# Patient Record
Sex: FEMALE | Race: WHITE | Employment: FULL TIME | URBAN - METROPOLITAN AREA
[De-identification: names, ages, dates, MRNs, and addresses within clinical notes are randomized per-mention and may not be internally consistent; named-entity substitution may affect disease eponyms.]

---

## 2017-02-08 PROBLEM — D21.6 BENIGN NEOPLASM OF SOFT TISSUE OF BACK: Status: ACTIVE | Noted: 2017-02-08

## 2017-03-10 PROBLEM — E78.5 HYPERLIPEMIA: Status: ACTIVE | Noted: 2017-03-10

## 2017-03-10 PROBLEM — I10 HTN (HYPERTENSION), BENIGN: Status: ACTIVE | Noted: 2017-03-10

## 2017-03-10 PROBLEM — E87.6 HYPOKALEMIA: Status: ACTIVE | Noted: 2017-03-10

## 2017-03-10 PROBLEM — R07.9 CHEST PAIN: Status: ACTIVE | Noted: 2017-03-10

## 2018-03-05 PROBLEM — M54.9 BACK PAIN: Status: ACTIVE | Noted: 2018-03-05

## 2018-03-05 PROBLEM — R07.9 CHEST PAIN: Status: RESOLVED | Noted: 2017-03-10 | Resolved: 2018-03-05

## 2018-03-05 PROBLEM — D21.6 BENIGN NEOPLASM OF SOFT TISSUE OF BACK: Status: RESOLVED | Noted: 2017-02-08 | Resolved: 2018-03-05

## 2018-05-28 ENCOUNTER — HOSPITAL ENCOUNTER (OUTPATIENT)
Age: 58
Discharge: HOME | End: 2018-05-28
Payer: COMMERCIAL

## 2018-05-28 DIAGNOSIS — E78.2 MIXED HYPERLIPIDEMIA: ICD-10-CM

## 2018-05-28 DIAGNOSIS — I10 ESSENTIAL HYPERTENSION: ICD-10-CM

## 2018-05-28 DIAGNOSIS — E87.6 HYPOKALEMIA: ICD-10-CM

## 2018-05-28 DIAGNOSIS — M54.42 LOW BACK PAIN WITH BILATERAL SCIATICA, UNSPECIFIED BACK PAIN LATERALITY, UNSPECIFIED CHRONICITY: ICD-10-CM

## 2018-05-28 DIAGNOSIS — M54.41 LOW BACK PAIN WITH BILATERAL SCIATICA, UNSPECIFIED BACK PAIN LATERALITY, UNSPECIFIED CHRONICITY: ICD-10-CM

## 2018-05-28 LAB
ALBUMIN SERPL-MCNC: 4.1 G/DL (ref 3.5–5.2)
ALP BLD-CCNC: 64 U/L (ref 35–104)
ALT SERPL-CCNC: 22 U/L (ref 0–32)
ANION GAP SERPL CALCULATED.3IONS-SCNC: 12 MMOL/L (ref 7–16)
AST SERPL-CCNC: 29 U/L (ref 0–31)
BILIRUB SERPL-MCNC: 1.3 MG/DL (ref 0–1.2)
BUN BLDV-MCNC: 15 MG/DL (ref 6–20)
CALCIUM SERPL-MCNC: 9.5 MG/DL (ref 8.6–10.2)
CHLORIDE BLD-SCNC: 101 MMOL/L (ref 98–107)
CHOLESTEROL, TOTAL: 159 MG/DL (ref 0–199)
CO2: 28 MMOL/L (ref 22–29)
CREAT SERPL-MCNC: 0.7 MG/DL (ref 0.5–1)
GFR AFRICAN AMERICAN: >60
GFR NON-AFRICAN AMERICAN: >60 ML/MIN/1.73
GLUCOSE BLD-MCNC: 102 MG/DL (ref 74–109)
HDLC SERPL-MCNC: 66 MG/DL
LDL CHOLESTEROL CALCULATED: 78 MG/DL (ref 0–99)
POTASSIUM SERPL-SCNC: 3.8 MMOL/L (ref 3.5–5)
SODIUM BLD-SCNC: 141 MMOL/L (ref 132–146)
TOTAL PROTEIN: 7.2 G/DL (ref 6.4–8.3)
TRIGL SERPL-MCNC: 73 MG/DL (ref 0–149)
VLDLC SERPL CALC-MCNC: 15 MG/DL

## 2018-05-28 PROCEDURE — 80061 LIPID PANEL: CPT

## 2018-05-28 PROCEDURE — 36415 COLL VENOUS BLD VENIPUNCTURE: CPT

## 2018-05-28 PROCEDURE — 80053 COMPREHEN METABOLIC PANEL: CPT

## 2018-06-01 RX ORDER — TRIAMTERENE AND HYDROCHLOROTHIAZIDE 37.5; 25 MG/1; MG/1
1 TABLET ORAL DAILY
Qty: 90 TABLET | Refills: 0 | Status: SHIPPED | OUTPATIENT
Start: 2018-06-01 | End: 2018-08-13 | Stop reason: SDUPTHER

## 2018-06-05 ENCOUNTER — OFFICE VISIT (OUTPATIENT)
Dept: PRIMARY CARE CLINIC | Age: 58
End: 2018-06-05
Payer: COMMERCIAL

## 2018-06-05 VITALS
SYSTOLIC BLOOD PRESSURE: 130 MMHG | WEIGHT: 219 LBS | TEMPERATURE: 97 F | HEART RATE: 76 BPM | BODY MASS INDEX: 33.79 KG/M2 | DIASTOLIC BLOOD PRESSURE: 80 MMHG

## 2018-06-05 DIAGNOSIS — E78.2 MIXED HYPERLIPIDEMIA: ICD-10-CM

## 2018-06-05 DIAGNOSIS — I10 HTN (HYPERTENSION), BENIGN: Primary | ICD-10-CM

## 2018-06-05 DIAGNOSIS — E87.6 HYPOKALEMIA: ICD-10-CM

## 2018-06-05 DIAGNOSIS — M54.41 LOW BACK PAIN WITH BILATERAL SCIATICA, UNSPECIFIED BACK PAIN LATERALITY, UNSPECIFIED CHRONICITY: ICD-10-CM

## 2018-06-05 DIAGNOSIS — G47.00 INSOMNIA, UNSPECIFIED TYPE: ICD-10-CM

## 2018-06-05 DIAGNOSIS — M54.42 LOW BACK PAIN WITH BILATERAL SCIATICA, UNSPECIFIED BACK PAIN LATERALITY, UNSPECIFIED CHRONICITY: ICD-10-CM

## 2018-06-05 PROCEDURE — 99214 OFFICE O/P EST MOD 30 MIN: CPT | Performed by: FAMILY MEDICINE

## 2018-06-05 RX ORDER — METHYLPREDNISOLONE 4 MG/1
TABLET ORAL
Qty: 1 KIT | Refills: 0 | Status: SHIPPED | OUTPATIENT
Start: 2018-06-05 | End: 2018-06-11

## 2018-06-05 ASSESSMENT — ENCOUNTER SYMPTOMS
BACK PAIN: 1
NAUSEA: 0
HEMOPTYSIS: 0
BLURRED VISION: 0
SHORTNESS OF BREATH: 0
BLOOD IN STOOL: 0
ORTHOPNEA: 0
EYE DISCHARGE: 0
ABDOMINAL PAIN: 0

## 2018-06-12 ENCOUNTER — OFFICE VISIT (OUTPATIENT)
Dept: PRIMARY CARE CLINIC | Age: 58
End: 2018-06-12
Payer: COMMERCIAL

## 2018-06-12 VITALS
BODY MASS INDEX: 33.49 KG/M2 | DIASTOLIC BLOOD PRESSURE: 70 MMHG | HEART RATE: 76 BPM | TEMPERATURE: 96.6 F | SYSTOLIC BLOOD PRESSURE: 110 MMHG | WEIGHT: 217 LBS

## 2018-06-12 DIAGNOSIS — E78.2 MIXED HYPERLIPIDEMIA: ICD-10-CM

## 2018-06-12 DIAGNOSIS — I10 HTN (HYPERTENSION), BENIGN: ICD-10-CM

## 2018-06-12 DIAGNOSIS — M54.41 LOW BACK PAIN WITH BILATERAL SCIATICA, UNSPECIFIED BACK PAIN LATERALITY, UNSPECIFIED CHRONICITY: Primary | ICD-10-CM

## 2018-06-12 DIAGNOSIS — M54.42 LOW BACK PAIN WITH BILATERAL SCIATICA, UNSPECIFIED BACK PAIN LATERALITY, UNSPECIFIED CHRONICITY: Primary | ICD-10-CM

## 2018-06-12 DIAGNOSIS — E87.6 HYPOKALEMIA: ICD-10-CM

## 2018-06-12 PROCEDURE — 99213 OFFICE O/P EST LOW 20 MIN: CPT | Performed by: FAMILY MEDICINE

## 2018-06-12 ASSESSMENT — ENCOUNTER SYMPTOMS: BACK PAIN: 1

## 2018-08-04 ENCOUNTER — HOSPITAL ENCOUNTER (OUTPATIENT)
Age: 58
Discharge: HOME | End: 2018-08-06
Payer: COMMERCIAL

## 2018-08-04 DIAGNOSIS — E87.6 HYPOKALEMIA: ICD-10-CM

## 2018-08-04 DIAGNOSIS — E78.2 MIXED HYPERLIPIDEMIA: ICD-10-CM

## 2018-08-04 DIAGNOSIS — I10 HTN (HYPERTENSION), BENIGN: ICD-10-CM

## 2018-08-04 LAB
ALBUMIN SERPL-MCNC: 4.1 G/DL (ref 3.5–5.2)
ALP BLD-CCNC: 53 U/L (ref 35–104)
ALT SERPL-CCNC: 19 U/L (ref 0–32)
ANION GAP SERPL CALCULATED.3IONS-SCNC: 9 MMOL/L (ref 7–16)
AST SERPL-CCNC: 24 U/L (ref 0–31)
BILIRUB SERPL-MCNC: 1.4 MG/DL (ref 0–1.2)
BUN BLDV-MCNC: 17 MG/DL (ref 6–20)
CALCIUM SERPL-MCNC: 9.2 MG/DL (ref 8.6–10.2)
CHLORIDE BLD-SCNC: 105 MMOL/L (ref 98–107)
CHOLESTEROL, TOTAL: 148 MG/DL (ref 0–199)
CO2: 26 MMOL/L (ref 22–29)
CREAT SERPL-MCNC: 0.7 MG/DL (ref 0.5–1)
GFR AFRICAN AMERICAN: >60
GFR NON-AFRICAN AMERICAN: >60 ML/MIN/1.73
GLUCOSE BLD-MCNC: 85 MG/DL (ref 74–109)
HDLC SERPL-MCNC: 50 MG/DL
LDL CHOLESTEROL CALCULATED: 81 MG/DL (ref 0–99)
POTASSIUM SERPL-SCNC: 3.6 MMOL/L (ref 3.5–5)
SODIUM BLD-SCNC: 140 MMOL/L (ref 132–146)
TOTAL PROTEIN: 6.7 G/DL (ref 6.4–8.3)
TRIGL SERPL-MCNC: 83 MG/DL (ref 0–149)
VLDLC SERPL CALC-MCNC: 17 MG/DL

## 2018-08-04 PROCEDURE — 85027 COMPLETE CBC AUTOMATED: CPT

## 2018-08-04 PROCEDURE — 36415 COLL VENOUS BLD VENIPUNCTURE: CPT

## 2018-08-04 PROCEDURE — 80053 COMPREHEN METABOLIC PANEL: CPT

## 2018-08-04 PROCEDURE — 80061 LIPID PANEL: CPT

## 2018-08-13 ENCOUNTER — OFFICE VISIT (OUTPATIENT)
Dept: PRIMARY CARE CLINIC | Age: 58
End: 2018-08-13
Payer: COMMERCIAL

## 2018-08-13 DIAGNOSIS — E87.6 HYPOKALEMIA: ICD-10-CM

## 2018-08-13 DIAGNOSIS — I10 HTN (HYPERTENSION), BENIGN: ICD-10-CM

## 2018-08-13 DIAGNOSIS — E78.2 MIXED HYPERLIPIDEMIA: Primary | ICD-10-CM

## 2018-08-13 DIAGNOSIS — M54.42 LOW BACK PAIN WITH BILATERAL SCIATICA, UNSPECIFIED BACK PAIN LATERALITY, UNSPECIFIED CHRONICITY: ICD-10-CM

## 2018-08-13 DIAGNOSIS — M54.41 LOW BACK PAIN WITH BILATERAL SCIATICA, UNSPECIFIED BACK PAIN LATERALITY, UNSPECIFIED CHRONICITY: ICD-10-CM

## 2018-08-13 PROCEDURE — 99213 OFFICE O/P EST LOW 20 MIN: CPT | Performed by: FAMILY MEDICINE

## 2018-08-13 RX ORDER — TRIAMTERENE AND HYDROCHLOROTHIAZIDE 37.5; 25 MG/1; MG/1
1 TABLET ORAL DAILY
Qty: 90 TABLET | Refills: 1 | Status: SHIPPED | OUTPATIENT
Start: 2018-08-13 | End: 2018-11-15 | Stop reason: SDUPTHER

## 2018-08-13 RX ORDER — SIMVASTATIN 10 MG
10 TABLET ORAL DAILY
Qty: 90 TABLET | Refills: 1 | Status: SHIPPED | OUTPATIENT
Start: 2018-08-13 | End: 2018-11-15 | Stop reason: SDUPTHER

## 2018-08-13 ASSESSMENT — ENCOUNTER SYMPTOMS
BLOOD IN STOOL: 0
ABDOMINAL PAIN: 0
BACK PAIN: 1
HEMOPTYSIS: 0
ORTHOPNEA: 0
NAUSEA: 0
EYE DISCHARGE: 0
SHORTNESS OF BREATH: 0
BLURRED VISION: 0

## 2018-08-13 NOTE — PATIENT INSTRUCTIONS
Continue present medication  Follow up with the back surgeon  Low-sodium low-fat diet  Regular exercises  Return to clinic earlier if any problems

## 2018-08-13 NOTE — PROGRESS NOTES
OFFICE PROGRESS NOTE      SUBJECTIVE:        Patient ID:   Marc Junior is a 62 y.o. female who presents for   Chief Complaint   Patient presents with    Hypertension     Herer for recheck on Hypertension and Hyperlipidemia. Patient report feeling well without any complaints. Lab work done,here for review. HPI:   Patient feeling better  Lab work is within normal limits  Complaining of left hip pain in the back pain  Has been going to chiropractor  Has been followed up by the back surgeon        Prior to Admission medications    Medication Sig Start Date End Date Taking?  Authorizing Provider   triamterene-hydrochlorothiazide (MAXZIDE-25) 37.5-25 MG per tablet Take 1 tablet by mouth daily 8/13/18  Yes Madonna Cervantes MD   simvastatin (ZOCOR) 10 MG tablet Take 1 tablet by mouth daily 8/13/18  Yes Madonna Cervantes MD   potassium chloride (KLOR-CON) 10 MEQ extended release tablet Take 1 tablet by mouth 2 times daily 3/5/18  Yes Madonna Cervantes MD   Coenzyme Q10 (COQ10) 200 MG CAPS Take by mouth daily   Yes Historical Provider, MD   Pyridoxine HCl (VITAMIN B-6 PO) Take by mouth daily   Yes Historical Provider, MD     Social History     Social History    Marital status:      Spouse name: N/A    Number of children: N/A    Years of education: N/A     Social History Main Topics    Smoking status: Former Smoker     Packs/day: 0.30     Years: 15.00     Types: Cigarettes     Quit date: 2/7/1989    Smokeless tobacco: Never Used    Alcohol use Yes      Comment: occasional beer or wine    Drug use: No    Sexual activity: Yes     Partners: Male     Other Topics Concern    Not on file     Social History Narrative    No narrative on file       I have reviewed Dayanna's allergies, medications, problem list, medical, social and family history and have updated as needed in the electronic medical record  Review Of Systems:    Review of Systems   Constitutional: Negative 03/03/2018    HGB 13.9 03/03/2018    HCT 38.9 03/03/2018     03/03/2018    CHOL 148 08/04/2018    TRIG 83 08/04/2018    HDL 50 08/04/2018    ALT 19 08/04/2018    AST 24 08/04/2018     08/04/2018    K 3.6 08/04/2018     08/04/2018    CREATININE 0.7 08/04/2018    BUN 17 08/04/2018    CO2 26 08/04/2018    TSH 1.010 03/11/2017    INR 0.9 03/10/2017    GLUF 96 03/03/2018     No results found for: VOL, APPEARANCE, COLORU, LABSPEC, LABPH, LEUKBLD, NITRU, GLUCOSEU, KETUA, UROBILINOGEN, KETUA, UROBILINOGEN, BILIRUBINUR, OCBU  No results found for: PSA, CEA, , UN6768,       Controlled Substances Monitoring:                                    ASSESSMENT     Patient Active Problem List    Diagnosis Date Noted    Back pain 03/05/2018    HTN (hypertension), benign 03/10/2017    Hyperlipemia 03/10/2017    Hypokalemia 03/10/2017        Diagnosis:     ICD-10-CM ICD-9-CM    1. Mixed hyperlipidemia E78.2 272.2 CBC      Comprehensive Metabolic Panel      Lipid Panel   2. HTN (hypertension), benign I10 401.1 CBC      Comprehensive Metabolic Panel   3. Hypokalemia E87.6 276.8 Comprehensive Metabolic Panel   4. Low back pain with bilateral sciatica, unspecified back pain laterality, unspecified chronicity M54.42 724.3     M54.41         PLAN:      continue present treatment  Follow-up the consultant  Return to clinic earlier if any problems  Follow low-sodium low-fat diet  Regular exercises      Patient Instructions   Continue present medication  Follow up with the back surgeon  Low-sodium low-fat diet  Regular exercises  Return to clinic earlier if any problems      Return in about 3 months (around 11/13/2018). I have reviewed my findings and recommendations with Sheron Johnson.     Electronically signed by Omelia Riedel, MD on 8/13/18 at 4:52 PM

## 2018-10-22 ENCOUNTER — TELEPHONE (OUTPATIENT)
Dept: PRIMARY CARE CLINIC | Age: 58
End: 2018-10-22

## 2018-10-23 RX ORDER — POTASSIUM CHLORIDE 750 MG/1
10 TABLET, FILM COATED, EXTENDED RELEASE ORAL 2 TIMES DAILY
Qty: 60 TABLET | Refills: 0 | Status: SHIPPED | OUTPATIENT
Start: 2018-10-23 | End: 2018-10-23

## 2018-10-23 RX ORDER — POTASSIUM CHLORIDE 750 MG/1
10 TABLET, FILM COATED, EXTENDED RELEASE ORAL 2 TIMES DAILY
Qty: 180 TABLET | Refills: 1 | Status: SHIPPED | OUTPATIENT
Start: 2018-10-23 | End: 2018-11-15 | Stop reason: SDUPTHER

## 2018-11-10 ENCOUNTER — HOSPITAL ENCOUNTER (OUTPATIENT)
Age: 58
Discharge: HOME | End: 2018-11-10
Payer: COMMERCIAL

## 2018-11-10 DIAGNOSIS — E78.2 MIXED HYPERLIPIDEMIA: ICD-10-CM

## 2018-11-10 DIAGNOSIS — I10 HTN (HYPERTENSION), BENIGN: ICD-10-CM

## 2018-11-10 DIAGNOSIS — E87.6 HYPOKALEMIA: ICD-10-CM

## 2018-11-10 LAB
ALBUMIN SERPL-MCNC: 3.9 G/DL (ref 3.5–5.2)
ALP BLD-CCNC: 59 U/L (ref 35–104)
ALT SERPL-CCNC: 18 U/L (ref 0–32)
ANION GAP SERPL CALCULATED.3IONS-SCNC: 11 MMOL/L (ref 7–16)
AST SERPL-CCNC: 23 U/L (ref 0–31)
BILIRUB SERPL-MCNC: 1.4 MG/DL (ref 0–1.2)
BUN BLDV-MCNC: 17 MG/DL (ref 6–20)
CALCIUM SERPL-MCNC: 9.2 MG/DL (ref 8.6–10.2)
CHLORIDE BLD-SCNC: 102 MMOL/L (ref 98–107)
CHOLESTEROL, TOTAL: 169 MG/DL (ref 0–199)
CO2: 25 MMOL/L (ref 22–29)
CREAT SERPL-MCNC: 0.7 MG/DL (ref 0.5–1)
GFR AFRICAN AMERICAN: >60
GFR NON-AFRICAN AMERICAN: >60 ML/MIN/1.73
GLUCOSE FASTING: 90 MG/DL (ref 74–99)
HCT VFR BLD CALC: 40.5 % (ref 34–48)
HDLC SERPL-MCNC: 57 MG/DL
HEMOGLOBIN: 14.3 G/DL (ref 11.5–15.5)
LDL CHOLESTEROL CALCULATED: 97 MG/DL (ref 0–99)
MCH RBC QN AUTO: 29.9 PG (ref 26–35)
MCHC RBC AUTO-ENTMCNC: 35.3 % (ref 32–34.5)
MCV RBC AUTO: 84.6 FL (ref 80–99.9)
PDW BLD-RTO: 12.2 FL (ref 11.5–15)
PLATELET # BLD: 155 E9/L (ref 130–450)
PMV BLD AUTO: 12 FL (ref 7–12)
POTASSIUM SERPL-SCNC: 4 MMOL/L (ref 3.5–5)
RBC # BLD: 4.79 E12/L (ref 3.5–5.5)
SODIUM BLD-SCNC: 138 MMOL/L (ref 132–146)
TOTAL PROTEIN: 6.5 G/DL (ref 6.4–8.3)
TRIGL SERPL-MCNC: 75 MG/DL (ref 0–149)
VLDLC SERPL CALC-MCNC: 15 MG/DL
WBC # BLD: 5.2 E9/L (ref 4.5–11.5)

## 2018-11-10 PROCEDURE — 80053 COMPREHEN METABOLIC PANEL: CPT

## 2018-11-10 PROCEDURE — 36415 COLL VENOUS BLD VENIPUNCTURE: CPT

## 2018-11-10 PROCEDURE — 85027 COMPLETE CBC AUTOMATED: CPT

## 2018-11-10 PROCEDURE — 80061 LIPID PANEL: CPT

## 2018-11-15 ENCOUNTER — OFFICE VISIT (OUTPATIENT)
Dept: PRIMARY CARE CLINIC | Age: 58
End: 2018-11-15
Payer: COMMERCIAL

## 2018-11-15 VITALS
SYSTOLIC BLOOD PRESSURE: 110 MMHG | DIASTOLIC BLOOD PRESSURE: 80 MMHG | TEMPERATURE: 96.8 F | WEIGHT: 225 LBS | BODY MASS INDEX: 34.72 KG/M2 | HEART RATE: 80 BPM

## 2018-11-15 DIAGNOSIS — G47.00 INSOMNIA, UNSPECIFIED TYPE: ICD-10-CM

## 2018-11-15 DIAGNOSIS — E87.6 HYPOKALEMIA: ICD-10-CM

## 2018-11-15 DIAGNOSIS — I10 HTN (HYPERTENSION), BENIGN: Primary | ICD-10-CM

## 2018-11-15 DIAGNOSIS — Z12.31 ENCOUNTER FOR SCREENING MAMMOGRAM FOR BREAST CANCER: ICD-10-CM

## 2018-11-15 DIAGNOSIS — E78.2 MIXED HYPERLIPIDEMIA: ICD-10-CM

## 2018-11-15 DIAGNOSIS — Z23 NEED FOR PROPHYLACTIC VACCINATION AGAINST DIPHTHERIA-TETANUS-PERTUSSIS (DTP): ICD-10-CM

## 2018-11-15 DIAGNOSIS — M54.41 LOW BACK PAIN WITH BILATERAL SCIATICA, UNSPECIFIED BACK PAIN LATERALITY, UNSPECIFIED CHRONICITY: ICD-10-CM

## 2018-11-15 DIAGNOSIS — M54.42 LOW BACK PAIN WITH BILATERAL SCIATICA, UNSPECIFIED BACK PAIN LATERALITY, UNSPECIFIED CHRONICITY: ICD-10-CM

## 2018-11-15 DIAGNOSIS — M19.90 ARTHRITIS: ICD-10-CM

## 2018-11-15 PROCEDURE — 99214 OFFICE O/P EST MOD 30 MIN: CPT | Performed by: FAMILY MEDICINE

## 2018-11-15 RX ORDER — TRIAMTERENE AND HYDROCHLOROTHIAZIDE 37.5; 25 MG/1; MG/1
1 TABLET ORAL DAILY
Qty: 90 TABLET | Refills: 1 | Status: SHIPPED | OUTPATIENT
Start: 2018-11-15 | End: 2019-04-29 | Stop reason: SDUPTHER

## 2018-11-15 RX ORDER — SIMVASTATIN 10 MG
10 TABLET ORAL DAILY
Qty: 90 TABLET | Refills: 1 | Status: SHIPPED | OUTPATIENT
Start: 2018-11-15 | End: 2019-04-29 | Stop reason: SDUPTHER

## 2018-11-15 RX ORDER — POTASSIUM CHLORIDE 750 MG/1
10 TABLET, FILM COATED, EXTENDED RELEASE ORAL 2 TIMES DAILY
Qty: 180 TABLET | Refills: 1 | Status: SHIPPED | OUTPATIENT
Start: 2018-11-15 | End: 2019-04-29 | Stop reason: SDUPTHER

## 2018-11-15 ASSESSMENT — ENCOUNTER SYMPTOMS
EYES NEGATIVE: 1
BACK PAIN: 1
ALLERGIC/IMMUNOLOGIC NEGATIVE: 1
GASTROINTESTINAL NEGATIVE: 1
RESPIRATORY NEGATIVE: 1

## 2018-11-19 DIAGNOSIS — Z12.39 SCREENING FOR BREAST CANCER: Primary | ICD-10-CM

## 2018-11-26 ENCOUNTER — HOSPITAL ENCOUNTER (EMERGENCY)
Age: 58
Discharge: HOME | End: 2018-11-26
Payer: COMMERCIAL

## 2018-11-26 VITALS
RESPIRATION RATE: 16 BRPM | SYSTOLIC BLOOD PRESSURE: 133 MMHG | HEART RATE: 67 BPM | OXYGEN SATURATION: 99 % | TEMPERATURE: 98.7 F | WEIGHT: 225 LBS | BODY MASS INDEX: 34.72 KG/M2 | DIASTOLIC BLOOD PRESSURE: 81 MMHG

## 2018-11-26 DIAGNOSIS — L02.91 ABSCESS: Primary | ICD-10-CM

## 2018-11-26 PROCEDURE — 96372 THER/PROPH/DIAG INJ SC/IM: CPT

## 2018-11-26 PROCEDURE — 2500000003 HC RX 250 WO HCPCS: Performed by: PHYSICIAN ASSISTANT

## 2018-11-26 PROCEDURE — 10060 I&D ABSCESS SIMPLE/SINGLE: CPT

## 2018-11-26 PROCEDURE — 99213 OFFICE O/P EST LOW 20 MIN: CPT

## 2018-11-26 RX ORDER — DOXYCYCLINE HYCLATE 100 MG
100 TABLET ORAL 2 TIMES DAILY
Qty: 20 TABLET | Refills: 0 | Status: SHIPPED | OUTPATIENT
Start: 2018-11-26 | End: 2018-12-06

## 2018-11-26 RX ORDER — LIDOCAINE HYDROCHLORIDE 10 MG/ML
20 INJECTION, SOLUTION INFILTRATION; PERINEURAL ONCE
Status: COMPLETED | OUTPATIENT
Start: 2018-11-26 | End: 2018-11-26

## 2018-11-26 RX ADMIN — LIDOCAINE HYDROCHLORIDE 20 ML: 10 INJECTION, SOLUTION INFILTRATION; PERINEURAL at 16:45

## 2018-11-26 ASSESSMENT — PAIN DESCRIPTION - LOCATION: LOCATION: BACK

## 2018-11-26 ASSESSMENT — PAIN DESCRIPTION - DESCRIPTORS: DESCRIPTORS: SORE

## 2018-11-26 ASSESSMENT — PAIN SCALES - GENERAL: PAINLEVEL_OUTOF10: 7

## 2018-11-26 ASSESSMENT — PAIN DESCRIPTION - ORIENTATION: ORIENTATION: RIGHT;MID

## 2018-11-26 NOTE — ED PROVIDER NOTES
Pulmonary: Lungs clear bilaterally with good aeration. No adventitious breath sounds. Abdomen: Soft, nonsurgical. Nontender. No peritoneal signs. Normoactive bowel sounds. Extremities: No peripheral edema. Negative Homans bilaterally, no cords. Neurovascularly intact throughout. Skin: Exam of the skin shows an approximate 2 cm² fluctuant mass on the right posterior thorax. There is mild overlying erythema. Neuro: No gross neurologic deficits. Lab / Imaging Results   (All laboratory and radiology results have been personally reviewed by myself)  Labs:  No results found for this visit on 11/26/18. Imaging: All Radiology results interpreted by Radiologist unless otherwise noted. No orders to display       ED Course / Medical Decision Making     Medications   lidocaine 1 % injection 20 mL (20 mLs Intradermal Given 11/26/18 1645)          Consult(s):   None    Procedure(s):     PROCEDURE  11/26/18       Time: 1700    INCISION AND DRAINAGE  Risks, benefits and alternatives (for applicable procedures below) described. Performed By: SALVADOR Hwang. Indication: Abscess  Informed consent obtained: The patient was counseled regarding the procedure, it's indications, risks, potential complications and alternatives and any questions were answered. Consent was obtained. .  Prep: The skin was cleansed with povidone iodine and draped in a sterile fashion. Anesthetic: The wound area was anesthetized with Lidocaine 1% without epinephrine. Incision: Soft tissue abscess of the posterior thorax was Incised by scalpal and moderate fluid was drained. Appeared to be casseous fluid indicative of a sebaceous cyst. A wound culture was not obtained. The wound  was not irrigated and was not packed with iodoform gauze. The wound was then covered with a sterile dressing. Patient tolerated the procedure well.              Differential Diagnosis: Is extensive but includes sebaceous cyst, cellulitis, lymphangitis,

## 2018-11-29 ENCOUNTER — OFFICE VISIT (OUTPATIENT)
Dept: PRIMARY CARE CLINIC | Age: 58
End: 2018-11-29
Payer: COMMERCIAL

## 2018-11-29 ENCOUNTER — HOSPITAL ENCOUNTER (OUTPATIENT)
Age: 58
Discharge: HOME | End: 2018-12-01
Payer: COMMERCIAL

## 2018-11-29 VITALS
OXYGEN SATURATION: 98 % | HEART RATE: 79 BPM | BODY MASS INDEX: 34.57 KG/M2 | WEIGHT: 224 LBS | DIASTOLIC BLOOD PRESSURE: 74 MMHG | SYSTOLIC BLOOD PRESSURE: 126 MMHG | TEMPERATURE: 98.3 F

## 2018-11-29 DIAGNOSIS — L02.91 ABSCESS: Primary | ICD-10-CM

## 2018-11-29 PROCEDURE — 87205 SMEAR GRAM STAIN: CPT

## 2018-11-29 PROCEDURE — 99213 OFFICE O/P EST LOW 20 MIN: CPT | Performed by: FAMILY MEDICINE

## 2018-11-29 PROCEDURE — 87070 CULTURE OTHR SPECIMN AEROBIC: CPT

## 2018-11-29 ASSESSMENT — ENCOUNTER SYMPTOMS
GASTROINTESTINAL NEGATIVE: 1
ALLERGIC/IMMUNOLOGIC NEGATIVE: 1
EYES NEGATIVE: 1
RESPIRATORY NEGATIVE: 1

## 2018-12-02 LAB
GRAM STAIN RESULT: NORMAL
WOUND/ABSCESS: NORMAL

## 2018-12-03 ENCOUNTER — HOSPITAL ENCOUNTER (OUTPATIENT)
Age: 58
Discharge: HOME | End: 2018-12-03
Payer: COMMERCIAL

## 2018-12-03 DIAGNOSIS — L02.91 ABSCESS: ICD-10-CM

## 2018-12-03 LAB
BASOPHILS ABSOLUTE: 0.06 E9/L (ref 0–0.2)
BASOPHILS RELATIVE PERCENT: 1 % (ref 0–2)
EOSINOPHILS ABSOLUTE: 0.21 E9/L (ref 0.05–0.5)
EOSINOPHILS RELATIVE PERCENT: 3.4 % (ref 0–6)
HCT VFR BLD CALC: 40.4 % (ref 34–48)
HEMOGLOBIN: 14.3 G/DL (ref 11.5–15.5)
IMMATURE GRANULOCYTES #: 0.02 E9/L
IMMATURE GRANULOCYTES %: 0.3 % (ref 0–5)
LYMPHOCYTES ABSOLUTE: 2.22 E9/L (ref 1.5–4)
LYMPHOCYTES RELATIVE PERCENT: 35.5 % (ref 20–42)
MCH RBC QN AUTO: 30 PG (ref 26–35)
MCHC RBC AUTO-ENTMCNC: 35.4 % (ref 32–34.5)
MCV RBC AUTO: 84.9 FL (ref 80–99.9)
MONOCYTES ABSOLUTE: 0.45 E9/L (ref 0.1–0.95)
MONOCYTES RELATIVE PERCENT: 7.2 % (ref 2–12)
NEUTROPHILS ABSOLUTE: 3.29 E9/L (ref 1.8–7.3)
NEUTROPHILS RELATIVE PERCENT: 52.6 % (ref 43–80)
PDW BLD-RTO: 11.9 FL (ref 11.5–15)
PLATELET # BLD: 159 E9/L (ref 130–450)
PMV BLD AUTO: 11.9 FL (ref 7–12)
RBC # BLD: 4.76 E12/L (ref 3.5–5.5)
WBC # BLD: 6.3 E9/L (ref 4.5–11.5)

## 2018-12-03 PROCEDURE — 36415 COLL VENOUS BLD VENIPUNCTURE: CPT

## 2018-12-03 PROCEDURE — 85025 COMPLETE CBC W/AUTO DIFF WBC: CPT

## 2018-12-07 ENCOUNTER — OFFICE VISIT (OUTPATIENT)
Dept: PRIMARY CARE CLINIC | Age: 58
End: 2018-12-07
Payer: COMMERCIAL

## 2018-12-07 VITALS
HEART RATE: 72 BPM | OXYGEN SATURATION: 98 % | SYSTOLIC BLOOD PRESSURE: 134 MMHG | WEIGHT: 221 LBS | TEMPERATURE: 98.1 F | BODY MASS INDEX: 33.49 KG/M2 | HEIGHT: 68 IN | DIASTOLIC BLOOD PRESSURE: 76 MMHG

## 2018-12-07 DIAGNOSIS — L02.91 ABSCESS: ICD-10-CM

## 2018-12-07 DIAGNOSIS — I10 HTN (HYPERTENSION), BENIGN: Primary | ICD-10-CM

## 2018-12-07 DIAGNOSIS — E78.2 MIXED HYPERLIPIDEMIA: ICD-10-CM

## 2018-12-07 PROCEDURE — 99213 OFFICE O/P EST LOW 20 MIN: CPT | Performed by: FAMILY MEDICINE

## 2018-12-07 ASSESSMENT — ENCOUNTER SYMPTOMS
ALLERGIC/IMMUNOLOGIC NEGATIVE: 1
GASTROINTESTINAL NEGATIVE: 1
EYES NEGATIVE: 1
RESPIRATORY NEGATIVE: 1

## 2018-12-07 ASSESSMENT — PATIENT HEALTH QUESTIONNAIRE - PHQ9
1. LITTLE INTEREST OR PLEASURE IN DOING THINGS: 0
SUM OF ALL RESPONSES TO PHQ QUESTIONS 1-9: 0
SUM OF ALL RESPONSES TO PHQ9 QUESTIONS 1 & 2: 0
SUM OF ALL RESPONSES TO PHQ QUESTIONS 1-9: 0
2. FEELING DOWN, DEPRESSED OR HOPELESS: 0

## 2018-12-07 NOTE — PATIENT INSTRUCTIONS
Continue present treatment  Keep the wound dry and clean  Return to clinic earlier if any problems  Continue low-sodium low-fat diet  Repeat the lab work  Return to clinic at his regular appointment

## 2018-12-07 NOTE — PROGRESS NOTES
OFFICE PROGRESS NOTE      SUBJECTIVE:        Patient ID:   Chetan Alicea is a 62 y.o. female who presents for   Chief Complaint   Patient presents with    Other     cyst on back/ improved    Hypertension    Hyperlipidemia           HPI:     Patient is feeling better  The cyst has been healing up  There is no one discharge  The culture was negative      Prior to Admission medications    Medication Sig Start Date End Date Taking? Authorizing Provider   potassium chloride (KLOR-CON) 10 MEQ extended release tablet Take 1 tablet by mouth 2 times daily 11/15/18  Yes Shazia Del Real MD   simvastatin (ZOCOR) 10 MG tablet Take 1 tablet by mouth daily 11/15/18  Yes Shazia Del Real MD   triamterene-hydrochlorothiazide [facility]) 37.5-25 MG per tablet Take 1 tablet by mouth daily 11/15/18  Yes Shazia Del Real MD   Coenzyme Q10 (COQ10) 200 MG CAPS Take by mouth daily   Yes Historical Provider, MD   Pyridoxine HCl (VITAMIN B-6 PO) Take by mouth daily   Yes Historical Provider, MD     Social History     Social History    Marital status:      Spouse name: N/A    Number of children: N/A    Years of education: N/A     Social History Main Topics    Smoking status: Former Smoker     Packs/day: 0.30     Years: 15.00     Types: Cigarettes     Quit date: 2/7/1989    Smokeless tobacco: Never Used    Alcohol use Yes      Comment: occasional beer or wine    Drug use: No    Sexual activity: Yes     Partners: Male     Other Topics Concern    None     Social History Narrative    None       I have reviewed Dayanna's allergies, medications, problem list, medical, social and family history and have updated as needed in the electronic medical record  Review Of Systems:    Review of Systems   Constitutional: Negative. HENT: Negative. Eyes: Negative. Respiratory: Negative. Cardiovascular: Negative. Gastrointestinal: Negative. Endocrine: Negative.     Musculoskeletal:

## 2018-12-11 ENCOUNTER — HOSPITAL ENCOUNTER (OUTPATIENT)
Dept: MAMMOGRAPHY | Age: 58
Discharge: HOME | End: 2018-12-13
Payer: COMMERCIAL

## 2018-12-11 DIAGNOSIS — Z12.39 SCREENING FOR BREAST CANCER: ICD-10-CM

## 2018-12-11 PROCEDURE — 77067 SCR MAMMO BI INCL CAD: CPT

## 2018-12-20 ENCOUNTER — OFFICE VISIT (OUTPATIENT)
Dept: PRIMARY CARE CLINIC | Age: 58
End: 2018-12-20
Payer: COMMERCIAL

## 2018-12-20 VITALS
BODY MASS INDEX: 34.36 KG/M2 | DIASTOLIC BLOOD PRESSURE: 80 MMHG | SYSTOLIC BLOOD PRESSURE: 112 MMHG | WEIGHT: 226 LBS | HEART RATE: 76 BPM | TEMPERATURE: 96 F

## 2018-12-20 DIAGNOSIS — E78.2 MIXED HYPERLIPIDEMIA: Primary | ICD-10-CM

## 2018-12-20 DIAGNOSIS — E87.6 HYPOKALEMIA: ICD-10-CM

## 2018-12-20 DIAGNOSIS — I10 HTN (HYPERTENSION), BENIGN: ICD-10-CM

## 2018-12-20 DIAGNOSIS — L02.91 ABSCESS: ICD-10-CM

## 2018-12-20 PROCEDURE — 99213 OFFICE O/P EST LOW 20 MIN: CPT | Performed by: FAMILY MEDICINE

## 2018-12-20 ASSESSMENT — ENCOUNTER SYMPTOMS
ALLERGIC/IMMUNOLOGIC NEGATIVE: 1
RESPIRATORY NEGATIVE: 1
GASTROINTESTINAL NEGATIVE: 1
EYES NEGATIVE: 1

## 2018-12-20 NOTE — PATIENT INSTRUCTIONS
Continue present treatment  Return to clinic at his regular appointment  Continue low-sodium low-fat diet  Low with a gynecologist  It in to clinic earlier if any problems

## 2018-12-20 NOTE — PROGRESS NOTES
Negative. Musculoskeletal: Negative. Skin: Negative. Allergic/Immunologic: Negative. Neurological: Negative. Hematological: Negative. Psychiatric/Behavioral: Negative. OBJECTIVE:     VS:  Wt Readings from Last 3 Encounters:   12/20/18 226 lb (102.5 kg)   12/07/18 221 lb (100.2 kg)   11/29/18 224 lb (101.6 kg)     Temp Readings from Last 3 Encounters:   12/20/18 96 °F (35.6 °C) (Temporal)   12/07/18 98.1 °F (36.7 °C) (Oral)   11/29/18 98.3 °F (36.8 °C)     BP Readings from Last 3 Encounters:   12/20/18 112/80   12/07/18 134/76   11/29/18 126/74        Physical Exam   Constitutional: She is oriented to person, place, and time. She appears well-developed and well-nourished. HENT:   Head: Normocephalic and atraumatic. Mouth/Throat: Oropharynx is clear and moist.   Eyes: Pupils are equal, round, and reactive to light. Conjunctivae are normal.   Neck: Normal range of motion. Neck supple. Cardiovascular: Normal rate, regular rhythm, normal heart sounds and intact distal pulses. Pulmonary/Chest: Effort normal and breath sounds normal.   Abdominal: Soft. Bowel sounds are normal.   Musculoskeletal: Normal range of motion. Neurological: She is alert and oriented to person, place, and time. Skin: Skin is warm and dry.    Psychiatric: Thought content normal.          Labs :    Lab Results   Component Value Date    WBC 6.3 12/03/2018    HGB 14.3 12/03/2018    HCT 40.4 12/03/2018     12/03/2018    CHOL 169 11/10/2018    TRIG 75 11/10/2018    HDL 57 11/10/2018    ALT 18 11/10/2018    AST 23 11/10/2018     11/10/2018    K 4.0 11/10/2018     11/10/2018    CREATININE 0.7 11/10/2018    BUN 17 11/10/2018    CO2 25 11/10/2018    TSH 1.010 03/11/2017    INR 0.9 03/10/2017    GLUF 90 11/10/2018     No results found for: VOL, APPEARANCE, COLORU, LABSPEC, LABPH, LEUKBLD, NITRU, GLUCOSEU, KETUA, UROBILINOGEN, KETUA, UROBILINOGEN, BILIRUBINUR, OCBU  No results found for: PSA, CEA,

## 2019-03-17 ENCOUNTER — HOSPITAL ENCOUNTER (OUTPATIENT)
Age: 59
Discharge: HOME | End: 2019-03-17
Payer: COMMERCIAL

## 2019-03-17 DIAGNOSIS — E78.2 MIXED HYPERLIPIDEMIA: ICD-10-CM

## 2019-03-17 DIAGNOSIS — L02.91 ABSCESS: ICD-10-CM

## 2019-03-17 DIAGNOSIS — I10 HTN (HYPERTENSION), BENIGN: ICD-10-CM

## 2019-03-17 LAB
ALBUMIN SERPL-MCNC: 3.9 G/DL (ref 3.5–5.2)
ALP BLD-CCNC: 65 U/L (ref 35–104)
ALT SERPL-CCNC: 21 U/L (ref 0–32)
ANION GAP SERPL CALCULATED.3IONS-SCNC: 11 MMOL/L (ref 7–16)
AST SERPL-CCNC: 26 U/L (ref 0–31)
BILIRUB SERPL-MCNC: 1 MG/DL (ref 0–1.2)
BUN BLDV-MCNC: 13 MG/DL (ref 6–20)
CALCIUM SERPL-MCNC: 9.5 MG/DL (ref 8.6–10.2)
CHLORIDE BLD-SCNC: 105 MMOL/L (ref 98–107)
CHOLESTEROL, FASTING: 142 MG/DL (ref 0–199)
CO2: 26 MMOL/L (ref 22–29)
CREAT SERPL-MCNC: 0.7 MG/DL (ref 0.5–1)
GFR AFRICAN AMERICAN: >60
GFR NON-AFRICAN AMERICAN: >60 ML/MIN/1.73
GLUCOSE FASTING: 100 MG/DL (ref 74–99)
HCT VFR BLD CALC: 42.1 % (ref 34–48)
HDLC SERPL-MCNC: 58 MG/DL
HEMOGLOBIN: 14.6 G/DL (ref 11.5–15.5)
LDL CHOLESTEROL CALCULATED: 72 MG/DL (ref 0–99)
MCH RBC QN AUTO: 29.5 PG (ref 26–35)
MCHC RBC AUTO-ENTMCNC: 34.7 % (ref 32–34.5)
MCV RBC AUTO: 85.1 FL (ref 80–99.9)
PDW BLD-RTO: 12.3 FL (ref 11.5–15)
PLATELET # BLD: 160 E9/L (ref 130–450)
PMV BLD AUTO: 12.1 FL (ref 7–12)
POTASSIUM SERPL-SCNC: 4.1 MMOL/L (ref 3.5–5)
RBC # BLD: 4.95 E12/L (ref 3.5–5.5)
SODIUM BLD-SCNC: 142 MMOL/L (ref 132–146)
TOTAL PROTEIN: 6.9 G/DL (ref 6.4–8.3)
TRIGLYCERIDE, FASTING: 58 MG/DL (ref 0–149)
VLDLC SERPL CALC-MCNC: 12 MG/DL
WBC # BLD: 4.7 E9/L (ref 4.5–11.5)

## 2019-03-17 PROCEDURE — 80053 COMPREHEN METABOLIC PANEL: CPT

## 2019-03-17 PROCEDURE — 80061 LIPID PANEL: CPT

## 2019-03-17 PROCEDURE — 85027 COMPLETE CBC AUTOMATED: CPT

## 2019-03-17 PROCEDURE — 36415 COLL VENOUS BLD VENIPUNCTURE: CPT

## 2019-03-25 ENCOUNTER — OFFICE VISIT (OUTPATIENT)
Dept: PRIMARY CARE CLINIC | Age: 59
End: 2019-03-25
Payer: COMMERCIAL

## 2019-03-25 VITALS
TEMPERATURE: 97.4 F | HEART RATE: 80 BPM | DIASTOLIC BLOOD PRESSURE: 70 MMHG | SYSTOLIC BLOOD PRESSURE: 110 MMHG | BODY MASS INDEX: 34.67 KG/M2 | WEIGHT: 228 LBS

## 2019-03-25 DIAGNOSIS — G89.29 CHRONIC MIDLINE LOW BACK PAIN WITHOUT SCIATICA: ICD-10-CM

## 2019-03-25 DIAGNOSIS — M77.8 TENDINITIS OF RIGHT ELBOW: ICD-10-CM

## 2019-03-25 DIAGNOSIS — R05.3 CHRONIC COUGH: ICD-10-CM

## 2019-03-25 DIAGNOSIS — I10 HTN (HYPERTENSION), BENIGN: ICD-10-CM

## 2019-03-25 DIAGNOSIS — M19.90 ARTHRITIS: ICD-10-CM

## 2019-03-25 DIAGNOSIS — M54.50 CHRONIC MIDLINE LOW BACK PAIN WITHOUT SCIATICA: ICD-10-CM

## 2019-03-25 DIAGNOSIS — E78.2 MIXED HYPERLIPIDEMIA: Primary | ICD-10-CM

## 2019-03-25 PROCEDURE — 99213 OFFICE O/P EST LOW 20 MIN: CPT | Performed by: FAMILY MEDICINE

## 2019-03-25 ASSESSMENT — PATIENT HEALTH QUESTIONNAIRE - PHQ9
2. FEELING DOWN, DEPRESSED OR HOPELESS: 0
1. LITTLE INTEREST OR PLEASURE IN DOING THINGS: 0
SUM OF ALL RESPONSES TO PHQ9 QUESTIONS 1 & 2: 0
SUM OF ALL RESPONSES TO PHQ QUESTIONS 1-9: 0
SUM OF ALL RESPONSES TO PHQ QUESTIONS 1-9: 0

## 2019-03-25 ASSESSMENT — ENCOUNTER SYMPTOMS
GASTROINTESTINAL NEGATIVE: 1
EYES NEGATIVE: 1
ALLERGIC/IMMUNOLOGIC NEGATIVE: 1
COUGH: 1

## 2019-04-08 ENCOUNTER — HOSPITAL ENCOUNTER (EMERGENCY)
Age: 59
Discharge: HOME | End: 2019-04-08
Payer: COMMERCIAL

## 2019-04-08 ENCOUNTER — APPOINTMENT (OUTPATIENT)
Dept: GENERAL RADIOLOGY | Age: 59
End: 2019-04-08
Payer: COMMERCIAL

## 2019-04-08 VITALS
BODY MASS INDEX: 34.97 KG/M2 | SYSTOLIC BLOOD PRESSURE: 151 MMHG | OXYGEN SATURATION: 97 % | TEMPERATURE: 98.1 F | DIASTOLIC BLOOD PRESSURE: 92 MMHG | RESPIRATION RATE: 20 BRPM | WEIGHT: 230 LBS | HEART RATE: 66 BPM

## 2019-04-08 DIAGNOSIS — S13.9XXA NECK SPRAIN, INITIAL ENCOUNTER: ICD-10-CM

## 2019-04-08 DIAGNOSIS — S30.0XXA CONTUSION OF LOWER BACK, INITIAL ENCOUNTER: Primary | ICD-10-CM

## 2019-04-08 PROCEDURE — 72050 X-RAY EXAM NECK SPINE 4/5VWS: CPT

## 2019-04-08 PROCEDURE — 72110 X-RAY EXAM L-2 SPINE 4/>VWS: CPT

## 2019-04-08 PROCEDURE — 99212 OFFICE O/P EST SF 10 MIN: CPT

## 2019-04-08 PROCEDURE — 72072 X-RAY EXAM THORAC SPINE 3VWS: CPT

## 2019-04-08 RX ORDER — OXYCODONE HYDROCHLORIDE AND ACETAMINOPHEN 5; 325 MG/1; MG/1
1 TABLET ORAL EVERY 8 HOURS PRN
COMMUNITY
End: 2022-01-23 | Stop reason: ALTCHOICE

## 2019-04-08 RX ORDER — NAPROXEN 500 MG/1
500 TABLET ORAL 2 TIMES DAILY
Qty: 14 TABLET | Refills: 0 | Status: SHIPPED | OUTPATIENT
Start: 2019-04-08 | End: 2019-06-10

## 2019-04-08 RX ORDER — METHOCARBAMOL 750 MG/1
750 TABLET, FILM COATED ORAL 2 TIMES DAILY PRN
COMMUNITY
End: 2019-05-28 | Stop reason: SDUPTHER

## 2019-04-08 ASSESSMENT — PAIN DESCRIPTION - FREQUENCY: FREQUENCY: CONTINUOUS

## 2019-04-08 ASSESSMENT — PAIN DESCRIPTION - LOCATION: LOCATION: BACK;NECK

## 2019-04-08 ASSESSMENT — PAIN DESCRIPTION - ORIENTATION: ORIENTATION: MID;LOWER

## 2019-04-08 ASSESSMENT — PAIN DESCRIPTION - PROGRESSION: CLINICAL_PROGRESSION: GRADUALLY WORSENING

## 2019-04-08 ASSESSMENT — PAIN SCALES - GENERAL: PAINLEVEL_OUTOF10: 6

## 2019-04-08 ASSESSMENT — PAIN DESCRIPTION - DESCRIPTORS: DESCRIPTORS: ACHING;DISCOMFORT;TIGHTNESS

## 2019-04-08 ASSESSMENT — PAIN DESCRIPTION - PAIN TYPE: TYPE: ACUTE PAIN

## 2019-04-08 NOTE — LETTER
[facility]  The [address] [place] [address]  Phone: [phone]               April 8, 2019    Patient: Bibiana Taylor   YOB: 1960   Date of Visit: 4/8/2019       To Whom It May Concern:    Bibiana Taylor was seen and treated in our emergency department on 4/8/2019. Please excuse her from the remaining part of the work day today. She may return to work on 04/09/2019.       Sincerely,       Shalini Lew LPN         Signature:__________________________________

## 2019-04-08 NOTE — ED PROVIDER NOTES
history. She has never used smokeless tobacco. She reports that she drinks alcohol. She reports that she does not use drugs. Family History: family history includes Cancer in her mother; Heart Failure in her father; Hypertension in her mother; Thyroid Disease in her mother. Allergies: Penicillins    Physical Exam   Oxygen Saturation Interpretation: Normal.   ED Triage Vitals [04/08/19 1026]   BP Temp Temp Source Pulse Resp SpO2 Height Weight   (!) 151/92 98.1 °F (36.7 °C) Oral 66 20 97 % -- 230 lb (104.3 kg)       Physical Exam  · Constitutional/General: Alert and oriented x3, well appearing, non toxic in NAD  · HEENT:  NC/NT. PERRLA,  Airway patent. · Neck: , non tender to palpation in the midline, no stridor, no crepitus, no meningeal signs  · Respiratory: Lungs clear to auscultation bilaterally, no wheezes, rales, or rhonchi. Not in respiratory distress  · CV:  Regular rate. Regular rhythm. No murmurs, gallops, or rubs. 2+ distal pulses  · Chest: No chest wall tenderness  · GI:  Abdomen Soft, Non tender, Non distended. +BS. No rebound, guarding, or rigidity. No pulsatile masses. · Musculoskeletal: Moves all extremities x 4. Warm and well perfused, no clubbing, cyanosis, or edema. Capillary refill <3 seconds tenderness over the cervical thoracic or lumbar spine to palpation. No ecchymosis or abrasions noted. She has full range of motion she has full strength in her upper and lower extremities. · Integument: skin warm and dry. No rashes. · Lymphatic: no lymphadenopathy noted  · Neurologic: GCS 15, no focal deficits, symmetric strength 5/5 in the upper and lower extremities bilaterally  · Psychiatric: Normal Affect    Lab / Imaging Results   (All laboratory and radiology results have been personally reviewed by myself)  Labs:  No results found for this visit on 04/08/19. Imaging: All Radiology results interpreted by Radiologist unless otherwise noted.   XR CERVICAL SPINE (4-5 VIEWS)   Final Result   No acute radiographic findings. XR THORACIC SPINE (3 VIEWS)   Final Result   No acute radiographic findings. XR LUMBAR SPINE (MIN 4 VIEWS)   Final Result   No acute radiographic findings. ED Course / Medical Decision Making   Medications - No data to display       Consult(s):   None    Procedure(s):   none    MDM:   Patient tripped while carrying an object backwards at work today and fell landing directly on her back. She's having some mid and low back pain and also some discomfort and stiffness in her neck, she  did not hit her head does not really have neck pain has full strength in her upper or lower extremities she ambulates without difficulty has no pain with position changes. She is nontender to palpation I did obtain x-rays of the cervical thoracic and lumbar spine and they were all negative. I advised her to follow-up with corporate care if she has any problems and I order her Naprosyn to take as needed for pain    Counseling:    I have  spoken with the patient and discussed todays results, in addition to providing specific details for the plan of care and counseling regarding the diagnosis and prognosis. Questions are answered at this time and they are agreeable with the plan. Assessment      1. Contusion of lower back, initial encounter    2. Neck sprain, initial encounter      Plan   Discharge to home and advised to contact Hay Thayer MD  [address] [phone]      As needed    [address]   [phone]      As needed   Patient condition is good    New Medications     New Prescriptions    NAPROXEN (NAPROSYN) 500 MG TABLET    Take 1 tablet by mouth 2 times daily for 7 days PRN/pain     Electronically signed by NATHALY Abrams CNP   DD: 4/8/19  **This report was transcribed using voice recognition software.  Every effort was made to ensure accuracy; however, inadvertent computerized transcription errors may be present.   END OF ED PROVIDER NOTE     Amy Cortez, APRN - CNP  04/08/19 1123

## 2019-04-13 ENCOUNTER — HOSPITAL ENCOUNTER (OUTPATIENT)
Age: 59
Discharge: HOME | End: 2019-04-15
Payer: COMMERCIAL

## 2019-04-13 ENCOUNTER — HOSPITAL ENCOUNTER (OUTPATIENT)
Dept: GENERAL RADIOLOGY | Age: 59
Discharge: HOME | End: 2019-04-15
Payer: COMMERCIAL

## 2019-04-13 DIAGNOSIS — R05.3 CHRONIC COUGH: ICD-10-CM

## 2019-04-13 PROCEDURE — 71046 X-RAY EXAM CHEST 2 VIEWS: CPT

## 2019-04-29 ENCOUNTER — OFFICE VISIT (OUTPATIENT)
Dept: PRIMARY CARE CLINIC | Age: 59
End: 2019-04-29
Payer: COMMERCIAL

## 2019-04-29 VITALS
BODY MASS INDEX: 34.82 KG/M2 | TEMPERATURE: 98.2 F | WEIGHT: 229 LBS | HEART RATE: 88 BPM | DIASTOLIC BLOOD PRESSURE: 80 MMHG | SYSTOLIC BLOOD PRESSURE: 118 MMHG

## 2019-04-29 DIAGNOSIS — M54.50 CHRONIC MIDLINE LOW BACK PAIN WITHOUT SCIATICA: ICD-10-CM

## 2019-04-29 DIAGNOSIS — E78.2 MIXED HYPERLIPIDEMIA: Primary | ICD-10-CM

## 2019-04-29 DIAGNOSIS — I10 HTN (HYPERTENSION), BENIGN: ICD-10-CM

## 2019-04-29 DIAGNOSIS — R05.3 CHRONIC COUGH: ICD-10-CM

## 2019-04-29 DIAGNOSIS — E87.6 HYPOKALEMIA: ICD-10-CM

## 2019-04-29 DIAGNOSIS — G89.29 CHRONIC MIDLINE LOW BACK PAIN WITHOUT SCIATICA: ICD-10-CM

## 2019-04-29 PROCEDURE — 99213 OFFICE O/P EST LOW 20 MIN: CPT | Performed by: FAMILY MEDICINE

## 2019-04-29 RX ORDER — TRIAMTERENE AND HYDROCHLOROTHIAZIDE 37.5; 25 MG/1; MG/1
1 TABLET ORAL DAILY
Qty: 90 TABLET | Refills: 1 | Status: SHIPPED | OUTPATIENT
Start: 2019-04-29 | End: 2019-11-04 | Stop reason: SDUPTHER

## 2019-04-29 RX ORDER — SIMVASTATIN 10 MG
10 TABLET ORAL DAILY
Qty: 90 TABLET | Refills: 1 | Status: SHIPPED | OUTPATIENT
Start: 2019-04-29 | End: 2019-11-04 | Stop reason: SDUPTHER

## 2019-04-29 RX ORDER — POTASSIUM CHLORIDE 750 MG/1
10 TABLET, FILM COATED, EXTENDED RELEASE ORAL 2 TIMES DAILY
Qty: 180 TABLET | Refills: 1 | Status: SHIPPED | OUTPATIENT
Start: 2019-04-29 | End: 2019-11-04 | Stop reason: SDUPTHER

## 2019-04-29 ASSESSMENT — ENCOUNTER SYMPTOMS
COUGH: 1
ALLERGIC/IMMUNOLOGIC NEGATIVE: 1
EYES NEGATIVE: 1
GASTROINTESTINAL NEGATIVE: 1

## 2019-04-29 NOTE — PROGRESS NOTES
OFFICE PROGRESS NOTE      SUBJECTIVE:        Patient ID:   Jimi Tian is a 62 y.o. female who presents for   Chief Complaint   Patient presents with    Hypertension     Here for recheck on Hypertension, Hyperlipidemia and cough. Continues to experience a dry cough and sneezing. Xray done,here for review. HPI:   Patient states his sister continues to cough  X-ray chest is better and normal        Prior to Admission medications    Medication Sig Start Date End Date Taking? Authorizing Provider   triamterene-hydrochlorothiazide (MAXZIDE-25) 37.5-25 MG per tablet Take 1 tablet by mouth daily 4/29/19  Yes Oleg Gallegos MD   simvastatin (ZOCOR) 10 MG tablet Take 1 tablet by mouth daily 4/29/19  Yes Oleg Gallegos MD   potassium chloride (KLOR-CON) 10 MEQ extended release tablet Take 1 tablet by mouth 2 times daily 4/29/19  Yes Oleg Gallegos MD   methocarbamol (ROBAXIN) 750 MG tablet Take 750 mg by mouth 2 times daily as needed   Yes Historical Provider, MD   oxyCODONE-acetaminophen (PERCOCET) 5-325 MG per tablet Take 1 tablet by mouth every 8 hours as needed for Pain.    Yes Historical Provider, MD   Coenzyme Q10 (COQ10) 200 MG CAPS Take by mouth daily   Yes Historical Provider, MD   Pyridoxine HCl (VITAMIN B-6 PO) Take by mouth daily   Yes Historical Provider, MD   naproxen (NAPROSYN) 500 MG tablet Take 1 tablet by mouth 2 times daily for 7 days PRN/pain 4/8/19 4/15/19  NATHALY Miranda - CNP        Social History     Socioeconomic History    Marital status:      Spouse name: None    Number of children: None    Years of education: None    Highest education level: None   Occupational History    None   Social Needs    Financial resource strain: None    Food insecurity:     Worry: None     Inability: None    Transportation needs:     Medical: None     Non-medical: None   Tobacco Use    Smoking status: Former Smoker     Packs/day: 0.30 Years: 15.00     Pack years: 4.50     Types: Cigarettes     Last attempt to quit: 1989     Years since quittin.2    Smokeless tobacco: Never Used   Substance and Sexual Activity    Alcohol use: Yes     Comment: occasional beer or wine    Drug use: No    Sexual activity: Yes     Partners: Male   Lifestyle    Physical activity:     Days per week: None     Minutes per session: None    Stress: None   Relationships    Social connections:     Talks on phone: None     Gets together: None     Attends Hindu service: None     Active member of club or organization: None     Attends meetings of clubs or organizations: None     Relationship status: None    Intimate partner violence:     Fear of current or ex partner: None     Emotionally abused: None     Physically abused: None     Forced sexual activity: None   Other Topics Concern    None   Social History Narrative    None       I have reviewed Dayanna's allergies, medications, problem list, medical, social and family history and have updated as needed in the electronic medical record  Review Of Systems:    Review of Systems   Constitutional: Negative. HENT: Negative. Eyes: Negative. Respiratory: Positive for cough. Cardiovascular: Negative. Gastrointestinal: Negative. Endocrine: Negative. Musculoskeletal: Negative. Skin: Negative. Allergic/Immunologic: Negative. Neurological: Negative. Hematological: Negative. Psychiatric/Behavioral: Negative.                OBJECTIVE:     VS:  Wt Readings from Last 3 Encounters:   19 229 lb (103.9 kg)   19 230 lb (104.3 kg)   19 228 lb (103.4 kg)     Temp Readings from Last 3 Encounters:   19 98.2 °F (36.8 °C) (Oral)   19 98.1 °F (36.7 °C) (Oral)   19 97.4 °F (36.3 °C) (Oral)     BP Readings from Last 3 Encounters:   19 118/80   19 (!) 151/92   19 110/70        Physical Exam   Constitutional: She is oriented to person, place, and time. She appears well-developed and well-nourished. HENT:   Head: Normocephalic and atraumatic. Mouth/Throat: Oropharynx is clear and moist.   Eyes: Pupils are equal, round, and reactive to light. Conjunctivae are normal.   Neck: Normal range of motion. Neck supple. Cardiovascular: Normal rate, regular rhythm, normal heart sounds and intact distal pulses. Pulmonary/Chest: Effort normal and breath sounds normal.   Abdominal: Soft. Bowel sounds are normal.   Musculoskeletal: Normal range of motion. Neurological: She is alert and oriented to person, place, and time. Skin: Skin is warm and dry. Psychiatric: Thought content normal.          Labs :    Lab Results   Component Value Date    WBC 4.7 03/17/2019    HGB 14.6 03/17/2019    HCT 42.1 03/17/2019     03/17/2019    CHOL 169 11/10/2018    TRIG 75 11/10/2018    HDL 58 03/17/2019    ALT 21 03/17/2019    AST 26 03/17/2019     03/17/2019    K 4.1 03/17/2019     03/17/2019    CREATININE 0.7 03/17/2019    BUN 13 03/17/2019    CO2 26 03/17/2019    TSH 1.010 03/11/2017    INR 0.9 03/10/2017    GLUF 100 (H) 03/17/2019     No results found for: VOL, APPEARANCE, COLORU, LABSPEC, LABPH, LEUKBLD, NITRU, GLUCOSEU, KETUA, UROBILINOGEN, KETUA, UROBILINOGEN, BILIRUBINUR, OCBU  No results found for: PSA, CEA, , [id number],       Controlled Substances Monitoring:                                    ASSESSMENT     Patient Active Problem List    Diagnosis Date Noted    Back pain 03/05/2018    HTN (hypertension), benign 03/10/2017    Hyperlipemia 03/10/2017    Hypokalemia 03/10/2017        Diagnosis:     ICD-10-CM    1. Mixed hyperlipidemia E78.2    2. HTN (hypertension), benign I10    3. Hypokalemia E87.6    4. Chronic midline low back pain without sciatica M54.5     G89.29    5.  Chronic cough R05        PLAN:   Claritin 10 mg daily  Continue the medication  Return to clinic earlier if any problems  Force fluids  Regular exercises        Patient Instructions   Take Claritin 10 mg daily  Return to clinic earlier if any problems      Return in about 6 weeks (around 6/10/2019). Rosemarie Masterson reviewed my findings and recommendations with Cassandra Ansari.     Electronicallysigned by Dianna Stevenson MD on 4/29/19 at 4:47 PM

## 2019-05-28 ENCOUNTER — HOSPITAL ENCOUNTER (EMERGENCY)
Age: 59
Discharge: HOME | End: 2019-05-28
Payer: COMMERCIAL

## 2019-05-28 VITALS
DIASTOLIC BLOOD PRESSURE: 78 MMHG | HEART RATE: 71 BPM | RESPIRATION RATE: 14 BRPM | SYSTOLIC BLOOD PRESSURE: 132 MMHG | TEMPERATURE: 98.3 F | BODY MASS INDEX: 34.67 KG/M2 | OXYGEN SATURATION: 98 % | WEIGHT: 228 LBS

## 2019-05-28 DIAGNOSIS — R30.0 DYSURIA: Primary | ICD-10-CM

## 2019-05-28 DIAGNOSIS — M54.50 ACUTE RIGHT-SIDED LOW BACK PAIN WITHOUT SCIATICA: ICD-10-CM

## 2019-05-28 LAB
BACTERIA: ABNORMAL /HPF
BILIRUBIN URINE: NEGATIVE
BLOOD, URINE: ABNORMAL
CLARITY: ABNORMAL
COLOR: YELLOW
EPITHELIAL CELLS, UA: ABNORMAL /HPF
GLUCOSE URINE: NEGATIVE MG/DL
KETONES, URINE: NEGATIVE MG/DL
LEUKOCYTE ESTERASE, URINE: NEGATIVE
NITRITE, URINE: NEGATIVE
PH UA: 6.5 (ref 5–9)
PROTEIN UA: NEGATIVE MG/DL
RBC UA: ABNORMAL /HPF (ref 0–2)
SPECIFIC GRAVITY UA: 1.02 (ref 1–1.03)
UROBILINOGEN, URINE: 0.2 E.U./DL
WBC UA: ABNORMAL /HPF (ref 0–5)

## 2019-05-28 PROCEDURE — 99212 OFFICE O/P EST SF 10 MIN: CPT

## 2019-05-28 PROCEDURE — 87088 URINE BACTERIA CULTURE: CPT

## 2019-05-28 PROCEDURE — 81001 URINALYSIS AUTO W/SCOPE: CPT

## 2019-05-28 RX ORDER — METHOCARBAMOL 750 MG/1
750 TABLET, FILM COATED ORAL 2 TIMES DAILY PRN
Qty: 14 TABLET | Refills: 0 | Status: SHIPPED | OUTPATIENT
Start: 2019-05-28

## 2019-05-28 RX ORDER — NITROFURANTOIN 25; 75 MG/1; MG/1
100 CAPSULE ORAL 2 TIMES DAILY
Qty: 10 CAPSULE | Refills: 0 | Status: SHIPPED | OUTPATIENT
Start: 2019-05-28 | End: 2019-06-02

## 2019-05-28 ASSESSMENT — PAIN DESCRIPTION - LOCATION: LOCATION: BACK

## 2019-05-28 ASSESSMENT — PAIN DESCRIPTION - DESCRIPTORS: DESCRIPTORS: BURNING;ACHING

## 2019-05-28 ASSESSMENT — PAIN DESCRIPTION - ORIENTATION: ORIENTATION: RIGHT;MID

## 2019-05-28 ASSESSMENT — PAIN SCALES - GENERAL: PAINLEVEL_OUTOF10: 8

## 2019-05-28 NOTE — ED PROVIDER NOTES
This is a 62year old female that presents to urgent care with a complaint of right sided lower back pain for months. Says pain is worse with movement. Has been having some dysuria. Denies flank pain. No chest pain or shortness of breath. Review of Systems   Constitutional:        Pertinent positives and negatives are stated within HPI, all other systems reviewed and are negative. Physical Exam   Constitutional: She is oriented to person, place, and time. She appears well-developed and well-nourished. HENT:   Head: Normocephalic and atraumatic. Right Ear: Hearing and external ear normal.   Left Ear: Hearing and external ear normal.   Nose: Nose normal.   Mouth/Throat: Uvula is midline, oropharynx is clear and moist and mucous membranes are normal.   Eyes: Pupils are equal, round, and reactive to light. Conjunctivae, EOM and lids are normal.   Neck: Normal range of motion. Neck supple. Cardiovascular: Normal rate, regular rhythm and normal heart sounds. No murmur heard. Pulmonary/Chest: Effort normal and breath sounds normal.   Abdominal: Soft. Bowel sounds are normal. There is no tenderness. There is no rigidity, no rebound, no guarding and no CVA tenderness. Musculoskeletal: She exhibits no edema. R sided lumbar back tenderness. Pain is worse with movement and palpation. Muscle strength bilaterally 5/5. Reflexes plus 3. Neurological: She is alert and oriented to person, place, and time. She has normal strength. No cranial nerve deficit or sensory deficit. Coordination and gait normal. GCS eye subscore is 4. GCS verbal subscore is 5. GCS motor subscore is 6. Skin: Skin is warm and dry. No abrasion and no rash noted. Nursing note and vitals reviewed. Procedures    MDM  Number of Diagnoses or Management Options  Acute right-sided low back pain without sciatica:   Dysuria:   Diagnosis management comments: Pain is prob m/s in origin but has some dysuria.  Will place on antibiotic and have her take antiinflammatory and muscle relaxer. Moon Rico --------------------------------------------- PAST HISTORY ---------------------------------------------  Past Medical History:  has a past medical history of Back pain, H/O cardiovascular stress test, Hyperlipidemia, Hypertension, and PONV (postoperative nausea and vomiting). Past Surgical History:  has a past surgical history that includes back surgery (2009); Cholecystectomy (1998); partial hysterectomy (cervix not removed) (2003); Foot surgery (Right); Foot surgery (Left); Colonoscopy; Hysterectomy; and other surgical history (02/24/2017). Social History:  reports that she quit smoking about 30 years ago. Her smoking use included cigarettes. She has a 4.50 pack-year smoking history. She has never used smokeless tobacco. She reports that she drinks alcohol. She reports that she does not use drugs. Family History: family history includes Cancer in her mother; Heart Failure in her father; Hypertension in her mother; Thyroid Disease in her mother. The patients home medications have been reviewed.     Allergies: Penicillins    -------------------------------------------------- RESULTS -------------------------------------------------  Results for orders placed or performed during the hospital encounter of 05/28/19   Urinalysis   Result Value Ref Range    Color, UA Yellow Straw/Yellow    Clarity, UA SL CLOUDY Clear    Glucose, Ur Negative Negative mg/dL    Bilirubin Urine Negative Negative    Ketones, Urine Negative Negative mg/dL    Specific Gravity, UA 1.020 1.005 - 1.030    Blood, Urine TRACE (A) Negative    pH, UA 6.5 5.0 - 9.0    Protein, UA Negative Negative mg/dL    Urobilinogen, Urine 0.2 <2.0 E.U./dL    Nitrite, Urine Negative Negative    Leukocyte Esterase, Urine Negative Negative   Microscopic Urinalysis   Result Value Ref Range    WBC, UA NONE 0 - 5 /HPF    RBC, UA 0-1 0 - 2 /HPF    Epi Cells RARE /HPF    Bacteria, UA MODERATE (A) /HPF     No orders to display       ------------------------- NURSING NOTES AND VITALS REVIEWED ---------------------------   The nursing notes within the ED encounter and vital signs as below have been reviewed. /78   Pulse 71   Temp 98.3 °F (36.8 °C) (Oral)   Resp 14   Wt 228 lb (103.4 kg)   SpO2 98%   BMI 34.67 kg/m²   Oxygen Saturation Interpretation: Normal      ------------------------------------------ PROGRESS NOTES ------------------------------------------   I have spoken with the patient and discussed todays results, in addition to providing specific details for the plan of care and counseling regarding the diagnosis and prognosis. Their questions are answered at this time and they are agreeable with the plan.      --------------------------------- ADDITIONAL PROVIDER NOTES ---------------------------------     This patient is stable for discharge. I have shared the specific conditions for return, as well as the importance of follow-up. * NOTE: This report was transcribed using voice recognition software. Every effort was made to ensure accuracy; however, inadvertent computerized transcription errors may be present.    --------------------------------- IMPRESSION AND DISPOSITION ---------------------------------    IMPRESSION  1. Dysuria    2.  Acute right-sided low back pain without sciatica        DISPOSITION  Disposition: Discharge to home  Patient condition is good         Nataliya Bridges PA-C  05/28/19 [id number]

## 2019-05-30 LAB — URINE CULTURE, ROUTINE: NORMAL

## 2019-06-10 ENCOUNTER — OFFICE VISIT (OUTPATIENT)
Dept: PRIMARY CARE CLINIC | Age: 59
End: 2019-06-10
Payer: COMMERCIAL

## 2019-06-10 VITALS
TEMPERATURE: 98.5 F | BODY MASS INDEX: 34.97 KG/M2 | HEART RATE: 76 BPM | SYSTOLIC BLOOD PRESSURE: 120 MMHG | DIASTOLIC BLOOD PRESSURE: 66 MMHG | WEIGHT: 230 LBS

## 2019-06-10 DIAGNOSIS — R05.3 CHRONIC COUGH: Primary | ICD-10-CM

## 2019-06-10 DIAGNOSIS — G89.29 CHRONIC MIDLINE LOW BACK PAIN WITHOUT SCIATICA: ICD-10-CM

## 2019-06-10 DIAGNOSIS — E78.2 MIXED HYPERLIPIDEMIA: ICD-10-CM

## 2019-06-10 DIAGNOSIS — M54.50 CHRONIC MIDLINE LOW BACK PAIN WITHOUT SCIATICA: ICD-10-CM

## 2019-06-10 DIAGNOSIS — I10 HTN (HYPERTENSION), BENIGN: ICD-10-CM

## 2019-06-10 DIAGNOSIS — M19.90 ARTHRITIS: ICD-10-CM

## 2019-06-10 DIAGNOSIS — N39.0 URINARY TRACT INFECTION WITHOUT HEMATURIA, SITE UNSPECIFIED: ICD-10-CM

## 2019-06-10 PROCEDURE — 99213 OFFICE O/P EST LOW 20 MIN: CPT | Performed by: FAMILY MEDICINE

## 2019-06-10 PROCEDURE — 81003 URINALYSIS AUTO W/O SCOPE: CPT | Performed by: FAMILY MEDICINE

## 2019-06-10 RX ORDER — NAPROXEN SODIUM 220 MG
220 TABLET ORAL 2 TIMES DAILY WITH MEALS
COMMUNITY
End: 2022-10-03

## 2019-06-10 ASSESSMENT — ENCOUNTER SYMPTOMS
GASTROINTESTINAL NEGATIVE: 1
EYES NEGATIVE: 1
COUGH: 1
ALLERGIC/IMMUNOLOGIC NEGATIVE: 1

## 2019-06-10 NOTE — PATIENT INSTRUCTIONS
Take the medication as prescribed  Compresses to the  Back  Lab work and x-rays done  To clinic earlier if any problems

## 2019-06-10 NOTE — PROGRESS NOTES
oriented to person, place, and time. She appears well-developed and well-nourished. HENT:   Head: Normocephalic and atraumatic. Mouth/Throat: Oropharynx is clear and moist.   Eyes: Pupils are equal, round, and reactive to light. Conjunctivae are normal.   Neck: Normal range of motion. Neck supple. Cardiovascular: Normal rate, regular rhythm, normal heart sounds and intact distal pulses. Pulmonary/Chest: Effort normal and breath sounds normal.   Abdominal: Soft. Bowel sounds are normal.   Musculoskeletal: Normal range of motion. Tenderness of the left rib cage   Neurological: She is alert and oriented to person, place, and time. Skin: Skin is warm and dry. Psychiatric: Thought content normal.          Labs :    Lab Results   Component Value Date    WBC 4.7 03/17/2019    HGB 14.6 03/17/2019    HCT 42.1 03/17/2019     03/17/2019    CHOL 169 11/10/2018    TRIG 75 11/10/2018    HDL 58 03/17/2019    ALT 21 03/17/2019    AST 26 03/17/2019     03/17/2019    K 4.1 03/17/2019     03/17/2019    CREATININE 0.7 03/17/2019    BUN 13 03/17/2019    CO2 26 03/17/2019    TSH 1.010 03/11/2017    INR 0.9 03/10/2017    GLUF 100 (H) 03/17/2019     Lab Results   Component Value Date    COLORU Yellow 05/28/2019    NITRU Negative 05/28/2019    GLUCOSEU Negative 05/28/2019    KETUA Negative 05/28/2019    UROBILINOGEN 0.2 05/28/2019    BILIRUBINUR Negative 05/28/2019     No results found for: PSA, CEA, , [id number],       Controlled Substances Monitoring:                                    ASSESSMENT     Patient Active Problem List    Diagnosis Date Noted    Back pain 03/05/2018    HTN (hypertension), benign 03/10/2017    Hyperlipemia 03/10/2017    Hypokalemia 03/10/2017        Diagnosis:     ICD-10-CM    1. Chronic cough R05 XR CHEST (SINGLE VIEW FRONTAL)     CBC WITH AUTO DIFFERENTIAL   2. HTN (hypertension), benign I10    3. Mixed hyperlipidemia E78.2    4.  Chronic midline low back pain without sciatica M54.5 XR CHEST (SINGLE VIEW FRONTAL)    G89.29 XR THORACIC SPINE (2 VIEWS)   5. Arthritis M19.90    6. Urinary tract infection without hematuria, site unspecified N39.0 URINALYSIS       PLAN:     Force fluids  Low-sodium low-fat diet  Warm compresses to the back  Return to clinic earlier if any problems  Continue same medication    Patient Instructions   Take the medication as prescribed  Compresses to the  Back  Lab work and x-rays done  To clinic earlier if any problems      Return in about 1 week (around 6/17/2019). Karla Mcgrath reviewed my findings and recommendations with Naomy Alexis     Electronicallysigned by Azul White MD on 6/10/19 at 4:22 PM

## 2019-06-12 ENCOUNTER — HOSPITAL ENCOUNTER (OUTPATIENT)
Age: 59
Discharge: HOME | End: 2019-06-14
Payer: COMMERCIAL

## 2019-06-12 ENCOUNTER — HOSPITAL ENCOUNTER (OUTPATIENT)
Dept: GENERAL RADIOLOGY | Age: 59
Discharge: HOME | End: 2019-06-14
Payer: COMMERCIAL

## 2019-06-12 DIAGNOSIS — R05.3 CHRONIC COUGH: ICD-10-CM

## 2019-06-12 DIAGNOSIS — M54.50 CHRONIC MIDLINE LOW BACK PAIN WITHOUT SCIATICA: ICD-10-CM

## 2019-06-12 DIAGNOSIS — G89.29 CHRONIC MIDLINE LOW BACK PAIN WITHOUT SCIATICA: ICD-10-CM

## 2019-06-12 LAB
BACTERIA: NORMAL /HPF
BASOPHILS ABSOLUTE: 0.04 E9/L (ref 0–0.2)
BASOPHILS RELATIVE PERCENT: 0.5 % (ref 0–2)
BILIRUBIN URINE: NEGATIVE
BLOOD, URINE: ABNORMAL
CLARITY: CLEAR
COLOR: YELLOW
EOSINOPHILS ABSOLUTE: 0.16 E9/L (ref 0.05–0.5)
EOSINOPHILS RELATIVE PERCENT: 2.2 % (ref 0–6)
GLUCOSE URINE: NEGATIVE MG/DL
HCT VFR BLD CALC: 38.7 % (ref 34–48)
HEMOGLOBIN: 13.3 G/DL (ref 11.5–15.5)
IMMATURE GRANULOCYTES #: 0.02 E9/L
IMMATURE GRANULOCYTES %: 0.3 % (ref 0–5)
KETONES, URINE: NEGATIVE MG/DL
LEUKOCYTE ESTERASE, URINE: NEGATIVE
LYMPHOCYTES ABSOLUTE: 2 E9/L (ref 1.5–4)
LYMPHOCYTES RELATIVE PERCENT: 27.2 % (ref 20–42)
MCH RBC QN AUTO: 29.8 PG (ref 26–35)
MCHC RBC AUTO-ENTMCNC: 34.4 % (ref 32–34.5)
MCV RBC AUTO: 86.6 FL (ref 80–99.9)
MONOCYTES ABSOLUTE: 0.6 E9/L (ref 0.1–0.95)
MONOCYTES RELATIVE PERCENT: 8.2 % (ref 2–12)
NEUTROPHILS ABSOLUTE: 4.53 E9/L (ref 1.8–7.3)
NEUTROPHILS RELATIVE PERCENT: 61.6 % (ref 43–80)
NITRITE, URINE: NEGATIVE
PDW BLD-RTO: 12 FL (ref 11.5–15)
PH UA: 5.5 (ref 5–9)
PLATELET # BLD: 153 E9/L (ref 130–450)
PMV BLD AUTO: 11.8 FL (ref 7–12)
PROTEIN UA: NEGATIVE MG/DL
RBC # BLD: 4.47 E12/L (ref 3.5–5.5)
RBC UA: NORMAL /HPF (ref 0–2)
SPECIFIC GRAVITY UA: 1.01 (ref 1–1.03)
UROBILINOGEN, URINE: 0.2 E.U./DL
WBC # BLD: 7.4 E9/L (ref 4.5–11.5)
WBC UA: NORMAL /HPF (ref 0–5)

## 2019-06-12 PROCEDURE — 71046 X-RAY EXAM CHEST 2 VIEWS: CPT

## 2019-06-12 PROCEDURE — 72072 X-RAY EXAM THORAC SPINE 3VWS: CPT

## 2019-06-12 PROCEDURE — 36415 COLL VENOUS BLD VENIPUNCTURE: CPT

## 2019-06-12 PROCEDURE — 85025 COMPLETE CBC W/AUTO DIFF WBC: CPT

## 2019-06-12 PROCEDURE — 81001 URINALYSIS AUTO W/SCOPE: CPT

## 2019-06-20 ENCOUNTER — OFFICE VISIT (OUTPATIENT)
Dept: PRIMARY CARE CLINIC | Age: 59
End: 2019-06-20
Payer: COMMERCIAL

## 2019-06-20 VITALS
HEART RATE: 76 BPM | SYSTOLIC BLOOD PRESSURE: 128 MMHG | TEMPERATURE: 98.4 F | BODY MASS INDEX: 34.21 KG/M2 | DIASTOLIC BLOOD PRESSURE: 70 MMHG | WEIGHT: 225 LBS

## 2019-06-20 DIAGNOSIS — R05.3 CHRONIC COUGH: Primary | ICD-10-CM

## 2019-06-20 DIAGNOSIS — E78.2 MIXED HYPERLIPIDEMIA: ICD-10-CM

## 2019-06-20 DIAGNOSIS — G89.29 CHRONIC MIDLINE LOW BACK PAIN WITHOUT SCIATICA: ICD-10-CM

## 2019-06-20 DIAGNOSIS — I10 HTN (HYPERTENSION), BENIGN: ICD-10-CM

## 2019-06-20 DIAGNOSIS — M19.90 ARTHRITIS: ICD-10-CM

## 2019-06-20 DIAGNOSIS — M54.50 CHRONIC MIDLINE LOW BACK PAIN WITHOUT SCIATICA: ICD-10-CM

## 2019-06-20 PROCEDURE — 99213 OFFICE O/P EST LOW 20 MIN: CPT | Performed by: FAMILY MEDICINE

## 2019-06-20 RX ORDER — METHYLPREDNISOLONE 4 MG/1
TABLET ORAL
Qty: 1 KIT | Refills: 0 | Status: SHIPPED | OUTPATIENT
Start: 2019-06-20 | End: 2019-06-26

## 2019-06-20 ASSESSMENT — ENCOUNTER SYMPTOMS
GASTROINTESTINAL NEGATIVE: 1
EYES NEGATIVE: 1
ALLERGIC/IMMUNOLOGIC NEGATIVE: 1
COUGH: 1
BACK PAIN: 1

## 2019-06-20 NOTE — PROGRESS NOTES
OFFICE PROGRESS NOTE      SUBJECTIVE:        Patient ID:   Reina Parra is a 62 y.o. female who presents for   Chief Complaint   Patient presents with    Cough     Here for recheck on cough with back discomfort. Continues to experience symptoms. Xray done,here for review. HPI:   Still complaining of cough  Lab work is normal  She has a lot of pain in the back  X-ray showed degenerative disc disease and arthritis  Patient had to take the pain medications        Prior to Admission medications    Medication Sig Start Date End Date Taking? Authorizing Provider   methylPREDNISolone (MEDROL DOSEPACK) 4 MG tablet Take by mouth. 6/20/19 6/26/19 Yes Elizabeth Mullins MD   naproxen sodium (ALEVE) 220 MG tablet Take 220 mg by mouth 2 times daily (with meals)   Yes Historical Provider, MD   methocarbamol (ROBAXIN) 750 MG tablet Take 1 tablet by mouth 2 times daily as needed (for pain (may cause drowsiness)) 5/28/19  Yes Katie Davis PA-C   triamterene-hydrochlorothiazide (MAXZIDE-25) 37.5-25 MG per tablet Take 1 tablet by mouth daily 4/29/19  Yes Elizabeth uMllins MD   simvastatin (ZOCOR) 10 MG tablet Take 1 tablet by mouth daily 4/29/19  Yes Elizabeth Mullins MD   potassium chloride (KLOR-CON) 10 MEQ extended release tablet Take 1 tablet by mouth 2 times daily 4/29/19  Yes Elizabeth Mullins MD   oxyCODONE-acetaminophen (PERCOCET) 5-325 MG per tablet Take 1 tablet by mouth every 8 hours as needed for Pain.    Yes Historical Provider, MD   Coenzyme Q10 (COQ10) 200 MG CAPS Take by mouth daily   Yes Historical Provider, MD   Pyridoxine HCl (VITAMIN B-6 PO) Take by mouth daily   Yes Historical Provider, MD        Social History     Socioeconomic History    Marital status:      Spouse name: None    Number of children: None    Years of education: None    Highest education level: None   Occupational History    None   Social Needs    Financial resource strain: None    Food insecurity:     Worry: None     Inability: None    Transportation needs:     Medical: None     Non-medical: None   Tobacco Use    Smoking status: Former Smoker     Packs/day: 0.30     Years: 15.00     Pack years: 4.50     Types: Cigarettes     Last attempt to quit: 1989     Years since quittin.3    Smokeless tobacco: Never Used   Substance and Sexual Activity    Alcohol use: Yes     Comment: occasional beer or wine    Drug use: No    Sexual activity: Yes     Partners: Male   Lifestyle    Physical activity:     Days per week: None     Minutes per session: None    Stress: None   Relationships    Social connections:     Talks on phone: None     Gets together: None     Attends [employer] service: None     Active member of club or organization: None     Attends meetings of clubs or organizations: None     Relationship status: None    Intimate partner violence:     Fear of current or ex partner: None     Emotionally abused: None     Physically abused: None     Forced sexual activity: None   Other Topics Concern    None   Social History Narrative    None       I have reviewed Dayanna's allergies, medications, problem list, medical, social and family history and have updated as needed in the electronic medical record  Review Of Systems:    Review of Systems   Constitutional: Negative. HENT: Negative. Eyes: Negative. Respiratory: Positive for cough. Cardiovascular: Negative. Gastrointestinal: Negative. Endocrine: Negative. Musculoskeletal: Positive for back pain. Skin: Negative. Allergic/Immunologic: Negative. Neurological: Negative. Hematological: Negative. Psychiatric/Behavioral: Negative.                OBJECTIVE:     VS:  Wt Readings from Last 3 Encounters:   19 225 lb (102.1 kg)   06/10/19 230 lb (104.3 kg)   19 228 lb (103.4 kg)     Temp Readings from Last 3 Encounters:   19 98.4 °F (36.9 °C) (Oral)   06/10/19 98.5 °F (36.9 °C) (Oral)   19 98.3 °F (36.8 °C) (Oral)     BP Readings from Last 3 Encounters:   06/20/19 128/70   06/10/19 120/66   05/28/19 132/78        Physical Exam   Constitutional: She is oriented to person, place, and time. She appears well-developed and well-nourished. HENT:   Head: Normocephalic and atraumatic. Mouth/Throat: Oropharynx is clear and moist.   Eyes: Pupils are equal, round, and reactive to light. Conjunctivae are normal.   Neck: Normal range of motion. Neck supple. Cardiovascular: Normal rate, regular rhythm, normal heart sounds and intact distal pulses. Pulmonary/Chest: Effort normal and breath sounds normal.   Abdominal: Soft. Bowel sounds are normal.   Musculoskeletal: Normal range of motion. Neurological: She is alert and oriented to person, place, and time. Skin: Skin is warm and dry. Psychiatric: Thought content normal.          Labs :    Lab Results   Component Value Date    WBC 7.4 06/12/2019    HGB 13.3 06/12/2019    HCT 38.7 06/12/2019     06/12/2019    CHOL 169 11/10/2018    TRIG 75 11/10/2018    HDL 58 03/17/2019    ALT 21 03/17/2019    AST 26 03/17/2019     03/17/2019    K 4.1 03/17/2019     03/17/2019    CREATININE 0.7 03/17/2019    BUN 13 03/17/2019    CO2 26 03/17/2019    TSH 1.010 03/11/2017    INR 0.9 03/10/2017    GLUF 100 (H) 03/17/2019     Lab Results   Component Value Date    COLORU Yellow 06/12/2019    NITRU Negative 06/12/2019    GLUCOSEU Negative 06/12/2019    KETUA Negative 06/12/2019    UROBILINOGEN 0.2 06/12/2019    BILIRUBINUR Negative 06/12/2019     No results found for: PSA, CEA, , [id number],       Controlled Substances Monitoring:                                    ASSESSMENT     Patient Active Problem List    Diagnosis Date Noted    Back pain 03/05/2018    HTN (hypertension), benign 03/10/2017    Hyperlipemia 03/10/2017    Hypokalemia 03/10/2017        Diagnosis:     ICD-10-CM    1. Chronic cough R05    2. HTN (hypertension), benign I10    3. Mixed hyperlipidemia E78.2    4. Chronic midline low back pain without sciatica M54.5     G89.29    5. Arthritis M19.90        PLAN:   Medrol Dosepak 4 mg with food  Continue the medications  Follow-up with a consultant  Low-sodium low-fat diet  Regular exercises  Return to clinic earlier if any problems        Patient Instructions   Continue present treatment  Follow-up with the back doctor  Low-sodium low-fat diet  Warm compresses to the back  Return to clinic earlier if any problems        Return in about 1 month (around 7/18/2019). Judi Padgett reviewed my findings and recommendations with Aleksandr Mcallister.     Electronicallysigned by Kristel Beltran MD on 6/20/19 at 4:38 PM

## 2019-11-04 ENCOUNTER — OFFICE VISIT (OUTPATIENT)
Dept: PRIMARY CARE CLINIC | Age: 59
End: 2019-11-04
Payer: COMMERCIAL

## 2019-11-04 VITALS
BODY MASS INDEX: 34.06 KG/M2 | WEIGHT: 224 LBS | HEART RATE: 80 BPM | DIASTOLIC BLOOD PRESSURE: 70 MMHG | SYSTOLIC BLOOD PRESSURE: 120 MMHG | TEMPERATURE: 98.2 F

## 2019-11-04 DIAGNOSIS — I10 HTN (HYPERTENSION), BENIGN: Primary | ICD-10-CM

## 2019-11-04 DIAGNOSIS — E78.2 MIXED HYPERLIPIDEMIA: ICD-10-CM

## 2019-11-04 DIAGNOSIS — E87.6 HYPOKALEMIA: ICD-10-CM

## 2019-11-04 PROCEDURE — 99214 OFFICE O/P EST MOD 30 MIN: CPT | Performed by: FAMILY MEDICINE

## 2019-11-04 RX ORDER — SIMVASTATIN 10 MG
10 TABLET ORAL DAILY
Qty: 90 TABLET | Refills: 1 | Status: SHIPPED | OUTPATIENT
Start: 2019-11-04 | End: 2020-01-27 | Stop reason: SDUPTHER

## 2019-11-04 RX ORDER — TRIAMTERENE AND HYDROCHLOROTHIAZIDE 37.5; 25 MG/1; MG/1
1 TABLET ORAL DAILY
Qty: 90 TABLET | Refills: 1 | Status: SHIPPED | OUTPATIENT
Start: 2019-11-04 | End: 2020-01-27 | Stop reason: SDUPTHER

## 2019-11-04 RX ORDER — POTASSIUM CHLORIDE 750 MG/1
10 TABLET, FILM COATED, EXTENDED RELEASE ORAL 2 TIMES DAILY
Qty: 180 TABLET | Refills: 1 | Status: SHIPPED | OUTPATIENT
Start: 2019-11-04 | End: 2020-01-27 | Stop reason: SDUPTHER

## 2019-11-04 ASSESSMENT — ENCOUNTER SYMPTOMS
GASTROINTESTINAL NEGATIVE: 1
RESPIRATORY NEGATIVE: 1
EYES NEGATIVE: 1
ALLERGIC/IMMUNOLOGIC NEGATIVE: 1
BACK PAIN: 1

## 2019-11-08 ENCOUNTER — OFFICE VISIT (OUTPATIENT)
Dept: FAMILY MEDICINE CLINIC | Age: 59
End: 2019-11-08
Payer: COMMERCIAL

## 2019-11-08 ENCOUNTER — HOSPITAL ENCOUNTER (OUTPATIENT)
Age: 59
Discharge: HOME | End: 2019-11-10
Payer: COMMERCIAL

## 2019-11-08 VITALS
BODY MASS INDEX: 33.8 KG/M2 | WEIGHT: 223 LBS | TEMPERATURE: 98 F | OXYGEN SATURATION: 96 % | HEART RATE: 61 BPM | DIASTOLIC BLOOD PRESSURE: 70 MMHG | SYSTOLIC BLOOD PRESSURE: 112 MMHG | HEIGHT: 68 IN

## 2019-11-08 DIAGNOSIS — K52.9 ACUTE GASTROENTERITIS: Primary | ICD-10-CM

## 2019-11-08 DIAGNOSIS — R10.84 GENERALIZED ABDOMINAL PAIN: ICD-10-CM

## 2019-11-08 DIAGNOSIS — K52.9 ACUTE GASTROENTERITIS: ICD-10-CM

## 2019-11-08 LAB
BASOPHILS ABSOLUTE: 0.03 E9/L (ref 0–0.2)
BASOPHILS RELATIVE PERCENT: 0.5 % (ref 0–2)
EOSINOPHILS ABSOLUTE: 0.12 E9/L (ref 0.05–0.5)
EOSINOPHILS RELATIVE PERCENT: 2 % (ref 0–6)
HCT VFR BLD CALC: 43.1 % (ref 34–48)
HEMOGLOBIN: 14.3 G/DL (ref 11.5–15.5)
IMMATURE GRANULOCYTES #: 0.02 E9/L
IMMATURE GRANULOCYTES %: 0.3 % (ref 0–5)
LYMPHOCYTES ABSOLUTE: 1.78 E9/L (ref 1.5–4)
LYMPHOCYTES RELATIVE PERCENT: 29.1 % (ref 20–42)
MCH RBC QN AUTO: 29.6 PG (ref 26–35)
MCHC RBC AUTO-ENTMCNC: 33.2 % (ref 32–34.5)
MCV RBC AUTO: 89.2 FL (ref 80–99.9)
MONOCYTES ABSOLUTE: 0.53 E9/L (ref 0.1–0.95)
MONOCYTES RELATIVE PERCENT: 8.7 % (ref 2–12)
NEUTROPHILS ABSOLUTE: 3.64 E9/L (ref 1.8–7.3)
NEUTROPHILS RELATIVE PERCENT: 59.4 % (ref 43–80)
PDW BLD-RTO: 12.2 FL (ref 11.5–15)
PLATELET # BLD: 188 E9/L (ref 130–450)
PMV BLD AUTO: 12.3 FL (ref 7–12)
RBC # BLD: 4.83 E12/L (ref 3.5–5.5)
WBC # BLD: 6.1 E9/L (ref 4.5–11.5)

## 2019-11-08 PROCEDURE — 85025 COMPLETE CBC W/AUTO DIFF WBC: CPT

## 2019-11-08 PROCEDURE — 36415 COLL VENOUS BLD VENIPUNCTURE: CPT

## 2019-11-08 PROCEDURE — 99213 OFFICE O/P EST LOW 20 MIN: CPT | Performed by: FAMILY MEDICINE

## 2019-11-08 RX ORDER — CIPROFLOXACIN 250 MG/1
250 TABLET, FILM COATED ORAL 2 TIMES DAILY
Qty: 14 TABLET | Refills: 0 | Status: SHIPPED | OUTPATIENT
Start: 2019-11-08 | End: 2019-11-18

## 2019-11-08 ASSESSMENT — ENCOUNTER SYMPTOMS
ABDOMINAL DISTENTION: 1
ALLERGIC/IMMUNOLOGIC NEGATIVE: 1
EYES NEGATIVE: 1
DIARRHEA: 1
RESPIRATORY NEGATIVE: 1

## 2019-11-11 ENCOUNTER — OFFICE VISIT (OUTPATIENT)
Dept: PRIMARY CARE CLINIC | Age: 59
End: 2019-11-11
Payer: COMMERCIAL

## 2019-11-11 VITALS
SYSTOLIC BLOOD PRESSURE: 120 MMHG | TEMPERATURE: 98 F | WEIGHT: 220 LBS | DIASTOLIC BLOOD PRESSURE: 80 MMHG | HEART RATE: 80 BPM | BODY MASS INDEX: 33.45 KG/M2

## 2019-11-11 DIAGNOSIS — I10 HTN (HYPERTENSION), BENIGN: ICD-10-CM

## 2019-11-11 DIAGNOSIS — E78.2 MIXED HYPERLIPIDEMIA: ICD-10-CM

## 2019-11-11 DIAGNOSIS — K52.9 GASTROENTERITIS: Primary | ICD-10-CM

## 2019-11-11 PROCEDURE — 99213 OFFICE O/P EST LOW 20 MIN: CPT | Performed by: FAMILY MEDICINE

## 2019-11-11 ASSESSMENT — ENCOUNTER SYMPTOMS
EYES NEGATIVE: 1
DIARRHEA: 1
ALLERGIC/IMMUNOLOGIC NEGATIVE: 1
NAUSEA: 1
RESPIRATORY NEGATIVE: 1

## 2019-11-30 ENCOUNTER — HOSPITAL ENCOUNTER (OUTPATIENT)
Age: 59
Discharge: HOME | End: 2019-11-30
Payer: COMMERCIAL

## 2019-11-30 DIAGNOSIS — E78.2 MIXED HYPERLIPIDEMIA: ICD-10-CM

## 2019-11-30 DIAGNOSIS — I10 HTN (HYPERTENSION), BENIGN: ICD-10-CM

## 2019-11-30 LAB
ALBUMIN SERPL-MCNC: 4.2 G/DL (ref 3.5–5.2)
ALP BLD-CCNC: 62 U/L (ref 35–104)
ALT SERPL-CCNC: 23 U/L (ref 0–32)
ANION GAP SERPL CALCULATED.3IONS-SCNC: 10 MMOL/L (ref 7–16)
AST SERPL-CCNC: 23 U/L (ref 0–31)
BASOPHILS ABSOLUTE: 0.02 E9/L (ref 0–0.2)
BASOPHILS RELATIVE PERCENT: 0.4 % (ref 0–2)
BILIRUB SERPL-MCNC: 0.9 MG/DL (ref 0–1.2)
BUN BLDV-MCNC: 18 MG/DL (ref 6–20)
CALCIUM SERPL-MCNC: 9.3 MG/DL (ref 8.6–10.2)
CHLORIDE BLD-SCNC: 103 MMOL/L (ref 98–107)
CHOLESTEROL, TOTAL: 175 MG/DL (ref 0–199)
CO2: 27 MMOL/L (ref 22–29)
CREAT SERPL-MCNC: 0.8 MG/DL (ref 0.5–1)
EOSINOPHILS ABSOLUTE: 0.15 E9/L (ref 0.05–0.5)
EOSINOPHILS RELATIVE PERCENT: 2.9 % (ref 0–6)
GFR AFRICAN AMERICAN: >60
GFR NON-AFRICAN AMERICAN: >60 ML/MIN/1.73
GLUCOSE BLD-MCNC: 96 MG/DL (ref 74–99)
HCT VFR BLD CALC: 40.9 % (ref 34–48)
HDLC SERPL-MCNC: 59 MG/DL
HEMOGLOBIN: 14.3 G/DL (ref 11.5–15.5)
IMMATURE GRANULOCYTES #: 0.01 E9/L
IMMATURE GRANULOCYTES %: 0.2 % (ref 0–5)
LDL CHOLESTEROL CALCULATED: 97 MG/DL (ref 0–99)
LYMPHOCYTES ABSOLUTE: 1.23 E9/L (ref 1.5–4)
LYMPHOCYTES RELATIVE PERCENT: 23.5 % (ref 20–42)
MCH RBC QN AUTO: 29.8 PG (ref 26–35)
MCHC RBC AUTO-ENTMCNC: 35 % (ref 32–34.5)
MCV RBC AUTO: 85.2 FL (ref 80–99.9)
MONOCYTES ABSOLUTE: 0.36 E9/L (ref 0.1–0.95)
MONOCYTES RELATIVE PERCENT: 6.9 % (ref 2–12)
NEUTROPHILS ABSOLUTE: 3.47 E9/L (ref 1.8–7.3)
NEUTROPHILS RELATIVE PERCENT: 66.1 % (ref 43–80)
PDW BLD-RTO: 11.8 FL (ref 11.5–15)
PLATELET # BLD: 153 E9/L (ref 130–450)
PMV BLD AUTO: 11.8 FL (ref 7–12)
POTASSIUM SERPL-SCNC: 4 MMOL/L (ref 3.5–5)
RBC # BLD: 4.8 E12/L (ref 3.5–5.5)
SODIUM BLD-SCNC: 140 MMOL/L (ref 132–146)
TOTAL PROTEIN: 6.9 G/DL (ref 6.4–8.3)
TRIGL SERPL-MCNC: 95 MG/DL (ref 0–149)
VLDLC SERPL CALC-MCNC: 19 MG/DL
WBC # BLD: 5.2 E9/L (ref 4.5–11.5)

## 2019-11-30 PROCEDURE — 80053 COMPREHEN METABOLIC PANEL: CPT

## 2019-11-30 PROCEDURE — 80061 LIPID PANEL: CPT

## 2019-11-30 PROCEDURE — 85025 COMPLETE CBC W/AUTO DIFF WBC: CPT

## 2019-11-30 PROCEDURE — 36415 COLL VENOUS BLD VENIPUNCTURE: CPT

## 2019-12-31 ENCOUNTER — HOSPITAL ENCOUNTER (OUTPATIENT)
Dept: MAMMOGRAPHY | Age: 59
Discharge: HOME | End: 2020-01-02
Payer: COMMERCIAL

## 2019-12-31 PROCEDURE — 77063 BREAST TOMOSYNTHESIS BI: CPT

## 2020-01-27 ENCOUNTER — OFFICE VISIT (OUTPATIENT)
Dept: PRIMARY CARE CLINIC | Age: 60
End: 2020-01-27
Payer: COMMERCIAL

## 2020-01-27 VITALS
WEIGHT: 224 LBS | BODY MASS INDEX: 34.06 KG/M2 | TEMPERATURE: 98.2 F | DIASTOLIC BLOOD PRESSURE: 80 MMHG | SYSTOLIC BLOOD PRESSURE: 130 MMHG | HEART RATE: 80 BPM

## 2020-01-27 PROCEDURE — 99213 OFFICE O/P EST LOW 20 MIN: CPT | Performed by: FAMILY MEDICINE

## 2020-01-27 RX ORDER — TRIAMTERENE AND HYDROCHLOROTHIAZIDE 37.5; 25 MG/1; MG/1
1 TABLET ORAL DAILY
Qty: 90 TABLET | Refills: 1 | Status: SHIPPED
Start: 2020-01-27 | End: 2020-09-28 | Stop reason: SDUPTHER

## 2020-01-27 RX ORDER — AZITHROMYCIN 250 MG/1
250 TABLET, FILM COATED ORAL SEE ADMIN INSTRUCTIONS
Qty: 6 TABLET | Refills: 0 | Status: SHIPPED | OUTPATIENT
Start: 2020-01-27 | End: 2020-02-01

## 2020-01-27 RX ORDER — SIMVASTATIN 10 MG
10 TABLET ORAL DAILY
Qty: 90 TABLET | Refills: 1 | Status: SHIPPED
Start: 2020-01-27 | End: 2020-09-28 | Stop reason: SDUPTHER

## 2020-01-27 RX ORDER — POTASSIUM CHLORIDE 750 MG/1
10 TABLET, FILM COATED, EXTENDED RELEASE ORAL 2 TIMES DAILY
Qty: 180 TABLET | Refills: 1 | Status: SHIPPED
Start: 2020-01-27 | End: 2020-08-21 | Stop reason: SDUPTHER

## 2020-01-27 ASSESSMENT — ENCOUNTER SYMPTOMS
COUGH: 1
EYES NEGATIVE: 1
GASTROINTESTINAL NEGATIVE: 1
ALLERGIC/IMMUNOLOGIC NEGATIVE: 1

## 2020-01-27 ASSESSMENT — PATIENT HEALTH QUESTIONNAIRE - PHQ9
2. FEELING DOWN, DEPRESSED OR HOPELESS: 0
SUM OF ALL RESPONSES TO PHQ9 QUESTIONS 1 & 2: 0
SUM OF ALL RESPONSES TO PHQ QUESTIONS 1-9: 0
1. LITTLE INTEREST OR PLEASURE IN DOING THINGS: 0
SUM OF ALL RESPONSES TO PHQ QUESTIONS 1-9: 0

## 2020-01-27 NOTE — PROGRESS NOTES
Conjunctiva/sclera: Conjunctivae normal.      Pupils: Pupils are equal, round, and reactive to light. Neck:      Musculoskeletal: Normal range of motion and neck supple. Cardiovascular:      Rate and Rhythm: Normal rate and regular rhythm. Heart sounds: Normal heart sounds. Pulmonary:      Effort: Pulmonary effort is normal.      Breath sounds: Normal breath sounds. Abdominal:      General: Bowel sounds are normal.      Palpations: Abdomen is soft. Musculoskeletal: Normal range of motion. Skin:     General: Skin is warm and dry. Neurological:      Mental Status: She is alert and oriented to person, place, and time. Psychiatric:         Thought Content: Thought content normal.            Labs :    Lab Results   Component Value Date    WBC 5.2 11/30/2019    HGB 14.3 11/30/2019    HCT 40.9 11/30/2019     11/30/2019    CHOL 175 11/30/2019    TRIG 95 11/30/2019    HDL 59 11/30/2019    ALT 23 11/30/2019    AST 23 11/30/2019     11/30/2019    K 4.0 11/30/2019     11/30/2019    CREATININE 0.8 11/30/2019    BUN 18 11/30/2019    CO2 27 11/30/2019    TSH 1.010 03/11/2017    INR 0.9 03/10/2017    GLUF 100 (H) 03/17/2019     Lab Results   Component Value Date    COLORU Yellow 06/12/2019    NITRU Negative 06/12/2019    GLUCOSEU Negative 06/12/2019    KETUA Negative 06/12/2019    UROBILINOGEN 0.2 06/12/2019    BILIRUBINUR Negative 06/12/2019     No results found for: PSA, CEA, , [id number],       Controlled Substances Monitoring:                                    ASSESSMENT     Patient Active Problem List    Diagnosis Date Noted    Back pain 03/05/2018    HTN (hypertension), benign 03/10/2017    Hyperlipemia 03/10/2017    Hypokalemia 03/10/2017        Diagnosis:     ICD-10-CM    1. Upper respiratory tract infection, unspecified type J06.9    2. Hypokalemia E87.6 potassium chloride (KLOR-CON) 10 MEQ extended release tablet   3.  Mixed hyperlipidemia E78.2 simvastatin (ZOCOR) 10 MG

## 2020-01-27 NOTE — PATIENT INSTRUCTIONS
Take the medication as prescribed  Low-sodium low-fat diet  Saltwater gargle  Force fluids  Hold the statins for 1 week  Return to clinic earlier if any problems

## 2020-02-06 ENCOUNTER — OFFICE VISIT (OUTPATIENT)
Dept: PRIMARY CARE CLINIC | Age: 60
End: 2020-02-06
Payer: COMMERCIAL

## 2020-02-06 VITALS
SYSTOLIC BLOOD PRESSURE: 120 MMHG | TEMPERATURE: 98.4 F | DIASTOLIC BLOOD PRESSURE: 70 MMHG | HEART RATE: 88 BPM | WEIGHT: 224 LBS | BODY MASS INDEX: 34.06 KG/M2

## 2020-02-06 PROCEDURE — 99214 OFFICE O/P EST MOD 30 MIN: CPT | Performed by: FAMILY MEDICINE

## 2020-02-06 ASSESSMENT — ENCOUNTER SYMPTOMS
GASTROINTESTINAL NEGATIVE: 1
ALLERGIC/IMMUNOLOGIC NEGATIVE: 1
EYES NEGATIVE: 1
COUGH: 1

## 2020-02-06 NOTE — PROGRESS NOTES
Problem List    Diagnosis Date Noted    Back pain 03/05/2018    HTN (hypertension), benign 03/10/2017    Hyperlipemia 03/10/2017    Hypokalemia 03/10/2017        Diagnosis:     ICD-10-CM    1. Upper respiratory tract infection, unspecified type J06.9 CBC WITH AUTO DIFFERENTIAL   2. Mixed hyperlipidemia E78.2 COMPREHENSIVE METABOLIC PANEL     LIPID PANEL   3. Hypokalemia E87.6 COMPREHENSIVE METABOLIC PANEL   4. HTN (hypertension), benign I10 COMPREHENSIVE METABOLIC PANEL       PLAN:   Continue present treatment  Low-sodium low-fat diet  Take potassium 3 times a day  Regular exercises  Return to clinic earlier if any problems        Patient Instructions   Continue taking medication as prescribed  Low-sodium low-fat diet  Get the lab work done  Regular exercises  Return to clinic earlier if any problems      Return in about 1 week (around 2/13/2020). Alfreda Abel reviewed my findings and recommendations with Trinity Barnes.     Electronicallysigned by Gato Lane MD on 2/6/20 at 4:26 PM

## 2020-02-08 ENCOUNTER — HOSPITAL ENCOUNTER (OUTPATIENT)
Age: 60
Discharge: HOME | End: 2020-02-08
Payer: COMMERCIAL

## 2020-02-08 LAB
ALBUMIN SERPL-MCNC: 3.9 G/DL (ref 3.5–5.2)
ALP BLD-CCNC: 63 U/L (ref 35–104)
ALT SERPL-CCNC: 22 U/L (ref 0–32)
ANION GAP SERPL CALCULATED.3IONS-SCNC: 12 MMOL/L (ref 7–16)
AST SERPL-CCNC: 31 U/L (ref 0–31)
BACTERIA: ABNORMAL /HPF
BASOPHILS ABSOLUTE: 0.04 E9/L (ref 0–0.2)
BASOPHILS RELATIVE PERCENT: 0.9 % (ref 0–2)
BILIRUB SERPL-MCNC: 1 MG/DL (ref 0–1.2)
BILIRUBIN URINE: NEGATIVE
BLOOD, URINE: ABNORMAL
BUN BLDV-MCNC: 17 MG/DL (ref 6–20)
CALCIUM SERPL-MCNC: 9.6 MG/DL (ref 8.6–10.2)
CHLORIDE BLD-SCNC: 103 MMOL/L (ref 98–107)
CHOLESTEROL, TOTAL: 153 MG/DL (ref 0–199)
CLARITY: ABNORMAL
CO2: 26 MMOL/L (ref 22–29)
COLOR: YELLOW
CREAT SERPL-MCNC: 0.8 MG/DL (ref 0.5–1)
EOSINOPHILS ABSOLUTE: 0.12 E9/L (ref 0.05–0.5)
EOSINOPHILS RELATIVE PERCENT: 2.6 % (ref 0–6)
EPITHELIAL CELLS, UA: ABNORMAL /HPF
GFR AFRICAN AMERICAN: >60
GFR NON-AFRICAN AMERICAN: >60 ML/MIN/1.73
GLUCOSE BLD-MCNC: 93 MG/DL (ref 74–99)
GLUCOSE URINE: NEGATIVE MG/DL
HCT VFR BLD CALC: 40.7 % (ref 34–48)
HDLC SERPL-MCNC: 46 MG/DL
HEMOGLOBIN: 14 G/DL (ref 11.5–15.5)
IMMATURE GRANULOCYTES #: 0.01 E9/L
IMMATURE GRANULOCYTES %: 0.2 % (ref 0–5)
KETONES, URINE: NEGATIVE MG/DL
LDL CHOLESTEROL CALCULATED: 91 MG/DL (ref 0–99)
LEUKOCYTE ESTERASE, URINE: ABNORMAL
LYMPHOCYTES ABSOLUTE: 1.42 E9/L (ref 1.5–4)
LYMPHOCYTES RELATIVE PERCENT: 31.1 % (ref 20–42)
MCH RBC QN AUTO: 29.5 PG (ref 26–35)
MCHC RBC AUTO-ENTMCNC: 34.4 % (ref 32–34.5)
MCV RBC AUTO: 85.7 FL (ref 80–99.9)
MONOCYTES ABSOLUTE: 0.34 E9/L (ref 0.1–0.95)
MONOCYTES RELATIVE PERCENT: 7.5 % (ref 2–12)
NEUTROPHILS ABSOLUTE: 2.63 E9/L (ref 1.8–7.3)
NEUTROPHILS RELATIVE PERCENT: 57.7 % (ref 43–80)
NITRITE, URINE: NEGATIVE
PDW BLD-RTO: 11.9 FL (ref 11.5–15)
PH UA: 5.5 (ref 5–9)
PLATELET # BLD: 165 E9/L (ref 130–450)
PMV BLD AUTO: 11 FL (ref 7–12)
POTASSIUM SERPL-SCNC: 4.1 MMOL/L (ref 3.5–5)
PROTEIN UA: NEGATIVE MG/DL
RBC # BLD: 4.75 E12/L (ref 3.5–5.5)
RBC UA: ABNORMAL /HPF (ref 0–2)
SODIUM BLD-SCNC: 141 MMOL/L (ref 132–146)
SPECIFIC GRAVITY UA: 1.02 (ref 1–1.03)
TOTAL PROTEIN: 6.7 G/DL (ref 6.4–8.3)
TRIGL SERPL-MCNC: 79 MG/DL (ref 0–149)
UROBILINOGEN, URINE: 0.2 E.U./DL
VLDLC SERPL CALC-MCNC: 16 MG/DL
WBC # BLD: 4.6 E9/L (ref 4.5–11.5)
WBC UA: >20 /HPF (ref 0–5)

## 2020-02-08 PROCEDURE — 36415 COLL VENOUS BLD VENIPUNCTURE: CPT

## 2020-02-08 PROCEDURE — 81001 URINALYSIS AUTO W/SCOPE: CPT

## 2020-02-08 PROCEDURE — 80053 COMPREHEN METABOLIC PANEL: CPT

## 2020-02-08 PROCEDURE — 85025 COMPLETE CBC W/AUTO DIFF WBC: CPT

## 2020-02-08 PROCEDURE — 80061 LIPID PANEL: CPT

## 2020-02-17 ENCOUNTER — OFFICE VISIT (OUTPATIENT)
Dept: PRIMARY CARE CLINIC | Age: 60
End: 2020-02-17
Payer: COMMERCIAL

## 2020-02-17 VITALS
HEART RATE: 80 BPM | SYSTOLIC BLOOD PRESSURE: 110 MMHG | TEMPERATURE: 98.4 F | DIASTOLIC BLOOD PRESSURE: 80 MMHG | WEIGHT: 224 LBS | BODY MASS INDEX: 34.06 KG/M2

## 2020-02-17 PROCEDURE — 99214 OFFICE O/P EST MOD 30 MIN: CPT | Performed by: FAMILY MEDICINE

## 2020-02-17 ASSESSMENT — ENCOUNTER SYMPTOMS
RESPIRATORY NEGATIVE: 1
GASTROINTESTINAL NEGATIVE: 1
EYES NEGATIVE: 1
ALLERGIC/IMMUNOLOGIC NEGATIVE: 1

## 2020-05-29 ENCOUNTER — TELEPHONE (OUTPATIENT)
Dept: FAMILY MEDICINE CLINIC | Age: 60
End: 2020-05-29

## 2020-05-29 NOTE — TELEPHONE ENCOUNTER
Patient called c/o leg cramps and tingling from back of thigh to calf, Rt worse than Lt.  Patient scheduled appt 6/1/20 at 10:30 am.

## 2020-06-14 ENCOUNTER — HOSPITAL ENCOUNTER (OUTPATIENT)
Age: 60
Discharge: HOME | End: 2020-06-14
Payer: COMMERCIAL

## 2020-06-14 LAB
ALBUMIN SERPL-MCNC: 4.2 G/DL (ref 3.5–5.2)
ALP BLD-CCNC: 70 U/L (ref 35–104)
ALT SERPL-CCNC: 23 U/L (ref 0–32)
ANION GAP SERPL CALCULATED.3IONS-SCNC: 8 MMOL/L (ref 7–16)
AST SERPL-CCNC: 28 U/L (ref 0–31)
BASOPHILS ABSOLUTE: 0.05 E9/L (ref 0–0.2)
BASOPHILS RELATIVE PERCENT: 1.3 % (ref 0–2)
BILIRUB SERPL-MCNC: 1.1 MG/DL (ref 0–1.2)
BUN BLDV-MCNC: 17 MG/DL (ref 6–20)
CALCIUM SERPL-MCNC: 9.5 MG/DL (ref 8.6–10.2)
CHLORIDE BLD-SCNC: 104 MMOL/L (ref 98–107)
CHOLESTEROL, TOTAL: 141 MG/DL (ref 0–199)
CO2: 31 MMOL/L (ref 22–29)
CREAT SERPL-MCNC: 0.8 MG/DL (ref 0.5–1)
EOSINOPHILS ABSOLUTE: 0.13 E9/L (ref 0.05–0.5)
EOSINOPHILS RELATIVE PERCENT: 3.3 % (ref 0–6)
GFR AFRICAN AMERICAN: >60
GFR NON-AFRICAN AMERICAN: >60 ML/MIN/1.73
GLUCOSE BLD-MCNC: 91 MG/DL (ref 74–99)
HCT VFR BLD CALC: 43 % (ref 34–48)
HDLC SERPL-MCNC: 53 MG/DL
HEMOGLOBIN: 14.7 G/DL (ref 11.5–15.5)
IMMATURE GRANULOCYTES #: 0 E9/L
IMMATURE GRANULOCYTES %: 0 % (ref 0–5)
LDL CHOLESTEROL CALCULATED: 72 MG/DL (ref 0–99)
LYMPHOCYTES ABSOLUTE: 1.26 E9/L (ref 1.5–4)
LYMPHOCYTES RELATIVE PERCENT: 32.1 % (ref 20–42)
MCH RBC QN AUTO: 29.7 PG (ref 26–35)
MCHC RBC AUTO-ENTMCNC: 34.2 % (ref 32–34.5)
MCV RBC AUTO: 86.9 FL (ref 80–99.9)
MONOCYTES ABSOLUTE: 0.36 E9/L (ref 0.1–0.95)
MONOCYTES RELATIVE PERCENT: 9.2 % (ref 2–12)
NEUTROPHILS ABSOLUTE: 2.13 E9/L (ref 1.8–7.3)
NEUTROPHILS RELATIVE PERCENT: 54.1 % (ref 43–80)
PDW BLD-RTO: 12.1 FL (ref 11.5–15)
PLATELET # BLD: 157 E9/L (ref 130–450)
PMV BLD AUTO: 11.5 FL (ref 7–12)
POTASSIUM SERPL-SCNC: 4.4 MMOL/L (ref 3.5–5)
RBC # BLD: 4.95 E12/L (ref 3.5–5.5)
SODIUM BLD-SCNC: 143 MMOL/L (ref 132–146)
TOTAL PROTEIN: 6.9 G/DL (ref 6.4–8.3)
TRIGL SERPL-MCNC: 79 MG/DL (ref 0–149)
VLDLC SERPL CALC-MCNC: 16 MG/DL
WBC # BLD: 3.9 E9/L (ref 4.5–11.5)

## 2020-06-14 PROCEDURE — 80053 COMPREHEN METABOLIC PANEL: CPT

## 2020-06-14 PROCEDURE — 36415 COLL VENOUS BLD VENIPUNCTURE: CPT

## 2020-06-14 PROCEDURE — 80061 LIPID PANEL: CPT

## 2020-06-14 PROCEDURE — 85025 COMPLETE CBC W/AUTO DIFF WBC: CPT

## 2020-06-22 ENCOUNTER — OFFICE VISIT (OUTPATIENT)
Dept: FAMILY MEDICINE CLINIC | Age: 60
End: 2020-06-22
Payer: COMMERCIAL

## 2020-06-22 VITALS
SYSTOLIC BLOOD PRESSURE: 128 MMHG | HEART RATE: 78 BPM | DIASTOLIC BLOOD PRESSURE: 82 MMHG | TEMPERATURE: 97.4 F | WEIGHT: 220 LBS | OXYGEN SATURATION: 97 % | BODY MASS INDEX: 33.45 KG/M2

## 2020-06-22 PROCEDURE — 99213 OFFICE O/P EST LOW 20 MIN: CPT | Performed by: FAMILY MEDICINE

## 2020-08-03 ENCOUNTER — HOSPITAL ENCOUNTER (OUTPATIENT)
Age: 60
Discharge: HOME | End: 2020-08-05
Payer: COMMERCIAL

## 2020-08-03 ENCOUNTER — OFFICE VISIT (OUTPATIENT)
Dept: PRIMARY CARE CLINIC | Age: 60
End: 2020-08-03
Payer: COMMERCIAL

## 2020-08-03 VITALS
OXYGEN SATURATION: 97 % | WEIGHT: 223 LBS | TEMPERATURE: 97.9 F | HEIGHT: 68 IN | BODY MASS INDEX: 33.8 KG/M2 | HEART RATE: 70 BPM

## 2020-08-03 PROCEDURE — 99213 OFFICE O/P EST LOW 20 MIN: CPT | Performed by: PHYSICIAN ASSISTANT

## 2020-08-03 PROCEDURE — U0003 INFECTIOUS AGENT DETECTION BY NUCLEIC ACID (DNA OR RNA); SEVERE ACUTE RESPIRATORY SYNDROME CORONAVIRUS 2 (SARS-COV-2) (CORONAVIRUS DISEASE [COVID-19]), AMPLIFIED PROBE TECHNIQUE, MAKING USE OF HIGH THROUGHPUT TECHNOLOGIES AS DESCRIBED BY CMS-2020-01-R: HCPCS

## 2020-08-03 RX ORDER — ONDANSETRON 4 MG/1
4 TABLET, FILM COATED ORAL 3 TIMES DAILY PRN
Qty: 15 TABLET | Refills: 0 | Status: SHIPPED
Start: 2020-08-03 | End: 2020-09-28

## 2020-08-03 NOTE — PROGRESS NOTES
02/24/2017    Excision of soft tissue neoplasm upper back    PARTIAL HYSTERECTOMY  2003     Social History:  reports that she quit smoking about 31 years ago. Her smoking use included cigarettes. She has a 4.50 pack-year smoking history. She has never used smokeless tobacco. She reports current alcohol use. She reports that she does not use drugs. Family History: family history includes Cancer in her mother; Heart Failure in her father; Hypertension in her mother; Thyroid Disease in her mother. Allergies: Penicillins    Physical Exam         VS:  Pulse 70   Temp 97.9 °F (36.6 °C) (Oral)   Ht 5' 8\" (1.727 m)   Wt 223 lb (101.2 kg)   SpO2 97%   BMI 33.91 kg/m²    Oxygen Saturation Interpretation: Normal.    General Appearance/Constitutional:  Alert, development consistent with age, NAD. HEENT:  NCAT. MMMP  Lungs: CTAB without wheezing, rales, or rhonchi. Heart:  RRR, no murmurs, rubs, or gallops. Abdomen:  General Appearance: No obvious trauma or bruising. No rashes or lesions. Bowel sounds: BS+x4       Distension:  No distension. Tenderness: Mild epigastric TTP. No guarding, rebound, or rigidity. Liver/Spleen: Non-tender and no hepatosplenomegaly. Pulsatile Mass: None noted. Back: CVA Tenderness: No bilateral tenderness or bruising. Skin:  Normal turgor. Warm, dry, without visible rash, unless noted elsewhere. Neurological:  Orientation age-appropriate. Motor functions intact. Lab / Imaging Results   (All laboratory and radiology results have been personally reviewed by myself)  Labs:  No results found for this visit on 08/03/20. Imaging: All Radiology results interpreted by Radiologist unless otherwise noted. Assessment / Plan     Impression(s):  1. Viral gastroenteritis      Disposition:  Disposition: Discharge to home. Pt advised that her illness is likely viral and should resolve with time and conservative measures.  COVID-19 swab obtained and pending, will call with results once available. Pt advised to self-quarantine until results are confirmed. Work excuse provided to patient. Script written for Zofran, side effects discussed. Increase fluids and rest. Clear liquids progressing to [employer] as tolerated. Advise virtual f/u with PCP in 3-5 days if symptoms persist. ED sooner if symptoms worsen or change. ED immediately with the development of high or refractory fever, severe or worsening abdominal pain, hematochezia, coffee-ground emesis, bloody stools, lethargy, CP, or SOB. Pt states understanding and is in agreement with this care plan. All questions answered. This visit was provided as a focused evaluation during the COVID-19 pandemic/national emergency.  A comprehensive review of all previous patient history and testing was not conducted.  Pertinent findings were elicited during the visit.

## 2020-08-03 NOTE — LETTER
[address]  Phone: [phone]  Fax: [phone]    Lamberto Ward        August 3, 2020     Patient: Honey Riddle   YOB: 1960   Date of Visit: 8/3/2020       To Whom It May Concern: It is my medical opinion that Honey Riddle should remain out of work until COVID-19 swab results are confirmed negative. If you have any questions or concerns, please don't hesitate to call. Sincerely,        Lamberto Ferguson.  JORGE Benavides

## 2020-08-05 LAB
SARS-COV-2: NOT DETECTED
SOURCE: NORMAL

## 2020-08-21 RX ORDER — POTASSIUM CHLORIDE 750 MG/1
10 TABLET, FILM COATED, EXTENDED RELEASE ORAL 2 TIMES DAILY
Qty: 180 TABLET | Refills: 1 | Status: SHIPPED
Start: 2020-08-21 | End: 2020-09-28 | Stop reason: SDUPTHER

## 2020-09-27 ENCOUNTER — HOSPITAL ENCOUNTER (OUTPATIENT)
Age: 60
Discharge: HOME | End: 2020-09-27
Payer: COMMERCIAL

## 2020-09-27 LAB
ALBUMIN SERPL-MCNC: 4.1 G/DL (ref 3.5–5.2)
ALP BLD-CCNC: 66 U/L (ref 35–104)
ALT SERPL-CCNC: 21 U/L (ref 0–32)
ANION GAP SERPL CALCULATED.3IONS-SCNC: 8 MMOL/L (ref 7–16)
AST SERPL-CCNC: 24 U/L (ref 0–31)
BASOPHILS ABSOLUTE: 0.03 E9/L (ref 0–0.2)
BASOPHILS RELATIVE PERCENT: 0.7 % (ref 0–2)
BILIRUB SERPL-MCNC: 1.1 MG/DL (ref 0–1.2)
BUN BLDV-MCNC: 15 MG/DL (ref 8–23)
CALCIUM SERPL-MCNC: 9.4 MG/DL (ref 8.6–10.2)
CHLORIDE BLD-SCNC: 103 MMOL/L (ref 98–107)
CO2: 30 MMOL/L (ref 22–29)
CREAT SERPL-MCNC: 0.8 MG/DL (ref 0.5–1)
EOSINOPHILS ABSOLUTE: 0.14 E9/L (ref 0.05–0.5)
EOSINOPHILS RELATIVE PERCENT: 3.4 % (ref 0–6)
GFR AFRICAN AMERICAN: >60
GFR NON-AFRICAN AMERICAN: >60 ML/MIN/1.73
GLUCOSE BLD-MCNC: 96 MG/DL (ref 74–99)
HCT VFR BLD CALC: 40.1 % (ref 34–48)
HEMOGLOBIN: 13.8 G/DL (ref 11.5–15.5)
IMMATURE GRANULOCYTES #: 0.01 E9/L
IMMATURE GRANULOCYTES %: 0.2 % (ref 0–5)
LYMPHOCYTES ABSOLUTE: 1.37 E9/L (ref 1.5–4)
LYMPHOCYTES RELATIVE PERCENT: 33.2 % (ref 20–42)
MCH RBC QN AUTO: 29.5 PG (ref 26–35)
MCHC RBC AUTO-ENTMCNC: 34.4 % (ref 32–34.5)
MCV RBC AUTO: 85.7 FL (ref 80–99.9)
MONOCYTES ABSOLUTE: 0.38 E9/L (ref 0.1–0.95)
MONOCYTES RELATIVE PERCENT: 9.2 % (ref 2–12)
NEUTROPHILS ABSOLUTE: 2.2 E9/L (ref 1.8–7.3)
NEUTROPHILS RELATIVE PERCENT: 53.3 % (ref 43–80)
PDW BLD-RTO: 12.2 FL (ref 11.5–15)
PLATELET # BLD: 161 E9/L (ref 130–450)
PMV BLD AUTO: 11.2 FL (ref 7–12)
POTASSIUM SERPL-SCNC: 4.1 MMOL/L (ref 3.5–5)
RBC # BLD: 4.68 E12/L (ref 3.5–5.5)
SODIUM BLD-SCNC: 141 MMOL/L (ref 132–146)
TOTAL PROTEIN: 6.6 G/DL (ref 6.4–8.3)
WBC # BLD: 4.1 E9/L (ref 4.5–11.5)

## 2020-09-27 PROCEDURE — 85025 COMPLETE CBC W/AUTO DIFF WBC: CPT

## 2020-09-27 PROCEDURE — 80053 COMPREHEN METABOLIC PANEL: CPT

## 2020-09-27 PROCEDURE — 36415 COLL VENOUS BLD VENIPUNCTURE: CPT

## 2020-09-28 ENCOUNTER — OFFICE VISIT (OUTPATIENT)
Dept: FAMILY MEDICINE CLINIC | Age: 60
End: 2020-09-28
Payer: COMMERCIAL

## 2020-09-28 ENCOUNTER — HOSPITAL ENCOUNTER (OUTPATIENT)
Age: 60
Discharge: HOME | End: 2020-09-30
Payer: COMMERCIAL

## 2020-09-28 ENCOUNTER — HOSPITAL ENCOUNTER (OUTPATIENT)
Dept: GENERAL RADIOLOGY | Age: 60
Discharge: HOME | End: 2020-09-30
Payer: COMMERCIAL

## 2020-09-28 VITALS
TEMPERATURE: 98.2 F | WEIGHT: 218 LBS | OXYGEN SATURATION: 97 % | HEART RATE: 74 BPM | DIASTOLIC BLOOD PRESSURE: 70 MMHG | SYSTOLIC BLOOD PRESSURE: 116 MMHG | BODY MASS INDEX: 33.15 KG/M2

## 2020-09-28 PROCEDURE — 99214 OFFICE O/P EST MOD 30 MIN: CPT | Performed by: FAMILY MEDICINE

## 2020-09-28 PROCEDURE — 73030 X-RAY EXAM OF SHOULDER: CPT

## 2020-09-28 RX ORDER — POTASSIUM CHLORIDE 750 MG/1
10 TABLET, FILM COATED, EXTENDED RELEASE ORAL 2 TIMES DAILY
Qty: 180 TABLET | Refills: 1 | Status: SHIPPED
Start: 2020-09-28 | End: 2021-01-18 | Stop reason: SDUPTHER

## 2020-09-28 RX ORDER — TRIAMTERENE AND HYDROCHLOROTHIAZIDE 37.5; 25 MG/1; MG/1
1 TABLET ORAL DAILY
Qty: 90 TABLET | Refills: 1 | Status: SHIPPED
Start: 2020-09-28 | End: 2021-06-07

## 2020-09-28 RX ORDER — SIMVASTATIN 10 MG
10 TABLET ORAL DAILY
Qty: 90 TABLET | Refills: 1 | Status: SHIPPED
Start: 2020-09-28 | End: 2021-06-07

## 2020-09-28 ASSESSMENT — ENCOUNTER SYMPTOMS
GASTROINTESTINAL NEGATIVE: 1
ALLERGIC/IMMUNOLOGIC NEGATIVE: 1
RESPIRATORY NEGATIVE: 1
EYES NEGATIVE: 1

## 2020-09-28 NOTE — PATIENT INSTRUCTIONS
Continue present treatment  Compresses to the left shoulder  Exercises for the shoulder  Low-sodium low-fat diet  Return to clinic earlier if any problems

## 2020-09-28 NOTE — PROGRESS NOTES
OFFICE PROGRESS NOTE      SUBJECTIVE:        Patient ID:   Ryne Costa is a 61 y.o. female who presents for   Chief Complaint   Patient presents with    Hypertension     Here for recheck on Hypertension. Patient reports feeling well. Lab work done,here for review. HPI:   Patient complaining of left shoulder pain for the last 2 weeks  Lab work is within normal limit  Blood pressure is normal  Referring to diet and exercise  Back pain is improving with the heat        Prior to Visit Medications    Medication Sig Taking? Authorizing Provider   triamterene-hydroCHLOROthiazide (MAXZIDE-25) 37.5-25 MG per tablet Take 1 tablet by mouth daily Yes Frank Soto MD   simvastatin (ZOCOR) 10 MG tablet Take 1 tablet by mouth daily Yes Frank Soto MD   potassium chloride (KLOR-CON) 10 MEQ extended release tablet Take 1 tablet by mouth 2 times daily Yes Frank Soto MD   naproxen sodium (ALEVE) 220 MG tablet Take 220 mg by mouth 2 times daily (with meals) Yes Historical Provider, MD   methocarbamol (ROBAXIN) 750 MG tablet Take 1 tablet by mouth 2 times daily as needed (for pain (may cause drowsiness)) Yes Bari Hernandez PA-C   oxyCODONE-acetaminophen (PERCOCET) 5-325 MG per tablet Take 1 tablet by mouth every 8 hours as needed for Pain.  Yes Historical Provider, MD   Coenzyme Q10 (COQ10) 200 MG CAPS Take by mouth daily Yes Historical Provider, MD   Pyridoxine HCl (VITAMIN B-6 PO) Take by mouth daily Yes Historical Provider, MD      Social History     Socioeconomic History    Marital status:      Spouse name: None    Number of children: None    Years of education: None    Highest education level: None   Occupational History    None   Social Needs    Financial resource strain: None    Food insecurity     Worry: None     Inability: None    Transportation needs     Medical: None     Non-medical: None   Tobacco Use    Smoking status: Former Smoker Packs/day: 0.30     Years: 15.00     Pack years: 4.50     Types: Cigarettes     Last attempt to quit: 1989     Years since quittin.6    Smokeless tobacco: Never Used   Substance and Sexual Activity    Alcohol use: Yes     Comment: occasional beer or wine    Drug use: No    Sexual activity: Yes     Partners: Male   Lifestyle    Physical activity     Days per week: None     Minutes per session: None    Stress: None   Relationships    Social connections     Talks on phone: None     Gets together: None     Attends Gnosticist service: None     Active member of club or organization: None     Attends meetings of clubs or organizations: None     Relationship status: None    Intimate partner violence     Fear of current or ex partner: None     Emotionally abused: None     Physically abused: None     Forced sexual activity: None   Other Topics Concern    None   Social History Narrative    None       I have reviewed Dayanna's allergies, medications, problem list, medical, social and family history and have updated as needed in the electronic medical record  Review Of Systems:    Review of Systems   Constitutional: Negative. HENT: Negative. Eyes: Negative. Respiratory: Negative. Cardiovascular: Negative. Gastrointestinal: Negative. Endocrine: Negative. Musculoskeletal: Positive for arthralgias (Left shoulder). Skin: Negative. Allergic/Immunologic: Negative. Neurological: Negative. Hematological: Negative. Psychiatric/Behavioral: Negative.                OBJECTIVE:     VS:  Wt Readings from Last 3 Encounters:   20 218 lb (98.9 kg)   20 223 lb (101.2 kg)   20 220 lb (99.8 kg)     Temp Readings from Last 3 Encounters:   20 98.2 °F (36.8 °C) (Oral)   20 97.9 °F (36.6 °C) (Oral)   20 97.4 °F (36.3 °C) (Oral)     BP Readings from Last 3 Encounters:   20 116/70   20 128/82   20 110/80        Physical Exam  Constitutional: unspecified back pain laterality  M54.5     G89.29    2. HTN (hypertension), benign  I10 triamterene-hydroCHLOROthiazide (MAXZIDE-25) 37.5-25 MG per tablet   3. Mixed hyperlipidemia  E78.2 simvastatin (ZOCOR) 10 MG tablet   4. Hypokalemia  E87.6 potassium chloride (KLOR-CON) 10 MEQ extended release tablet   5. Tendinitis of left shoulder  M75.82 XR SHOULDER LEFT 1 VW       PLAN:   Continue present treatment  Warm compresses to the left shoulder  Range of motion  Low-sodium low-fat weight reduction diet  Good exercises  Program  Return to clinic earlier if any problems        Patient Instructions   Continue present treatment  Compresses to the left shoulder  Exercises for the shoulder  Low-sodium low-fat diet  Return to clinic earlier if any problems      Return in about 2 weeks (around 10/12/2020). Ramses Hines reviewed my findings and recommendations with Yakov Petty.     Electronicallysigned by Chris Vaz MD on 9/28/20 at 4:42 PM EDT

## 2020-10-12 ENCOUNTER — OFFICE VISIT (OUTPATIENT)
Dept: FAMILY MEDICINE CLINIC | Age: 60
End: 2020-10-12
Payer: COMMERCIAL

## 2020-10-12 VITALS
BODY MASS INDEX: 33.3 KG/M2 | DIASTOLIC BLOOD PRESSURE: 70 MMHG | OXYGEN SATURATION: 98 % | WEIGHT: 219 LBS | HEART RATE: 76 BPM | TEMPERATURE: 98.4 F | SYSTOLIC BLOOD PRESSURE: 110 MMHG

## 2020-10-12 PROCEDURE — 99213 OFFICE O/P EST LOW 20 MIN: CPT | Performed by: FAMILY MEDICINE

## 2020-10-12 ASSESSMENT — ENCOUNTER SYMPTOMS
RESPIRATORY NEGATIVE: 1
EYES NEGATIVE: 1
ALLERGIC/IMMUNOLOGIC NEGATIVE: 1
GASTROINTESTINAL NEGATIVE: 1

## 2020-10-12 NOTE — PROGRESS NOTES
OFFICE PROGRESS NOTE      SUBJECTIVE:        Patient ID:   Katy Olszewski is a 61 y.o. female who presents for   Chief Complaint   Patient presents with    Shoulder Pain     Here for recheck on Lt shoulder pain. Continues to experience discomfort. Lab work done,here for review. HPI:   Patient states that her arm is lipid better than before  X-ray does show degenerative arthritis and calcified tendinitis  Patient is not exercising as instructed  Has been taking her medication as prescribed  Lab work is normal        Prior to Visit Medications    Medication Sig Taking? Authorizing Provider   triamterene-hydroCHLOROthiazide (MAXZIDE-25) 37.5-25 MG per tablet Take 1 tablet by mouth daily Yes Lottie Don MD   simvastatin (ZOCOR) 10 MG tablet Take 1 tablet by mouth daily Yes Lottie Don MD   potassium chloride (KLOR-CON) 10 MEQ extended release tablet Take 1 tablet by mouth 2 times daily Yes Lottie oDn MD   naproxen sodium (ALEVE) 220 MG tablet Take 220 mg by mouth 2 times daily (with meals) Yes Historical Provider, MD   methocarbamol (ROBAXIN) 750 MG tablet Take 1 tablet by mouth 2 times daily as needed (for pain (may cause drowsiness)) Yes Bari Hernandez PA-C   oxyCODONE-acetaminophen (PERCOCET) 5-325 MG per tablet Take 1 tablet by mouth every 8 hours as needed for Pain.  Yes Historical Provider, MD   Coenzyme Q10 (COQ10) 200 MG CAPS Take by mouth daily Yes Historical Provider, MD   Pyridoxine HCl (VITAMIN B-6 PO) Take by mouth daily Yes Historical Provider, MD      Social History     Socioeconomic History    Marital status:      Spouse name: None    Number of children: None    Years of education: None    Highest education level: None   Occupational History    None   Social Needs    Financial resource strain: None    Food insecurity     Worry: None     Inability: None    Transportation needs     Medical: None     Non-medical: None   Tobacco Use    Smoking status: Former Smoker     Packs/day: 0.30     Years: 15.00     Pack years: 4.50     Types: Cigarettes     Last attempt to quit: 1989     Years since quittin.6    Smokeless tobacco: Never Used   Substance and Sexual Activity    Alcohol use: Yes     Comment: occasional beer or wine    Drug use: No    Sexual activity: Yes     Partners: Male   Lifestyle    Physical activity     Days per week: None     Minutes per session: None    Stress: None   Relationships    Social connections     Talks on phone: None     Gets together: None     Attends Judaism service: None     Active member of club or organization: None     Attends meetings of clubs or organizations: None     Relationship status: None    Intimate partner violence     Fear of current or ex partner: None     Emotionally abused: None     Physically abused: None     Forced sexual activity: None   Other Topics Concern    None   Social History Narrative    None       I have reviewed Dayanna's allergies, medications, problem list, medical, social and family history and have updated as needed in the electronic medical record  Review Of Systems:    Review of Systems   Constitutional: Negative. HENT: Negative. Eyes: Negative. Respiratory: Negative. Cardiovascular: Negative. Gastrointestinal: Negative. Endocrine: Negative. Musculoskeletal: Positive for arthralgias. Skin: Negative. Allergic/Immunologic: Negative. Neurological: Negative. Hematological: Negative. Psychiatric/Behavioral: Negative.                OBJECTIVE:     VS:  Wt Readings from Last 3 Encounters:   10/12/20 219 lb (99.3 kg)   20 218 lb (98.9 kg)   20 223 lb (101.2 kg)     Temp Readings from Last 3 Encounters:   10/12/20 98.4 °F (36.9 °C) (Oral)   20 98.2 °F (36.8 °C) (Oral)   20 97.9 °F (36.6 °C) (Oral)     BP Readings from Last 3 Encounters:   10/12/20 110/70   20 116/70   20 128/82        Physical Exam  Constitutional:       Appearance: She is well-developed. HENT:      Head: Normocephalic and atraumatic. Eyes:      Conjunctiva/sclera: Conjunctivae normal.      Pupils: Pupils are equal, round, and reactive to light. Neck:      Musculoskeletal: Normal range of motion and neck supple. Cardiovascular:      Rate and Rhythm: Normal rate and regular rhythm. Heart sounds: Normal heart sounds. Pulmonary:      Effort: Pulmonary effort is normal.      Breath sounds: Normal breath sounds. Abdominal:      General: Bowel sounds are normal.      Palpations: Abdomen is soft. Musculoskeletal: Normal range of motion. General: Tenderness present. Skin:     General: Skin is warm and dry. Neurological:      Mental Status: She is alert and oriented to person, place, and time. Psychiatric:         Thought Content: Thought content normal.            Labs :    Lab Results   Component Value Date    WBC 4.1 (L) 09/27/2020    HGB 13.8 09/27/2020    HCT 40.1 09/27/2020     09/27/2020    CHOL 141 06/14/2020    TRIG 79 06/14/2020    HDL 53 06/14/2020    ALT 21 09/27/2020    AST 24 09/27/2020     09/27/2020    K 4.1 09/27/2020     09/27/2020    CREATININE 0.8 09/27/2020    BUN 15 09/27/2020    CO2 30 (H) 09/27/2020    TSH 1.010 03/11/2017    INR 0.9 03/10/2017    GLUF 100 (H) 03/17/2019     Lab Results   Component Value Date    COLORU Yellow 02/08/2020    NITRU Negative 02/08/2020    GLUCOSEU Negative 02/08/2020    KETUA Negative 02/08/2020    UROBILINOGEN 0.2 02/08/2020    BILIRUBINUR Negative 02/08/2020     No results found for: PSA, CEA, , OO7939,       Controlled Substances Monitoring:                                    ASSESSMENT     Patient Active Problem List    Diagnosis Date Noted    Back pain 03/05/2018    HTN (hypertension), benign 03/10/2017    Hyperlipemia 03/10/2017    Hypokalemia 03/10/2017        Diagnosis:     ICD-10-CM    1.  Tendinitis of left shoulder M77.8    2. HTN (hypertension), benign  I10    3. Mixed hyperlipidemia  E78.2    4. Arthritis  M19.90        PLAN:     Continue present treat  Low-sodium low-fat diet  Range of motion  Warm compresses to the shoulder  Return to clinic earlier if any problem      Patient Instructions   Continue present treatment  Low-sodium low-fat diet  Range of motion exercises to the shoulder  Compresses to the shoulder  Return to clinic earlier if any worse      No follow-ups on file. Peace Moore reviewed my findings and recommendations with Nely Warner.     Electronicallysigned by Kylie Meyer MD on 10/12/20 at 5:00 PM EDT

## 2020-11-19 ENCOUNTER — OFFICE VISIT (OUTPATIENT)
Dept: FAMILY MEDICINE CLINIC | Age: 60
End: 2020-11-19
Payer: COMMERCIAL

## 2020-11-19 VITALS
WEIGHT: 214 LBS | DIASTOLIC BLOOD PRESSURE: 73 MMHG | SYSTOLIC BLOOD PRESSURE: 124 MMHG | HEART RATE: 62 BPM | BODY MASS INDEX: 32.54 KG/M2 | TEMPERATURE: 94 F

## 2020-11-19 PROCEDURE — 99214 OFFICE O/P EST MOD 30 MIN: CPT | Performed by: FAMILY MEDICINE

## 2020-11-19 ASSESSMENT — ENCOUNTER SYMPTOMS
RESPIRATORY NEGATIVE: 1
ALLERGIC/IMMUNOLOGIC NEGATIVE: 1
GASTROINTESTINAL NEGATIVE: 1
EYES NEGATIVE: 1
BACK PAIN: 1

## 2020-11-19 NOTE — PATIENT INSTRUCTIONS
Continue present treatment  Low-sodium low-fat diet  Orthopedic referral to [facility] been made  Regular exercises  Continue ice and heat on the shoulder  Range of motion  Return to clinic earlier if any problems  Lab work before the next visit

## 2020-11-19 NOTE — PROGRESS NOTES
OFFICE PROGRESS NOTE      SUBJECTIVE:        Patient ID:   Rashel Huizar is a 61 y.o. female who presents for   Chief Complaint   Patient presents with    Shoulder Pain     Here for recheck on Lt shoulder pain. Continues to experince discomfort. HPI:   Patient says she is still having a lot of pain in the left shoulder  Has been trying to follow the diet and exercise  Has been using ice and heat  Has been doing the range of motion  Has been exercising        Prior to Visit Medications    Medication Sig Taking? Authorizing Provider   triamterene-hydroCHLOROthiazide (MAXZIDE-25) 37.5-25 MG per tablet Take 1 tablet by mouth daily Yes Lea Santos MD   simvastatin (ZOCOR) 10 MG tablet Take 1 tablet by mouth daily Yes Lea Santos MD   potassium chloride (KLOR-CON) 10 MEQ extended release tablet Take 1 tablet by mouth 2 times daily Yes Lea Santos MD   naproxen sodium (ALEVE) 220 MG tablet Take 220 mg by mouth 2 times daily (with meals) Yes Historical Provider, MD   methocarbamol (ROBAXIN) 750 MG tablet Take 1 tablet by mouth 2 times daily as needed (for pain (may cause drowsiness)) Yes Bari Hernandez PA-C   oxyCODONE-acetaminophen (PERCOCET) 5-325 MG per tablet Take 1 tablet by mouth every 8 hours as needed for Pain.  Yes Historical Provider, MD   Coenzyme Q10 (COQ10) 200 MG CAPS Take by mouth daily Yes Historical Provider, MD   Pyridoxine HCl (VITAMIN B-6 PO) Take by mouth daily Yes Historical Provider, MD      Social History     Socioeconomic History    Marital status:      Spouse name: None    Number of children: None    Years of education: None    Highest education level: None   Occupational History    None   Social Needs    Financial resource strain: None    Food insecurity     Worry: None     Inability: None    Transportation needs     Medical: None     Non-medical: None   Tobacco Use    Smoking status: Former Smoker     Packs/day: 0.30     Years: 15.00     Pack years: 4.50     Types: Cigarettes     Last attempt to quit: 1989     Years since quittin.8    Smokeless tobacco: Never Used   Substance and Sexual Activity    Alcohol use: Yes     Comment: occasional beer or wine    Drug use: No    Sexual activity: Yes     Partners: Male   Lifestyle    Physical activity     Days per week: None     Minutes per session: None    Stress: None   Relationships    Social connections     Talks on phone: None     Gets together: None     Attends Congregation service: None     Active member of club or organization: None     Attends meetings of clubs or organizations: None     Relationship status: None    Intimate partner violence     Fear of current or ex partner: None     Emotionally abused: None     Physically abused: None     Forced sexual activity: None   Other Topics Concern    None   Social History Narrative    None       I have reviewed Dayanna's allergies, medications, problem list, medical, social and family history and have updated as needed in the electronic medical record  Review Of Systems:    Review of Systems   Constitutional: Negative. HENT: Negative. Eyes: Negative. Respiratory: Negative. Cardiovascular: Negative. Gastrointestinal: Negative. Endocrine: Negative. Musculoskeletal: Positive for arthralgias and back pain. Skin: Negative. Allergic/Immunologic: Negative. Neurological: Negative. Hematological: Negative. Psychiatric/Behavioral: Negative.                OBJECTIVE:     VS:  Wt Readings from Last 3 Encounters:   20 214 lb (97.1 kg)   10/12/20 219 lb (99.3 kg)   20 218 lb (98.9 kg)     Temp Readings from Last 3 Encounters:   20 94 °F (34.4 °C) (Oral)   10/12/20 98.4 °F (36.9 °C) (Oral)   20 98.2 °F (36.8 °C) (Oral)     BP Readings from Last 3 Encounters:   20 124/73   10/12/20 110/70   20 116/70        Physical Exam  Constitutional:       Appearance: She is well-developed. HENT:      Head: Normocephalic and atraumatic. Eyes:      Conjunctiva/sclera: Conjunctivae normal.      Pupils: Pupils are equal, round, and reactive to light. Neck:      Musculoskeletal: Normal range of motion and neck supple. Cardiovascular:      Rate and Rhythm: Normal rate and regular rhythm. Heart sounds: Normal heart sounds. Pulmonary:      Effort: Pulmonary effort is normal.      Breath sounds: Normal breath sounds. Abdominal:      General: Bowel sounds are normal.      Palpations: Abdomen is soft. Musculoskeletal: Normal range of motion. General: Tenderness (Left shoulder) present. Skin:     General: Skin is warm and dry. Neurological:      Mental Status: She is alert and oriented to person, place, and time. Psychiatric:         Thought Content: Thought content normal.            Labs :    Lab Results   Component Value Date    WBC 4.1 (L) 09/27/2020    HGB 13.8 09/27/2020    HCT 40.1 09/27/2020     09/27/2020    CHOL 141 06/14/2020    TRIG 79 06/14/2020    HDL 53 06/14/2020    ALT 21 09/27/2020    AST 24 09/27/2020     09/27/2020    K 4.1 09/27/2020     09/27/2020    CREATININE 0.8 09/27/2020    BUN 15 09/27/2020    CO2 30 (H) 09/27/2020    TSH 1.010 03/11/2017    INR 0.9 03/10/2017    GLUF 100 (H) 03/17/2019     Lab Results   Component Value Date    COLORU Yellow 02/08/2020    NITRU Negative 02/08/2020    GLUCOSEU Negative 02/08/2020    KETUA Negative 02/08/2020    UROBILINOGEN 0.2 02/08/2020    BILIRUBINUR Negative 02/08/2020     No results found for: PSA, CEA, , [id number],       Controlled Substances Monitoring:                                    ASSESSMENT     Patient Active Problem List    Diagnosis Date Noted    Back pain 03/05/2018    HTN (hypertension), benign 03/10/2017    Hyperlipemia 03/10/2017    Hypokalemia 03/10/2017        Diagnosis:     ICD-10-CM    1. HTN (hypertension), benign  I10 CBC WITH AUTO DIFFERENTIAL   2.

## 2021-01-17 ENCOUNTER — HOSPITAL ENCOUNTER (OUTPATIENT)
Age: 61
Discharge: HOME | End: 2021-01-17
Payer: COMMERCIAL

## 2021-01-17 DIAGNOSIS — M54.50 CHRONIC LOW BACK PAIN WITHOUT SCIATICA, UNSPECIFIED BACK PAIN LATERALITY: ICD-10-CM

## 2021-01-17 DIAGNOSIS — E78.2 MIXED HYPERLIPIDEMIA: ICD-10-CM

## 2021-01-17 DIAGNOSIS — M19.90 ARTHRITIS: ICD-10-CM

## 2021-01-17 DIAGNOSIS — G89.29 CHRONIC LOW BACK PAIN WITHOUT SCIATICA, UNSPECIFIED BACK PAIN LATERALITY: ICD-10-CM

## 2021-01-17 DIAGNOSIS — M77.8 TENDINITIS OF LEFT SHOULDER: ICD-10-CM

## 2021-01-17 DIAGNOSIS — I10 HTN (HYPERTENSION), BENIGN: ICD-10-CM

## 2021-01-17 LAB
ALBUMIN SERPL-MCNC: 3.8 G/DL (ref 3.5–5.2)
ALP BLD-CCNC: 65 U/L (ref 35–104)
ALT SERPL-CCNC: 19 U/L (ref 0–32)
ANION GAP SERPL CALCULATED.3IONS-SCNC: 8 MMOL/L (ref 7–16)
AST SERPL-CCNC: 19 U/L (ref 0–31)
BASOPHILS ABSOLUTE: 0.03 E9/L (ref 0–0.2)
BASOPHILS RELATIVE PERCENT: 0.7 % (ref 0–2)
BILIRUB SERPL-MCNC: 1 MG/DL (ref 0–1.2)
BUN BLDV-MCNC: 21 MG/DL (ref 8–23)
CALCIUM SERPL-MCNC: 9.1 MG/DL (ref 8.6–10.2)
CHLORIDE BLD-SCNC: 103 MMOL/L (ref 98–107)
CHOLESTEROL, TOTAL: 141 MG/DL (ref 0–199)
CO2: 26 MMOL/L (ref 22–29)
CREAT SERPL-MCNC: 0.7 MG/DL (ref 0.5–1)
EOSINOPHILS ABSOLUTE: 0.15 E9/L (ref 0.05–0.5)
EOSINOPHILS RELATIVE PERCENT: 3.7 % (ref 0–6)
GFR AFRICAN AMERICAN: >60
GFR NON-AFRICAN AMERICAN: >60 ML/MIN/1.73
GLUCOSE BLD-MCNC: 96 MG/DL (ref 74–99)
HCT VFR BLD CALC: 38.2 % (ref 34–48)
HDLC SERPL-MCNC: 55 MG/DL
HEMOGLOBIN: 13.4 G/DL (ref 11.5–15.5)
IMMATURE GRANULOCYTES #: 0.01 E9/L
IMMATURE GRANULOCYTES %: 0.2 % (ref 0–5)
LDL CHOLESTEROL CALCULATED: 73 MG/DL (ref 0–99)
LYMPHOCYTES ABSOLUTE: 1.2 E9/L (ref 1.5–4)
LYMPHOCYTES RELATIVE PERCENT: 29.9 % (ref 20–42)
MCH RBC QN AUTO: 30 PG (ref 26–35)
MCHC RBC AUTO-ENTMCNC: 35.1 % (ref 32–34.5)
MCV RBC AUTO: 85.7 FL (ref 80–99.9)
MONOCYTES ABSOLUTE: 0.39 E9/L (ref 0.1–0.95)
MONOCYTES RELATIVE PERCENT: 9.7 % (ref 2–12)
NEUTROPHILS ABSOLUTE: 2.23 E9/L (ref 1.8–7.3)
NEUTROPHILS RELATIVE PERCENT: 55.8 % (ref 43–80)
PDW BLD-RTO: 12.2 FL (ref 11.5–15)
PLATELET # BLD: 134 E9/L (ref 130–450)
PMV BLD AUTO: 11.9 FL (ref 7–12)
POTASSIUM SERPL-SCNC: 3.9 MMOL/L (ref 3.5–5)
RBC # BLD: 4.46 E12/L (ref 3.5–5.5)
SODIUM BLD-SCNC: 137 MMOL/L (ref 132–146)
TOTAL PROTEIN: 6.1 G/DL (ref 6.4–8.3)
TRIGL SERPL-MCNC: 64 MG/DL (ref 0–149)
VLDLC SERPL CALC-MCNC: 13 MG/DL
WBC # BLD: 4 E9/L (ref 4.5–11.5)

## 2021-01-17 PROCEDURE — 36415 COLL VENOUS BLD VENIPUNCTURE: CPT

## 2021-01-17 PROCEDURE — 80061 LIPID PANEL: CPT

## 2021-01-17 PROCEDURE — 85025 COMPLETE CBC W/AUTO DIFF WBC: CPT

## 2021-01-17 PROCEDURE — 80053 COMPREHEN METABOLIC PANEL: CPT

## 2021-01-18 ENCOUNTER — OFFICE VISIT (OUTPATIENT)
Dept: FAMILY MEDICINE CLINIC | Age: 61
End: 2021-01-18
Payer: COMMERCIAL

## 2021-01-18 VITALS
OXYGEN SATURATION: 98 % | SYSTOLIC BLOOD PRESSURE: 138 MMHG | TEMPERATURE: 99 F | BODY MASS INDEX: 34.06 KG/M2 | WEIGHT: 224 LBS | DIASTOLIC BLOOD PRESSURE: 86 MMHG | HEART RATE: 91 BPM

## 2021-01-18 DIAGNOSIS — E87.6 HYPOKALEMIA: ICD-10-CM

## 2021-01-18 DIAGNOSIS — M19.90 ARTHRITIS: ICD-10-CM

## 2021-01-18 DIAGNOSIS — M54.50 CHRONIC LOW BACK PAIN WITHOUT SCIATICA, UNSPECIFIED BACK PAIN LATERALITY: ICD-10-CM

## 2021-01-18 DIAGNOSIS — I10 HTN (HYPERTENSION), BENIGN: Primary | ICD-10-CM

## 2021-01-18 DIAGNOSIS — E78.2 MIXED HYPERLIPIDEMIA: ICD-10-CM

## 2021-01-18 DIAGNOSIS — G89.29 CHRONIC LOW BACK PAIN WITHOUT SCIATICA, UNSPECIFIED BACK PAIN LATERALITY: ICD-10-CM

## 2021-01-18 PROCEDURE — 99214 OFFICE O/P EST MOD 30 MIN: CPT | Performed by: FAMILY MEDICINE

## 2021-01-18 RX ORDER — POTASSIUM CHLORIDE 750 MG/1
10 TABLET, FILM COATED, EXTENDED RELEASE ORAL 2 TIMES DAILY
Qty: 180 TABLET | Refills: 1 | Status: SHIPPED
Start: 2021-01-18 | End: 2021-08-11

## 2021-01-18 ASSESSMENT — PATIENT HEALTH QUESTIONNAIRE - PHQ9
1. LITTLE INTEREST OR PLEASURE IN DOING THINGS: 0
SUM OF ALL RESPONSES TO PHQ QUESTIONS 1-9: 0
SUM OF ALL RESPONSES TO PHQ9 QUESTIONS 1 & 2: 0

## 2021-01-18 ASSESSMENT — ENCOUNTER SYMPTOMS
BACK PAIN: 1
EYES NEGATIVE: 1
RESPIRATORY NEGATIVE: 1
ALLERGIC/IMMUNOLOGIC NEGATIVE: 1
GASTROINTESTINAL NEGATIVE: 1

## 2021-01-18 NOTE — PROGRESS NOTES
OFFICE PROGRESS NOTE      SUBJECTIVE:        Patient ID:   Addie Cristobal is a 61 y.o. female who presents for   Chief Complaint   Patient presents with    Hypertension     Here for recheck on Hypertension and Hyperlipidemia. Patient reports feeling well. Lab work done,here for review. HPI:   Patient states is feeling well  Back is getting better  Her left shoulder is better after she visit with orthopedic surgeon and a cortisone shot  Lab work is within normal limits  Since following the diet  Not been exercising      Prior to Visit Medications    Medication Sig Taking? Authorizing Provider   triamterene-hydroCHLOROthiazide (MAXZIDE-25) 37.5-25 MG per tablet Take 1 tablet by mouth daily Yes Derrek Mcmanus MD   simvastatin (ZOCOR) 10 MG tablet Take 1 tablet by mouth daily Yes Derrek Mcmanus MD   potassium chloride (KLOR-CON) 10 MEQ extended release tablet Take 1 tablet by mouth 2 times daily Yes Derrek Mcmanus MD   naproxen sodium (ALEVE) 220 MG tablet Take 220 mg by mouth 2 times daily (with meals) Yes Historical Provider, MD   methocarbamol (ROBAXIN) 750 MG tablet Take 1 tablet by mouth 2 times daily as needed (for pain (may cause drowsiness)) Yes Bari Hernandez PA-C   oxyCODONE-acetaminophen (PERCOCET) 5-325 MG per tablet Take 1 tablet by mouth every 8 hours as needed for Pain.  Yes Historical Provider, MD   Pyridoxine HCl (VITAMIN B-6 PO) Take by mouth daily Yes Historical Provider, MD   Coenzyme Q10 (COQ10) 200 MG CAPS Take by mouth daily  Historical Provider, MD      Social History     Socioeconomic History    Marital status:      Spouse name: None    Number of children: None    Years of education: None    Highest education level: None   Occupational History    None   Social Needs    Financial resource strain: None    Food insecurity     Worry: None     Inability: None    Transportation needs     Medical: None     Non-medical: None Tobacco Use    Smoking status: Former Smoker     Packs/day: 0.30     Years: 15.00     Pack years: 4.50     Types: Cigarettes     Quit date: 1989     Years since quittin.9    Smokeless tobacco: Never Used   Substance and Sexual Activity    Alcohol use: Yes     Comment: occasional beer or wine    Drug use: No    Sexual activity: Yes     Partners: Male   Lifestyle    Physical activity     Days per week: None     Minutes per session: None    Stress: None   Relationships    Social connections     Talks on phone: None     Gets together: None     Attends Hinduism service: None     Active member of club or organization: None     Attends meetings of clubs or organizations: None     Relationship status: None    Intimate partner violence     Fear of current or ex partner: None     Emotionally abused: None     Physically abused: None     Forced sexual activity: None   Other Topics Concern    None   Social History Narrative    None       I have reviewed Dayanna's allergies, medications, problem list, medical, social and family history and have updated as needed in the electronic medical record  Review Of Systems:    Review of Systems   Constitutional: Negative. HENT: Negative. Eyes: Negative. Respiratory: Negative. Cardiovascular: Negative. Gastrointestinal: Negative. Endocrine: Negative. Musculoskeletal: Positive for arthralgias (Improving) and back pain (Improving). Skin: Negative. Allergic/Immunologic: Negative. Neurological: Negative. Hematological: Negative. Psychiatric/Behavioral: Negative.                OBJECTIVE:     VS:  Wt Readings from Last 3 Encounters:   21 224 lb (101.6 kg)   20 214 lb (97.1 kg)   10/12/20 219 lb (99.3 kg)     Temp Readings from Last 3 Encounters:   21 99 °F (37.2 °C) (Oral)   20 94 °F (34.4 °C) (Oral)   10/12/20 98.4 °F (36.9 °C) (Oral)     BP Readings from Last 3 Encounters:   21 138/86   20 124/73 10/12/20 110/70        Physical Exam  Constitutional:       Appearance: She is well-developed. HENT:      Head: Normocephalic and atraumatic. Eyes:      Conjunctiva/sclera: Conjunctivae normal.      Pupils: Pupils are equal, round, and reactive to light. Neck:      Musculoskeletal: Normal range of motion and neck supple. Cardiovascular:      Rate and Rhythm: Normal rate and regular rhythm. Heart sounds: Normal heart sounds. Pulmonary:      Effort: Pulmonary effort is normal.      Breath sounds: Normal breath sounds. Abdominal:      General: Bowel sounds are normal.      Palpations: Abdomen is soft. Musculoskeletal: Normal range of motion. Skin:     General: Skin is warm and dry. Neurological:      Mental Status: She is alert and oriented to person, place, and time. Psychiatric:         Thought Content:  Thought content normal.            Labs :    Lab Results   Component Value Date    WBC 4.0 (L) 01/17/2021    HGB 13.4 01/17/2021    HCT 38.2 01/17/2021     01/17/2021    CHOL 141 01/17/2021    TRIG 64 01/17/2021    HDL 55 01/17/2021    ALT 19 01/17/2021    AST 19 01/17/2021     01/17/2021    K 3.9 01/17/2021     01/17/2021    CREATININE 0.7 01/17/2021    BUN 21 01/17/2021    CO2 26 01/17/2021    TSH 1.010 03/11/2017    INR 0.9 03/10/2017    GLUF 100 (H) 03/17/2019     Lab Results   Component Value Date    COLORU Yellow 02/08/2020    NITRU Negative 02/08/2020    GLUCOSEU Negative 02/08/2020    KETUA Negative 02/08/2020    UROBILINOGEN 0.2 02/08/2020    BILIRUBINUR Negative 02/08/2020     No results found for: PSA, CEA, , CL1157,       Controlled Substances Monitoring:                                    ASSESSMENT     Patient Active Problem List    Diagnosis Date Noted    Back pain 03/05/2018    HTN (hypertension), benign 03/10/2017    Hyperlipemia 03/10/2017    Hypokalemia 03/10/2017        Diagnosis:     ICD-10-CM 1. HTN (hypertension), benign  I10 Comprehensive Metabolic Panel   2. Hypokalemia  E87.6 potassium chloride (KLOR-CON) 10 MEQ extended release tablet     Comprehensive Metabolic Panel   3. Mixed hyperlipidemia  E78.2 Comprehensive Metabolic Panel     Lipid Panel   4. Arthritis  M19.90 CBC WITH AUTO DIFFERENTIAL   5. Chronic low back pain without sciatica, unspecified back pain laterality  M54.5     G89.29        PLAN:   Continue present treatment  Low-sodium low-fat weight reduction diet  Regular exercises  Follow with the consultants  Lab work before the next visit  Return to clinic earlier if any problems        Patient Instructions   Continue present treatment  Low-sodium low-fat weight reduction diet  Regular exercises  Follow-up with the consultants  Lab work before the next visit  Return to clinic earlier if any problems      Return in about 3 months (around 4/18/2021). Jory Holman reviewed my findings and recommendations with Jessica Gallardo.     Electronicallysigned by Doris Segal MD on 1/18/21 at 4:25 PM EST

## 2021-01-18 NOTE — PATIENT INSTRUCTIONS
Continue present treatment  Low-sodium low-fat weight reduction diet  Regular exercises  Follow-up with the consultants  Lab work before the next visit  Return to clinic earlier if any problems

## 2021-01-26 ENCOUNTER — HOSPITAL ENCOUNTER (OUTPATIENT)
Dept: ULTRASOUND IMAGING | Age: 61
Discharge: HOME | End: 2021-01-26
Payer: COMMERCIAL

## 2021-01-26 ENCOUNTER — HOSPITAL ENCOUNTER (OUTPATIENT)
Dept: MAMMOGRAPHY | Age: 61
Discharge: HOME | End: 2021-01-28
Payer: COMMERCIAL

## 2021-01-26 DIAGNOSIS — N63.10 BREAST MASS, RIGHT: ICD-10-CM

## 2021-01-26 DIAGNOSIS — R22.31 AXILLARY LUMP, RIGHT: ICD-10-CM

## 2021-01-26 DIAGNOSIS — N64.4 BREAST PAIN, LEFT: ICD-10-CM

## 2021-01-26 PROCEDURE — G0279 TOMOSYNTHESIS, MAMMO: HCPCS

## 2021-01-26 PROCEDURE — 76642 ULTRASOUND BREAST LIMITED: CPT

## 2021-04-11 ENCOUNTER — HOSPITAL ENCOUNTER (OUTPATIENT)
Age: 61
Discharge: HOME | End: 2021-04-11
Payer: COMMERCIAL

## 2021-04-11 DIAGNOSIS — E78.2 MIXED HYPERLIPIDEMIA: ICD-10-CM

## 2021-04-11 DIAGNOSIS — E87.6 HYPOKALEMIA: ICD-10-CM

## 2021-04-11 DIAGNOSIS — M19.90 ARTHRITIS: ICD-10-CM

## 2021-04-11 DIAGNOSIS — I10 HTN (HYPERTENSION), BENIGN: ICD-10-CM

## 2021-04-11 LAB
ALBUMIN SERPL-MCNC: 4 G/DL (ref 3.5–5.2)
ALP BLD-CCNC: 68 U/L (ref 35–104)
ALT SERPL-CCNC: 19 U/L (ref 0–32)
ANION GAP SERPL CALCULATED.3IONS-SCNC: 9 MMOL/L (ref 7–16)
AST SERPL-CCNC: 20 U/L (ref 0–31)
BASOPHILS ABSOLUTE: 0.03 E9/L (ref 0–0.2)
BASOPHILS RELATIVE PERCENT: 0.8 % (ref 0–2)
BILIRUB SERPL-MCNC: 0.8 MG/DL (ref 0–1.2)
BUN BLDV-MCNC: 20 MG/DL (ref 8–23)
CALCIUM SERPL-MCNC: 9.2 MG/DL (ref 8.6–10.2)
CHLORIDE BLD-SCNC: 106 MMOL/L (ref 98–107)
CHOLESTEROL, TOTAL: 138 MG/DL (ref 0–199)
CO2: 26 MMOL/L (ref 22–29)
CREAT SERPL-MCNC: 0.7 MG/DL (ref 0.5–1)
EOSINOPHILS ABSOLUTE: 0.11 E9/L (ref 0.05–0.5)
EOSINOPHILS RELATIVE PERCENT: 2.8 % (ref 0–6)
GFR AFRICAN AMERICAN: >60
GFR NON-AFRICAN AMERICAN: >60 ML/MIN/1.73
GLUCOSE BLD-MCNC: 104 MG/DL (ref 74–99)
HCT VFR BLD CALC: 39.8 % (ref 34–48)
HDLC SERPL-MCNC: 53 MG/DL
HEMOGLOBIN: 13.7 G/DL (ref 11.5–15.5)
IMMATURE GRANULOCYTES #: 0 E9/L
IMMATURE GRANULOCYTES %: 0 % (ref 0–5)
LDL CHOLESTEROL CALCULATED: 74 MG/DL (ref 0–99)
LYMPHOCYTES ABSOLUTE: 1.25 E9/L (ref 1.5–4)
LYMPHOCYTES RELATIVE PERCENT: 31.5 % (ref 20–42)
MCH RBC QN AUTO: 29.2 PG (ref 26–35)
MCHC RBC AUTO-ENTMCNC: 34.4 % (ref 32–34.5)
MCV RBC AUTO: 84.9 FL (ref 80–99.9)
MONOCYTES ABSOLUTE: 0.29 E9/L (ref 0.1–0.95)
MONOCYTES RELATIVE PERCENT: 7.3 % (ref 2–12)
NEUTROPHILS ABSOLUTE: 2.29 E9/L (ref 1.8–7.3)
NEUTROPHILS RELATIVE PERCENT: 57.6 % (ref 43–80)
PDW BLD-RTO: 11.9 FL (ref 11.5–15)
PLATELET # BLD: 154 E9/L (ref 130–450)
PMV BLD AUTO: 12.8 FL (ref 7–12)
POTASSIUM SERPL-SCNC: 3.7 MMOL/L (ref 3.5–5)
RBC # BLD: 4.69 E12/L (ref 3.5–5.5)
SODIUM BLD-SCNC: 141 MMOL/L (ref 132–146)
TOTAL PROTEIN: 6.4 G/DL (ref 6.4–8.3)
TRIGL SERPL-MCNC: 53 MG/DL (ref 0–149)
VLDLC SERPL CALC-MCNC: 11 MG/DL
WBC # BLD: 4 E9/L (ref 4.5–11.5)

## 2021-04-11 PROCEDURE — 80053 COMPREHEN METABOLIC PANEL: CPT

## 2021-04-11 PROCEDURE — 36415 COLL VENOUS BLD VENIPUNCTURE: CPT

## 2021-04-11 PROCEDURE — 85025 COMPLETE CBC W/AUTO DIFF WBC: CPT

## 2021-04-11 PROCEDURE — 80061 LIPID PANEL: CPT

## 2021-04-19 ENCOUNTER — OFFICE VISIT (OUTPATIENT)
Dept: FAMILY MEDICINE CLINIC | Age: 61
End: 2021-04-19
Payer: COMMERCIAL

## 2021-04-19 VITALS
HEART RATE: 74 BPM | WEIGHT: 223 LBS | DIASTOLIC BLOOD PRESSURE: 73 MMHG | BODY MASS INDEX: 33.91 KG/M2 | TEMPERATURE: 98.5 F | SYSTOLIC BLOOD PRESSURE: 118 MMHG | OXYGEN SATURATION: 96 %

## 2021-04-19 DIAGNOSIS — G89.29 CHRONIC LOW BACK PAIN WITHOUT SCIATICA, UNSPECIFIED BACK PAIN LATERALITY: ICD-10-CM

## 2021-04-19 DIAGNOSIS — E87.6 HYPOKALEMIA: ICD-10-CM

## 2021-04-19 DIAGNOSIS — E78.2 MIXED HYPERLIPIDEMIA: Primary | ICD-10-CM

## 2021-04-19 DIAGNOSIS — M54.50 CHRONIC LOW BACK PAIN WITHOUT SCIATICA, UNSPECIFIED BACK PAIN LATERALITY: ICD-10-CM

## 2021-04-19 DIAGNOSIS — M77.8 TENDINITIS OF LEFT SHOULDER: ICD-10-CM

## 2021-04-19 DIAGNOSIS — I10 HTN (HYPERTENSION), BENIGN: ICD-10-CM

## 2021-04-19 DIAGNOSIS — M19.90 ARTHRITIS: ICD-10-CM

## 2021-04-19 PROCEDURE — 99214 OFFICE O/P EST MOD 30 MIN: CPT | Performed by: FAMILY MEDICINE

## 2021-04-19 ASSESSMENT — ENCOUNTER SYMPTOMS
BACK PAIN: 1
ALLERGIC/IMMUNOLOGIC NEGATIVE: 1
EYES NEGATIVE: 1
GASTROINTESTINAL NEGATIVE: 1
RESPIRATORY NEGATIVE: 1

## 2021-04-19 NOTE — PROGRESS NOTES
None     Non-medical: None   Tobacco Use    Smoking status: Former Smoker     Packs/day: 0.30     Years: 15.00     Pack years: 4.50     Types: Cigarettes     Quit date: 1989     Years since quittin.2    Smokeless tobacco: Never Used   Substance and Sexual Activity    Alcohol use: Yes     Comment: occasional beer or wine    Drug use: No    Sexual activity: Yes     Partners: Male   Lifestyle    Physical activity     Days per week: None     Minutes per session: None    Stress: None   Relationships    Social connections     Talks on phone: None     Gets together: None     Attends Evangelical service: None     Active member of club or organization: None     Attends meetings of clubs or organizations: None     Relationship status: None    Intimate partner violence     Fear of current or ex partner: None     Emotionally abused: None     Physically abused: None     Forced sexual activity: None   Other Topics Concern    None   Social History Narrative    None       I have reviewed Dayanna's allergies, medications, problem list, medical, social and family history and have updated as needed in the electronic medical record  Review Of Systems:    Review of Systems   Constitutional: Negative. HENT: Negative. Eyes: Negative. Respiratory: Negative. Cardiovascular: Negative. Gastrointestinal: Negative. Endocrine: Negative. Musculoskeletal: Positive for arthralgias and back pain. Skin: Negative. Allergic/Immunologic: Negative. Neurological: Negative. Hematological: Negative. Psychiatric/Behavioral: Negative.                OBJECTIVE:     VS:  Wt Readings from Last 3 Encounters:   21 223 lb (101.2 kg)   21 224 lb (101.6 kg)   20 214 lb (97.1 kg)     Temp Readings from Last 3 Encounters:   21 98.5 °F (36.9 °C) (Oral)   21 99 °F (37.2 °C) (Oral)   20 94 °F (34.4 °C) (Oral)     BP Readings from Last 3 Encounters:   21 118/73   21 138/86 11/19/20 124/73        Physical Exam  Constitutional:       Appearance: She is well-developed. HENT:      Head: Normocephalic and atraumatic. Eyes:      Conjunctiva/sclera: Conjunctivae normal.      Pupils: Pupils are equal, round, and reactive to light. Neck:      Musculoskeletal: Normal range of motion and neck supple. Cardiovascular:      Rate and Rhythm: Normal rate and regular rhythm. Heart sounds: Normal heart sounds. Pulmonary:      Effort: Pulmonary effort is normal.      Breath sounds: Normal breath sounds. Abdominal:      General: Bowel sounds are normal.      Palpations: Abdomen is soft. Musculoskeletal: Normal range of motion. General: Tenderness (Left shoulder) present. Skin:     General: Skin is warm and dry. Neurological:      Mental Status: She is alert and oriented to person, place, and time. Psychiatric:         Thought Content:  Thought content normal.            Labs :    Lab Results   Component Value Date    WBC 4.0 (L) 04/11/2021    HGB 13.7 04/11/2021    HCT 39.8 04/11/2021     04/11/2021    CHOL 138 04/11/2021    TRIG 53 04/11/2021    HDL 53 04/11/2021    ALT 19 04/11/2021    AST 20 04/11/2021     04/11/2021    K 3.7 04/11/2021     04/11/2021    CREATININE 0.7 04/11/2021    BUN 20 04/11/2021    CO2 26 04/11/2021    TSH 1.010 03/11/2017    INR 0.9 03/10/2017    GLUF 100 (H) 03/17/2019     Lab Results   Component Value Date    COLORU Yellow 02/08/2020    NITRU Negative 02/08/2020    GLUCOSEU Negative 02/08/2020    KETUA Negative 02/08/2020    UROBILINOGEN 0.2 02/08/2020    BILIRUBINUR Negative 02/08/2020     No results found for: PSA, CEA, , [id number],       Controlled Substances Monitoring:                                    ASSESSMENT     Patient Active Problem List    Diagnosis Date Noted    Back pain 03/05/2018    HTN (hypertension), benign 03/10/2017    Hyperlipemia 03/10/2017    Hypokalemia 03/10/2017        Diagnosis: ICD-10-CM    1. Mixed hyperlipidemia  E78.2 CBC WITH AUTO DIFFERENTIAL     Comprehensive Metabolic Panel     Lipid Panel   2. HTN (hypertension), benign  I10 CBC WITH AUTO DIFFERENTIAL     Comprehensive Metabolic Panel   3. Arthritis  M19.90 CBC WITH AUTO DIFFERENTIAL   4. Hypokalemia  E87.6    5. Chronic low back pain without sciatica, unspecified back pain laterality  M54.5     G89.29    6. Tendinitis of left shoulder  M77.8 CBC WITH AUTO DIFFERENTIAL       PLAN:   Continue present treatment  Low-sodium low-fat diet  Compresses to the left shoulder  Range of motion  Follow with orthopedic surgeon  Lab work before the next visit  Return to clinic earlier if any problems        Patient Instructions   Continue present treatment  Low-sodium low-fat diet  Follow-up with the consultants  Lab work before the next visit  Return to clinic earlier if any problems      Return in about 3 months (around 7/19/2021). Zach Borja reviewed my findings and recommendations with Saman Dai.     Electronicallysigned by Valentino Branch MD on 4/19/21 at 4:29 PM EDT

## 2021-05-25 ENCOUNTER — TELEPHONE (OUTPATIENT)
Dept: FAMILY MEDICINE CLINIC | Age: 61
End: 2021-05-25

## 2021-05-25 NOTE — TELEPHONE ENCOUNTER
Patient left message stating she has rash on legs and arms. Would like to be seen. [phone]    Please advise.

## 2021-05-26 ENCOUNTER — OFFICE VISIT (OUTPATIENT)
Dept: FAMILY MEDICINE CLINIC | Age: 61
End: 2021-05-26
Payer: COMMERCIAL

## 2021-05-26 VITALS
WEIGHT: 218 LBS | OXYGEN SATURATION: 97 % | DIASTOLIC BLOOD PRESSURE: 77 MMHG | HEART RATE: 80 BPM | SYSTOLIC BLOOD PRESSURE: 122 MMHG | BODY MASS INDEX: 33.15 KG/M2 | TEMPERATURE: 97.4 F

## 2021-05-26 DIAGNOSIS — I10 HTN (HYPERTENSION), BENIGN: ICD-10-CM

## 2021-05-26 DIAGNOSIS — M19.90 ARTHRITIS: ICD-10-CM

## 2021-05-26 DIAGNOSIS — E78.2 MIXED HYPERLIPIDEMIA: Primary | ICD-10-CM

## 2021-05-26 DIAGNOSIS — M79.10 MYALGIA: ICD-10-CM

## 2021-05-26 DIAGNOSIS — M54.50 CHRONIC LOW BACK PAIN WITHOUT SCIATICA, UNSPECIFIED BACK PAIN LATERALITY: ICD-10-CM

## 2021-05-26 DIAGNOSIS — L30.9 DERMATITIS: ICD-10-CM

## 2021-05-26 DIAGNOSIS — G89.29 CHRONIC LOW BACK PAIN WITHOUT SCIATICA, UNSPECIFIED BACK PAIN LATERALITY: ICD-10-CM

## 2021-05-26 PROCEDURE — 99214 OFFICE O/P EST MOD 30 MIN: CPT | Performed by: FAMILY MEDICINE

## 2021-05-26 SDOH — ECONOMIC STABILITY: FOOD INSECURITY: WITHIN THE PAST 12 MONTHS, THE FOOD YOU BOUGHT JUST DIDN'T LAST AND YOU DIDN'T HAVE MONEY TO GET MORE.: NEVER TRUE

## 2021-05-26 ASSESSMENT — SOCIAL DETERMINANTS OF HEALTH (SDOH): HOW HARD IS IT FOR YOU TO PAY FOR THE VERY BASICS LIKE FOOD, HOUSING, MEDICAL CARE, AND HEATING?: NOT HARD AT ALL

## 2021-05-26 ASSESSMENT — ENCOUNTER SYMPTOMS
ALLERGIC/IMMUNOLOGIC NEGATIVE: 1
EYES NEGATIVE: 1
RESPIRATORY NEGATIVE: 1
GASTROINTESTINAL NEGATIVE: 1

## 2021-05-26 NOTE — PATIENT INSTRUCTIONS
Stop taking Zocor  Take coenzyme Q 10  Low-sodium low-fat diet  Use the Kenalog cream 3 times a day  Get the lab work done before the next visit  Return to clinic earlier if any problems

## 2021-05-26 NOTE — PROGRESS NOTES
OFFICE PROGRESS NOTE      SUBJECTIVE:        Patient ID:   Cooper Marina is a 61 y.o. female who presents for   Chief Complaint   Patient presents with    Rash     C/o red rash with itching on elbows and bilateral upper inner thighs x 8 days. Has been using an otc anti itch cream.    Muscle Pain     C/o cramping pain in hands and legs. States discomfort has slowly increased over past 6-8 months. HPI:   Complaining of rash on the elbows and the lower legs for the last 1 week  Has been using over-the-counter cortisone without help  Complaining of muscle spasms in the nighttime  Has been following the diet exercise  Has been taking medication as prescribed        Prior to Visit Medications    Medication Sig Taking? Authorizing Provider   potassium chloride (KLOR-CON) 10 MEQ extended release tablet Take 1 tablet by mouth 2 times daily Yes Laith Rosario MD   triamterene-hydroCHLOROthiazide (MAXZIDE-25) 37.5-25 MG per tablet Take 1 tablet by mouth daily Yes Laith Rosario MD   simvastatin (ZOCOR) 10 MG tablet Take 1 tablet by mouth daily Yes Laith Rosario MD   naproxen sodium (ALEVE) 220 MG tablet Take 220 mg by mouth 2 times daily (with meals) Yes Historical Provider, MD   methocarbamol (ROBAXIN) 750 MG tablet Take 1 tablet by mouth 2 times daily as needed (for pain (may cause drowsiness)) Yes Bari Hernandez PA-C   oxyCODONE-acetaminophen (PERCOCET) 5-325 MG per tablet Take 1 tablet by mouth every 8 hours as needed for Pain.  Yes Historical Provider, MD   Coenzyme Q10 (COQ10) 200 MG CAPS Take by mouth daily Yes Historical Provider, MD   Pyridoxine HCl (VITAMIN B-6 PO) Take by mouth daily Yes Historical Provider, MD      Social History     Socioeconomic History    Marital status:      Spouse name: None    Number of children: None    Years of education: None    Highest education level: None   Occupational History    None   Tobacco Use    Smoking status: Former Smoker     Packs/day: 0.30     Years: 15.00     Pack years: 4.50     Types: Cigarettes     Quit date: 1989     Years since quittin.3    Smokeless tobacco: Never Used   Vaping Use    Vaping Use: Never used   Substance and Sexual Activity    Alcohol use: Yes     Comment: occasional beer or wine    Drug use: No    Sexual activity: Yes     Partners: Male   Other Topics Concern    None   Social History Narrative    None     Social Determinants of Health     Financial Resource Strain: Low Risk     Difficulty of Paying Living Expenses: Not hard at all   Food Insecurity: No Food Insecurity    Worried About Running Out of Food in the Last Year: Never true    Jonathan of Food in the Last Year: Never true   Transportation Needs:     Lack of Transportation (Medical):  Lack of Transportation (Non-Medical):    Physical Activity:     Days of Exercise per Week:     Minutes of Exercise per Session:    Stress:     Feeling of Stress :    Social Connections:     Frequency of Communication with Friends and Family:     Frequency of Social Gatherings with Friends and Family:     Attends Jainism Services:     Active Member of Clubs or Organizations:     Attends Club or Organization Meetings:     Marital Status:    Intimate Partner Violence:     Fear of Current or Ex-Partner:     Emotionally Abused:     Physically Abused:     Sexually Abused:        I have reviewed Dayanna's allergies, medications, problem list, medical, social and family history and have updated as needed in the electronic medical record  Review Of Systems:    Review of Systems   Constitutional: Negative. HENT: Negative. Eyes: Negative. Respiratory: Negative. Cardiovascular: Negative. Gastrointestinal: Negative. Endocrine: Negative. Musculoskeletal: Positive for myalgias. Skin: Positive for rash. Allergic/Immunologic: Negative. Neurological: Negative. Hematological: Negative. Psychiatric/Behavioral: Negative. OBJECTIVE:     VS:  Wt Readings from Last 3 Encounters:   05/26/21 218 lb (98.9 kg)   04/19/21 223 lb (101.2 kg)   01/18/21 224 lb (101.6 kg)     Temp Readings from Last 3 Encounters:   05/26/21 97.4 °F (36.3 °C) (Infrared)   04/19/21 98.5 °F (36.9 °C) (Oral)   01/18/21 99 °F (37.2 °C) (Oral)     BP Readings from Last 3 Encounters:   05/26/21 122/77   04/19/21 118/73   01/18/21 138/86        Physical Exam  Constitutional:       Appearance: She is well-developed. HENT:      Head: Normocephalic and atraumatic. Eyes:      Conjunctiva/sclera: Conjunctivae normal.      Pupils: Pupils are equal, round, and reactive to light. Cardiovascular:      Rate and Rhythm: Normal rate and regular rhythm. Heart sounds: Normal heart sounds. Pulmonary:      Effort: Pulmonary effort is normal.      Breath sounds: Normal breath sounds. Abdominal:      General: Bowel sounds are normal.      Palpations: Abdomen is soft. Musculoskeletal:         General: Normal range of motion. Cervical back: Normal range of motion and neck supple. Skin:     General: Skin is warm and dry. Findings: Rash present. Neurological:      Mental Status: She is alert and oriented to person, place, and time. Psychiatric:         Thought Content:  Thought content normal.            Labs :    Lab Results   Component Value Date    WBC 4.0 (L) 04/11/2021    HGB 13.7 04/11/2021    HCT 39.8 04/11/2021     04/11/2021    CHOL 138 04/11/2021    TRIG 53 04/11/2021    HDL 53 04/11/2021    ALT 19 04/11/2021    AST 20 04/11/2021     04/11/2021    K 3.7 04/11/2021     04/11/2021    CREATININE 0.7 04/11/2021    BUN 20 04/11/2021    CO2 26 04/11/2021    TSH 1.010 03/11/2017    INR 0.9 03/10/2017    GLUF 100 (H) 03/17/2019     Lab Results   Component Value Date    COLORU Yellow 02/08/2020    NITRU Negative 02/08/2020    GLUCOSEU Negative 02/08/2020    KETUA Negative 02/08/2020

## 2021-06-06 ENCOUNTER — HOSPITAL ENCOUNTER (OUTPATIENT)
Age: 61
Discharge: HOME | End: 2021-06-06
Payer: COMMERCIAL

## 2021-06-06 DIAGNOSIS — L30.9 DERMATITIS: ICD-10-CM

## 2021-06-06 DIAGNOSIS — M79.10 MYALGIA: ICD-10-CM

## 2021-06-06 DIAGNOSIS — I10 HTN (HYPERTENSION), BENIGN: ICD-10-CM

## 2021-06-06 LAB
ALBUMIN SERPL-MCNC: 3.9 G/DL (ref 3.5–5.2)
ALP BLD-CCNC: 78 U/L (ref 35–104)
ALT SERPL-CCNC: 23 U/L (ref 0–32)
ANION GAP SERPL CALCULATED.3IONS-SCNC: 8 MMOL/L (ref 7–16)
AST SERPL-CCNC: 27 U/L (ref 0–31)
BASOPHILS ABSOLUTE: 0.02 E9/L (ref 0–0.2)
BASOPHILS RELATIVE PERCENT: 0.4 % (ref 0–2)
BILIRUB SERPL-MCNC: 1.3 MG/DL (ref 0–1.2)
BUN BLDV-MCNC: 14 MG/DL (ref 6–23)
CALCIUM SERPL-MCNC: 9.3 MG/DL (ref 8.6–10.2)
CHLORIDE BLD-SCNC: 103 MMOL/L (ref 98–107)
CO2: 27 MMOL/L (ref 22–29)
CREAT SERPL-MCNC: 0.8 MG/DL (ref 0.5–1)
EOSINOPHILS ABSOLUTE: 0.06 E9/L (ref 0.05–0.5)
EOSINOPHILS RELATIVE PERCENT: 1.3 % (ref 0–6)
GFR AFRICAN AMERICAN: >60
GFR NON-AFRICAN AMERICAN: >60 ML/MIN/1.73
GLUCOSE BLD-MCNC: 96 MG/DL (ref 74–99)
HCT VFR BLD CALC: 42.9 % (ref 34–48)
HEMOGLOBIN: 14.6 G/DL (ref 11.5–15.5)
IMMATURE GRANULOCYTES #: 0.01 E9/L
IMMATURE GRANULOCYTES %: 0.2 % (ref 0–5)
LYMPHOCYTES ABSOLUTE: 1.22 E9/L (ref 1.5–4)
LYMPHOCYTES RELATIVE PERCENT: 25.4 % (ref 20–42)
MCH RBC QN AUTO: 29.4 PG (ref 26–35)
MCHC RBC AUTO-ENTMCNC: 34 % (ref 32–34.5)
MCV RBC AUTO: 86.3 FL (ref 80–99.9)
MONOCYTES ABSOLUTE: 0.4 E9/L (ref 0.1–0.95)
MONOCYTES RELATIVE PERCENT: 8.3 % (ref 2–12)
NEUTROPHILS ABSOLUTE: 3.09 E9/L (ref 1.8–7.3)
NEUTROPHILS RELATIVE PERCENT: 64.4 % (ref 43–80)
PDW BLD-RTO: 12.4 FL (ref 11.5–15)
PLATELET # BLD: 174 E9/L (ref 130–450)
PMV BLD AUTO: 11.4 FL (ref 7–12)
POTASSIUM SERPL-SCNC: 3.9 MMOL/L (ref 3.5–5)
RBC # BLD: 4.97 E12/L (ref 3.5–5.5)
SODIUM BLD-SCNC: 138 MMOL/L (ref 132–146)
TOTAL CK: 81 U/L (ref 20–180)
TOTAL PROTEIN: 6.7 G/DL (ref 6.4–8.3)
WBC # BLD: 4.8 E9/L (ref 4.5–11.5)

## 2021-06-06 PROCEDURE — 80053 COMPREHEN METABOLIC PANEL: CPT

## 2021-06-06 PROCEDURE — 82550 ASSAY OF CK (CPK): CPT

## 2021-06-06 PROCEDURE — 36415 COLL VENOUS BLD VENIPUNCTURE: CPT

## 2021-06-06 PROCEDURE — 85025 COMPLETE CBC W/AUTO DIFF WBC: CPT

## 2021-06-07 ENCOUNTER — OFFICE VISIT (OUTPATIENT)
Dept: FAMILY MEDICINE CLINIC | Age: 61
End: 2021-06-07
Payer: COMMERCIAL

## 2021-06-07 VITALS
OXYGEN SATURATION: 97 % | SYSTOLIC BLOOD PRESSURE: 112 MMHG | BODY MASS INDEX: 32.99 KG/M2 | WEIGHT: 217 LBS | HEART RATE: 83 BPM | TEMPERATURE: 97.7 F | DIASTOLIC BLOOD PRESSURE: 77 MMHG

## 2021-06-07 DIAGNOSIS — I10 HTN (HYPERTENSION), BENIGN: ICD-10-CM

## 2021-06-07 DIAGNOSIS — E78.2 MIXED HYPERLIPIDEMIA: ICD-10-CM

## 2021-06-07 DIAGNOSIS — M79.10 MYALGIA: Primary | ICD-10-CM

## 2021-06-07 PROCEDURE — 99214 OFFICE O/P EST MOD 30 MIN: CPT | Performed by: FAMILY MEDICINE

## 2021-06-07 RX ORDER — TRIAMTERENE AND HYDROCHLOROTHIAZIDE 37.5; 25 MG/1; MG/1
TABLET ORAL
Qty: 90 TABLET | Refills: 0 | Status: SHIPPED
Start: 2021-06-07 | End: 2021-09-13

## 2021-06-07 RX ORDER — SIMVASTATIN 10 MG
TABLET ORAL
Qty: 90 TABLET | Refills: 0 | Status: SHIPPED
Start: 2021-06-07 | End: 2021-08-19 | Stop reason: SDUPTHER

## 2021-06-07 ASSESSMENT — ENCOUNTER SYMPTOMS
ALLERGIC/IMMUNOLOGIC NEGATIVE: 1
GASTROINTESTINAL NEGATIVE: 1
RESPIRATORY NEGATIVE: 1
EYES NEGATIVE: 1

## 2021-06-07 NOTE — PROGRESS NOTES
OFFICE PROGRESS NOTE      SUBJECTIVE:        Patient ID:   Raghav Banuelos is a 61 y.o. female who presents for   Chief Complaint   Patient presents with    Hypertension     Here for recheck on Hypertension and Hyperlipidemia. Patient reports feeling well . Continues to hold zocor. Lab work done,here for review. HPI:   Patient states she is still getting the cramps  Lab work are within normal limits  Patient is holding the statins          Prior to Visit Medications    Medication Sig Taking? Authorizing Provider   triamterene-hydroCHLOROthiazide (MAXZIDE-25) 37.5-25 MG per tablet TAKE ONE TABLET BY MOUTH DAILY Yes Gloria Otero MD   potassium chloride (KLOR-CON) 10 MEQ extended release tablet Take 1 tablet by mouth 2 times daily Yes Gloria Otero MD   naproxen sodium (ALEVE) 220 MG tablet Take 220 mg by mouth 2 times daily (with meals) Yes Historical Provider, MD   methocarbamol (ROBAXIN) 750 MG tablet Take 1 tablet by mouth 2 times daily as needed (for pain (may cause drowsiness)) Yes Bari Hernandez PA-C   oxyCODONE-acetaminophen (PERCOCET) 5-325 MG per tablet Take 1 tablet by mouth every 8 hours as needed for Pain.  Yes Historical Provider, MD   Coenzyme Q10 (COQ10) 200 MG CAPS Take by mouth daily Yes Historical Provider, MD   Pyridoxine HCl (VITAMIN B-6 PO) Take by mouth daily Yes Historical Provider, MD   simvastatin (ZOCOR) 10 MG tablet TAKE ONE TABLET BY MOUTH DAILY  Patient not taking: Reported on 2021  Gloria Otero MD      Social History     Socioeconomic History    Marital status:      Spouse name: None    Number of children: None    Years of education: None    Highest education level: None   Occupational History    None   Tobacco Use    Smoking status: Former Smoker     Packs/day: 0.30     Years: 15.00     Pack years: 4.50     Types: Cigarettes     Quit date: 1989     Years since quittin.3    Smokeless tobacco: Never Used   Vaping Use    Vaping Use: Never used   Substance and Sexual Activity    Alcohol use: Yes     Comment: occasional beer or wine    Drug use: No    Sexual activity: Yes     Partners: Male   Other Topics Concern    None   Social History Narrative    None     Social Determinants of Health     Financial Resource Strain: Low Risk     Difficulty of Paying Living Expenses: Not hard at all   Food Insecurity: No Food Insecurity    Worried About Running Out of Food in the Last Year: Never true    Jonathan of Food in the Last Year: Never true   Transportation Needs:     Lack of Transportation (Medical):  Lack of Transportation (Non-Medical):    Physical Activity:     Days of Exercise per Week:     Minutes of Exercise per Session:    Stress:     Feeling of Stress :    Social Connections:     Frequency of Communication with Friends and Family:     Frequency of Social Gatherings with Friends and Family:     Attends Oriental orthodox Services:     Active Member of Clubs or Organizations:     Attends Club or Organization Meetings:     Marital Status:    Intimate Partner Violence:     Fear of Current or Ex-Partner:     Emotionally Abused:     Physically Abused:     Sexually Abused:        I have reviewed Dayanna's allergies, medications, problem list, medical, social and family history and have updated as needed in the electronic medical record  Review Of Systems:    Review of Systems   Constitutional: Negative. HENT: Negative. Eyes: Negative. Respiratory: Negative. Cardiovascular: Negative. Gastrointestinal: Negative. Endocrine: Negative. Musculoskeletal: Positive for myalgias. Skin: Negative. Allergic/Immunologic: Negative. Neurological: Negative. Hematological: Negative. Psychiatric/Behavioral: Negative.                OBJECTIVE:     VS:  Wt Readings from Last 3 Encounters:   06/07/21 217 lb (98.4 kg)   05/26/21 218 lb (98.9 kg)   04/19/21 223 lb (101.2 kg)     Temp Readings Diagnosis Date Noted    Back pain 03/05/2018    HTN (hypertension), benign 03/10/2017    Hyperlipemia 03/10/2017    Hypokalemia 03/10/2017        Diagnosis:     ICD-10-CM    1. Myalgia  M79.10    2. Mixed hyperlipidemia  E78.2    3. HTN (hypertension), benign  I10        PLAN:   Hold the statins  Try quinine water  Low-sodium low-fat diet  Lab work before the next visit  Return to clinic earlier if any problems  Prescription medication management done        Patient Instructions   Continue present treatment  Hold the statins  Try to drink tonic water  Continue low-sodium low-fat diet  Get the lab work done before the next visit  Return to clinic earlier if any problems      Return in about 6 weeks (around 7/19/2021). Eunice Lizarraga reviewed my findings and recommendations with Charline Drummond.     Electronicallysigned by Antwon Ricci MD on 6/7/21 at 4:43 PM EDT

## 2021-06-07 NOTE — PATIENT INSTRUCTIONS
Continue present treatment  Hold the statins  Try to drink tonic water  Continue low-sodium low-fat diet  Get the lab work done before the next visit  Return to clinic earlier if any problems

## 2021-07-17 ENCOUNTER — HOSPITAL ENCOUNTER (OUTPATIENT)
Age: 61
Discharge: HOME | End: 2021-07-17
Payer: COMMERCIAL

## 2021-07-17 DIAGNOSIS — I10 HTN (HYPERTENSION), BENIGN: ICD-10-CM

## 2021-07-17 DIAGNOSIS — E78.2 MIXED HYPERLIPIDEMIA: ICD-10-CM

## 2021-07-17 DIAGNOSIS — M77.8 TENDINITIS OF LEFT SHOULDER: ICD-10-CM

## 2021-07-17 DIAGNOSIS — M19.90 ARTHRITIS: ICD-10-CM

## 2021-07-17 LAB
ALBUMIN SERPL-MCNC: 3.8 G/DL (ref 3.5–5.2)
ALP BLD-CCNC: 74 U/L (ref 35–104)
ALT SERPL-CCNC: 18 U/L (ref 0–32)
ANION GAP SERPL CALCULATED.3IONS-SCNC: 7 MMOL/L (ref 7–16)
ANISOCYTOSIS: ABNORMAL
AST SERPL-CCNC: 22 U/L (ref 0–31)
BASOPHILS ABSOLUTE: 0.05 E9/L (ref 0–0.2)
BASOPHILS RELATIVE PERCENT: 1.1 % (ref 0–2)
BILIRUB SERPL-MCNC: 1.1 MG/DL (ref 0–1.2)
BUN BLDV-MCNC: 14 MG/DL (ref 6–23)
CALCIUM SERPL-MCNC: 9.3 MG/DL (ref 8.6–10.2)
CHLORIDE BLD-SCNC: 107 MMOL/L (ref 98–107)
CHOLESTEROL, TOTAL: 177 MG/DL (ref 0–199)
CO2: 30 MMOL/L (ref 22–29)
CREAT SERPL-MCNC: 0.8 MG/DL (ref 0.5–1)
EOSINOPHILS ABSOLUTE: 0.09 E9/L (ref 0.05–0.5)
EOSINOPHILS RELATIVE PERCENT: 1.9 % (ref 0–6)
GFR AFRICAN AMERICAN: >60
GFR NON-AFRICAN AMERICAN: >60 ML/MIN/1.73
GLUCOSE BLD-MCNC: 94 MG/DL (ref 74–99)
HCT VFR BLD CALC: 41.3 % (ref 34–48)
HDLC SERPL-MCNC: 56 MG/DL
HEMOGLOBIN: 14.2 G/DL (ref 11.5–15.5)
IMMATURE GRANULOCYTES #: 0.01 E9/L
IMMATURE GRANULOCYTES %: 0.2 % (ref 0–5)
LDL CHOLESTEROL CALCULATED: 106 MG/DL (ref 0–99)
LYMPHOCYTES ABSOLUTE: 1.15 E9/L (ref 1.5–4)
LYMPHOCYTES RELATIVE PERCENT: 24.5 % (ref 20–42)
MCH RBC QN AUTO: 30.5 PG (ref 26–35)
MCHC RBC AUTO-ENTMCNC: 34.4 % (ref 32–34.5)
MCV RBC AUTO: 88.8 FL (ref 80–99.9)
MONOCYTES ABSOLUTE: 0.44 E9/L (ref 0.1–0.95)
MONOCYTES RELATIVE PERCENT: 9.4 % (ref 2–12)
NEUTROPHILS ABSOLUTE: 2.95 E9/L (ref 1.8–7.3)
NEUTROPHILS RELATIVE PERCENT: 62.9 % (ref 43–80)
PDW BLD-RTO: 12.6 FL (ref 11.5–15)
PLATELET # BLD: 139 E9/L (ref 130–450)
PMV BLD AUTO: 11.7 FL (ref 7–12)
POTASSIUM SERPL-SCNC: 4.2 MMOL/L (ref 3.5–5)
RBC # BLD: 4.65 E12/L (ref 3.5–5.5)
SODIUM BLD-SCNC: 144 MMOL/L (ref 132–146)
TOTAL PROTEIN: 6.4 G/DL (ref 6.4–8.3)
TRIGL SERPL-MCNC: 73 MG/DL (ref 0–149)
VLDLC SERPL CALC-MCNC: 15 MG/DL
WBC # BLD: 4.7 E9/L (ref 4.5–11.5)

## 2021-07-17 PROCEDURE — 80053 COMPREHEN METABOLIC PANEL: CPT

## 2021-07-17 PROCEDURE — 36415 COLL VENOUS BLD VENIPUNCTURE: CPT

## 2021-07-17 PROCEDURE — 85025 COMPLETE CBC W/AUTO DIFF WBC: CPT

## 2021-07-17 PROCEDURE — 80061 LIPID PANEL: CPT

## 2021-07-19 ENCOUNTER — OFFICE VISIT (OUTPATIENT)
Dept: FAMILY MEDICINE CLINIC | Age: 61
End: 2021-07-19
Payer: COMMERCIAL

## 2021-07-19 VITALS
WEIGHT: 224 LBS | DIASTOLIC BLOOD PRESSURE: 56 MMHG | BODY MASS INDEX: 34.06 KG/M2 | SYSTOLIC BLOOD PRESSURE: 118 MMHG | TEMPERATURE: 97.6 F | OXYGEN SATURATION: 98 % | HEART RATE: 89 BPM

## 2021-07-19 DIAGNOSIS — G89.29 CHRONIC LOW BACK PAIN WITHOUT SCIATICA, UNSPECIFIED BACK PAIN LATERALITY: ICD-10-CM

## 2021-07-19 DIAGNOSIS — M19.90 ARTHRITIS: ICD-10-CM

## 2021-07-19 DIAGNOSIS — E78.2 MIXED HYPERLIPIDEMIA: Primary | ICD-10-CM

## 2021-07-19 DIAGNOSIS — M79.10 MYALGIA: ICD-10-CM

## 2021-07-19 DIAGNOSIS — I10 HTN (HYPERTENSION), BENIGN: ICD-10-CM

## 2021-07-19 DIAGNOSIS — M54.50 CHRONIC LOW BACK PAIN WITHOUT SCIATICA, UNSPECIFIED BACK PAIN LATERALITY: ICD-10-CM

## 2021-07-19 PROCEDURE — 99214 OFFICE O/P EST MOD 30 MIN: CPT | Performed by: FAMILY MEDICINE

## 2021-07-19 ASSESSMENT — ENCOUNTER SYMPTOMS
RESPIRATORY NEGATIVE: 1
GASTROINTESTINAL NEGATIVE: 1
EYES NEGATIVE: 1
BACK PAIN: 1
ALLERGIC/IMMUNOLOGIC NEGATIVE: 1

## 2021-07-19 NOTE — PATIENT INSTRUCTIONS
Start taking Zocor  Strict low-sodium low-fat diet  Regular exercises  Lab work before the next visit  Return to clinic earlier if any problems

## 2021-07-19 NOTE — PROGRESS NOTES
Use    Vaping Use: Never used   Substance and Sexual Activity    Alcohol use: Yes     Comment: occasional beer or wine    Drug use: No    Sexual activity: Yes     Partners: Male   Other Topics Concern    None   Social History Narrative    None     Social Determinants of Health     Financial Resource Strain: Low Risk     Difficulty of Paying Living Expenses: Not hard at all   Food Insecurity: No Food Insecurity    Worried About Running Out of Food in the Last Year: Never true    Jonathan of Food in the Last Year: Never true   Transportation Needs:     Lack of Transportation (Medical):  Lack of Transportation (Non-Medical):    Physical Activity:     Days of Exercise per Week:     Minutes of Exercise per Session:    Stress:     Feeling of Stress :    Social Connections:     Frequency of Communication with Friends and Family:     Frequency of Social Gatherings with Friends and Family:     Attends [employer] Services:     Active Member of Clubs or Organizations:     Attends Club or Organization Meetings:     Marital Status:    Intimate Partner Violence:     Fear of Current or Ex-Partner:     Emotionally Abused:     Physically Abused:     Sexually Abused:        I have reviewed Dayanna's allergies, medications, problem list, medical, social and family history and have updated as needed in the electronic medical record  Review Of Systems:    Review of Systems   Constitutional: Negative. HENT: Negative. Eyes: Negative. Respiratory: Negative. Cardiovascular: Negative. Gastrointestinal: Negative. Endocrine: Negative. Musculoskeletal: Positive for arthralgias, back pain and myalgias (Improved). Skin: Negative. Allergic/Immunologic: Negative. Neurological: Negative. Hematological: Negative. Psychiatric/Behavioral: Negative.                OBJECTIVE:     VS:  Wt Readings from Last 3 Encounters:   07/19/21 224 lb (101.6 kg)   06/07/21 217 lb (98.4 kg)   05/26/21 218 lb (98.9 kg)     Temp Readings from Last 3 Encounters:   07/19/21 97.6 °F (36.4 °C) (Infrared)   06/07/21 97.7 °F (36.5 °C) (Infrared)   05/26/21 97.4 °F (36.3 °C) (Infrared)     BP Readings from Last 3 Encounters:   07/19/21 (!) 118/56   06/07/21 112/77   05/26/21 122/77        Physical Exam  Constitutional:       Appearance: She is well-developed. HENT:      Head: Normocephalic and atraumatic. Eyes:      Conjunctiva/sclera: Conjunctivae normal.      Pupils: Pupils are equal, round, and reactive to light. Cardiovascular:      Rate and Rhythm: Normal rate and regular rhythm. Heart sounds: Normal heart sounds. Pulmonary:      Effort: Pulmonary effort is normal.      Breath sounds: Normal breath sounds. Abdominal:      General: Bowel sounds are normal.      Palpations: Abdomen is soft. Musculoskeletal:         General: Normal range of motion. Cervical back: Normal range of motion and neck supple. Skin:     General: Skin is warm and dry. Neurological:      Mental Status: She is alert and oriented to person, place, and time. Psychiatric:         Thought Content:  Thought content normal.            Labs :    Lab Results   Component Value Date    WBC 4.7 07/17/2021    HGB 14.2 07/17/2021    HCT 41.3 07/17/2021     07/17/2021    CHOL 177 07/17/2021    TRIG 73 07/17/2021    HDL 56 07/17/2021    ALT 18 07/17/2021    AST 22 07/17/2021     07/17/2021    K 4.2 07/17/2021     07/17/2021    CREATININE 0.8 07/17/2021    BUN 14 07/17/2021    CO2 30 (H) 07/17/2021    TSH 1.010 03/11/2017    INR 0.9 03/10/2017    GLUF 100 (H) 03/17/2019     Lab Results   Component Value Date    COLORU Yellow 02/08/2020    NITRU Negative 02/08/2020    GLUCOSEU Negative 02/08/2020    KETUA Negative 02/08/2020    UROBILINOGEN 0.2 02/08/2020    BILIRUBINUR Negative 02/08/2020     No results found for: PSA, CEA, , SE7310,       Controlled Substances Monitoring: ASSESSMENT     Patient Active Problem List    Diagnosis Date Noted    Back pain 03/05/2018    HTN (hypertension), benign 03/10/2017    Hyperlipemia 03/10/2017    Hypokalemia 03/10/2017        Diagnosis:     ICD-10-CM    1. Mixed hyperlipidemia  E78.2 CBC WITH AUTO DIFFERENTIAL     COMPREHENSIVE METABOLIC PANEL     LIPID PANEL   2. HTN (hypertension), benign  I10 CBC WITH AUTO DIFFERENTIAL     COMPREHENSIVE METABOLIC PANEL   3. Chronic low back pain without sciatica, unspecified back pain laterality  M54.5     G89.29    4. Myalgia  M79.10    5. Arthritis  M19.90        PLAN:   Continue present treatment  Start taking Zocor  Strict low-sodium low-fat diet  Regular exercises  Return to clinic earlier if any problems        Patient Instructions   Start taking Zocor  Strict low-sodium low-fat diet  Regular exercises  Lab work before the next visit  Return to clinic earlier if any problems      Return in about 6 weeks (around 8/30/2021). Stephy Rushing reviewed my findings and recommendations with Hector Salinas.     Electronicallysigned by Nasreen Helton MD on 7/19/21 at 4:30 PM EDT

## 2021-08-11 DIAGNOSIS — E87.6 HYPOKALEMIA: ICD-10-CM

## 2021-08-11 RX ORDER — POTASSIUM CHLORIDE 750 MG/1
TABLET, FILM COATED, EXTENDED RELEASE ORAL
Qty: 180 TABLET | Refills: 0 | Status: SHIPPED
Start: 2021-08-11 | End: 2021-11-15

## 2021-08-19 DIAGNOSIS — E78.2 MIXED HYPERLIPIDEMIA: ICD-10-CM

## 2021-08-23 RX ORDER — SIMVASTATIN 10 MG
10 TABLET ORAL NIGHTLY
Qty: 90 TABLET | Refills: 0 | Status: SHIPPED
Start: 2021-08-23 | End: 2022-02-11

## 2021-09-13 DIAGNOSIS — I10 HTN (HYPERTENSION), BENIGN: ICD-10-CM

## 2021-09-13 RX ORDER — TRIAMTERENE AND HYDROCHLOROTHIAZIDE 37.5; 25 MG/1; MG/1
TABLET ORAL
Qty: 90 TABLET | Refills: 0 | Status: SHIPPED
Start: 2021-09-13 | End: 2021-12-13

## 2021-09-25 ENCOUNTER — HOSPITAL ENCOUNTER (OUTPATIENT)
Age: 61
Discharge: HOME | End: 2021-09-25
Payer: COMMERCIAL

## 2021-09-25 DIAGNOSIS — I10 HTN (HYPERTENSION), BENIGN: ICD-10-CM

## 2021-09-25 DIAGNOSIS — E78.2 MIXED HYPERLIPIDEMIA: ICD-10-CM

## 2021-09-25 LAB
ALBUMIN SERPL-MCNC: 4.1 G/DL (ref 3.5–5.2)
ALP BLD-CCNC: 71 U/L (ref 35–104)
ALT SERPL-CCNC: 21 U/L (ref 0–32)
ANION GAP SERPL CALCULATED.3IONS-SCNC: 9 MMOL/L (ref 7–16)
AST SERPL-CCNC: 28 U/L (ref 0–31)
BASOPHILS ABSOLUTE: 0.02 E9/L (ref 0–0.2)
BASOPHILS RELATIVE PERCENT: 0.4 % (ref 0–2)
BILIRUB SERPL-MCNC: 1.2 MG/DL (ref 0–1.2)
BUN BLDV-MCNC: 16 MG/DL (ref 6–23)
CALCIUM SERPL-MCNC: 9.2 MG/DL (ref 8.6–10.2)
CHLORIDE BLD-SCNC: 102 MMOL/L (ref 98–107)
CHOLESTEROL, TOTAL: 138 MG/DL (ref 0–199)
CO2: 28 MMOL/L (ref 22–29)
CREAT SERPL-MCNC: 0.8 MG/DL (ref 0.5–1)
EOSINOPHILS ABSOLUTE: 0.13 E9/L (ref 0.05–0.5)
EOSINOPHILS RELATIVE PERCENT: 2.8 % (ref 0–6)
GFR AFRICAN AMERICAN: >60
GFR NON-AFRICAN AMERICAN: >60 ML/MIN/1.73
GLUCOSE BLD-MCNC: 84 MG/DL (ref 74–99)
HCT VFR BLD CALC: 42.3 % (ref 34–48)
HDLC SERPL-MCNC: 52 MG/DL
HEMOGLOBIN: 14.4 G/DL (ref 11.5–15.5)
IMMATURE GRANULOCYTES #: 0.01 E9/L
IMMATURE GRANULOCYTES %: 0.2 % (ref 0–5)
LDL CHOLESTEROL CALCULATED: 70 MG/DL (ref 0–99)
LYMPHOCYTES ABSOLUTE: 1.11 E9/L (ref 1.5–4)
LYMPHOCYTES RELATIVE PERCENT: 23.5 % (ref 20–42)
MCH RBC QN AUTO: 29.6 PG (ref 26–35)
MCHC RBC AUTO-ENTMCNC: 34 % (ref 32–34.5)
MCV RBC AUTO: 87 FL (ref 80–99.9)
MONOCYTES ABSOLUTE: 0.48 E9/L (ref 0.1–0.95)
MONOCYTES RELATIVE PERCENT: 10.2 % (ref 2–12)
NEUTROPHILS ABSOLUTE: 2.97 E9/L (ref 1.8–7.3)
NEUTROPHILS RELATIVE PERCENT: 62.9 % (ref 43–80)
PDW BLD-RTO: 11.9 FL (ref 11.5–15)
PLATELET # BLD: 149 E9/L (ref 130–450)
PMV BLD AUTO: 12.4 FL (ref 7–12)
POTASSIUM SERPL-SCNC: 4 MMOL/L (ref 3.5–5)
RBC # BLD: 4.86 E12/L (ref 3.5–5.5)
SODIUM BLD-SCNC: 139 MMOL/L (ref 132–146)
TOTAL PROTEIN: 6.5 G/DL (ref 6.4–8.3)
TRIGL SERPL-MCNC: 78 MG/DL (ref 0–149)
VLDLC SERPL CALC-MCNC: 16 MG/DL
WBC # BLD: 4.7 E9/L (ref 4.5–11.5)

## 2021-09-25 PROCEDURE — 36415 COLL VENOUS BLD VENIPUNCTURE: CPT

## 2021-09-25 PROCEDURE — 85025 COMPLETE CBC W/AUTO DIFF WBC: CPT

## 2021-09-25 PROCEDURE — 80053 COMPREHEN METABOLIC PANEL: CPT

## 2021-09-25 PROCEDURE — 80061 LIPID PANEL: CPT

## 2021-09-27 ENCOUNTER — OFFICE VISIT (OUTPATIENT)
Dept: FAMILY MEDICINE CLINIC | Age: 61
End: 2021-09-27
Payer: COMMERCIAL

## 2021-09-27 VITALS
TEMPERATURE: 97.8 F | DIASTOLIC BLOOD PRESSURE: 80 MMHG | BODY MASS INDEX: 34.69 KG/M2 | HEART RATE: 84 BPM | OXYGEN SATURATION: 96 % | SYSTOLIC BLOOD PRESSURE: 133 MMHG | HEIGHT: 67 IN | WEIGHT: 221 LBS

## 2021-09-27 DIAGNOSIS — I10 HTN (HYPERTENSION), BENIGN: Primary | ICD-10-CM

## 2021-09-27 DIAGNOSIS — M79.10 MYALGIA: ICD-10-CM

## 2021-09-27 DIAGNOSIS — E78.2 MIXED HYPERLIPIDEMIA: ICD-10-CM

## 2021-09-27 DIAGNOSIS — E87.6 HYPOKALEMIA: ICD-10-CM

## 2021-09-27 PROCEDURE — 99214 OFFICE O/P EST MOD 30 MIN: CPT | Performed by: FAMILY MEDICINE

## 2021-09-27 ASSESSMENT — ENCOUNTER SYMPTOMS
RESPIRATORY NEGATIVE: 1
EYES NEGATIVE: 1
GASTROINTESTINAL NEGATIVE: 1
ALLERGIC/IMMUNOLOGIC NEGATIVE: 1

## 2021-09-27 NOTE — PROGRESS NOTES
OFFICE PROGRESS NOTE      SUBJECTIVE:        Patient ID:   Gilda Ricardo is a 64 y.o. female who presents for   Chief Complaint   Patient presents with    Hypertension     Here forecheck on Hypertension and Hyperlipidemia. Patient reports feeling well without any complaints. Lab work done,here for review. HPI:   Patient states is feeling better  Lab work is within normal limits  Her myalgia is better  Patient states is time to follow the diet and exercise  Has been taking her medication as prescribed        Prior to Visit Medications    Medication Sig Taking? Authorizing Provider   triamterene-hydroCHLOROthiazide (MAXZIDE-25) 37.5-25 MG per tablet TAKE ONE TABLET BY MOUTH DAILY Yes Sarah Calderon MD   simvastatin (ZOCOR) 10 MG tablet Take 1 tablet by mouth nightly Yes Sarah Calderon MD   potassium chloride (KLOR-CON) 10 MEQ extended release tablet TAKE ONE TABLET BY MOUTH TWO TIMES A DAY Yes Sarah Calderon MD   naproxen sodium (ALEVE) 220 MG tablet Take 220 mg by mouth 2 times daily (with meals) Yes Historical Provider, MD   methocarbamol (ROBAXIN) 750 MG tablet Take 1 tablet by mouth 2 times daily as needed (for pain (may cause drowsiness)) Yes Bari Hernandez PA-C   oxyCODONE-acetaminophen (PERCOCET) 5-325 MG per tablet Take 1 tablet by mouth every 8 hours as needed for Pain.  Yes Historical Provider, MD   Coenzyme Q10 (COQ10) 200 MG CAPS Take by mouth daily Yes Historical Provider, MD   Pyridoxine HCl (VITAMIN B-6 PO) Take by mouth daily Yes Historical Provider, MD      Social History     Socioeconomic History    Marital status:      Spouse name: None    Number of children: None    Years of education: None    Highest education level: None   Occupational History    None   Tobacco Use    Smoking status: Former Smoker     Packs/day: 0.30     Years: 15.00     Pack years: 4.50     Types: Cigarettes     Quit date: 2/7/1989     Years since quitting: 32.6    Smokeless tobacco: Never Used   Vaping Use    Vaping Use: Never used   Substance and Sexual Activity    Alcohol use: Yes     Comment: occasional beer or wine    Drug use: No    Sexual activity: Yes     Partners: Male   Other Topics Concern    None   Social History Narrative    None     Social Determinants of Health     Financial Resource Strain: Low Risk     Difficulty of Paying Living Expenses: Not hard at all   Food Insecurity: No Food Insecurity    Worried About Running Out of Food in the Last Year: Never true    Jonathan of Food in the Last Year: Never true   Transportation Needs:     Lack of Transportation (Medical):  Lack of Transportation (Non-Medical):    Physical Activity:     Days of Exercise per Week:     Minutes of Exercise per Session:    Stress:     Feeling of Stress :    Social Connections:     Frequency of Communication with Friends and Family:     Frequency of Social Gatherings with Friends and Family:     Attends Shinto Services:     Active Member of Clubs or Organizations:     Attends Club or Organization Meetings:     Marital Status:    Intimate Partner Violence:     Fear of Current or Ex-Partner:     Emotionally Abused:     Physically Abused:     Sexually Abused:        I have reviewed Dayanna's allergies, medications, problem list, medical, social and family history and have updated as needed in the electronic medical record  Review Of Systems:    Review of Systems   Constitutional: Negative. HENT: Negative. Eyes: Negative. Respiratory: Negative. Cardiovascular: Negative. Gastrointestinal: Negative. Endocrine: Negative. Musculoskeletal: Positive for myalgias (Improved). Skin: Negative. Allergic/Immunologic: Negative. Neurological: Negative. Hematological: Negative. Psychiatric/Behavioral: Negative.                OBJECTIVE:     VS:  Wt Readings from Last 3 Encounters:   09/27/21 221 lb (100.2 kg)   07/19/21 224 lb (101.6 kg) 06/07/21 217 lb (98.4 kg)     Temp Readings from Last 3 Encounters:   09/27/21 97.8 °F (36.6 °C) (Infrared)   07/19/21 97.6 °F (36.4 °C) (Infrared)   06/07/21 97.7 °F (36.5 °C) (Infrared)     BP Readings from Last 3 Encounters:   09/27/21 133/80   07/19/21 (!) 118/56   06/07/21 112/77        Physical Exam  Constitutional:       Appearance: She is well-developed. HENT:      Head: Normocephalic and atraumatic. Eyes:      Conjunctiva/sclera: Conjunctivae normal.      Pupils: Pupils are equal, round, and reactive to light. Cardiovascular:      Rate and Rhythm: Normal rate and regular rhythm. Heart sounds: Normal heart sounds. Pulmonary:      Effort: Pulmonary effort is normal.      Breath sounds: Normal breath sounds. Abdominal:      General: Bowel sounds are normal.      Palpations: Abdomen is soft. Musculoskeletal:         General: Normal range of motion. Cervical back: Normal range of motion and neck supple. Skin:     General: Skin is warm and dry. Neurological:      Mental Status: She is alert and oriented to person, place, and time. Psychiatric:         Thought Content:  Thought content normal.            Labs :    Lab Results   Component Value Date    WBC 4.7 09/25/2021    HGB 14.4 09/25/2021    HCT 42.3 09/25/2021     09/25/2021    CHOL 138 09/25/2021    TRIG 78 09/25/2021    HDL 52 09/25/2021    ALT 21 09/25/2021    AST 28 09/25/2021     09/25/2021    K 4.0 09/25/2021     09/25/2021    CREATININE 0.8 09/25/2021    BUN 16 09/25/2021    CO2 28 09/25/2021    TSH 1.010 03/11/2017    INR 0.9 03/10/2017    GLUF 100 (H) 03/17/2019     Lab Results   Component Value Date    COLORU Yellow 02/08/2020    NITRU Negative 02/08/2020    GLUCOSEU Negative 02/08/2020    KETUA Negative 02/08/2020    UROBILINOGEN 0.2 02/08/2020    BILIRUBINUR Negative 02/08/2020     No results found for: PSA, CEA, , HT8203,       Controlled Substances Monitoring: ASSESSMENT     Patient Active Problem List    Diagnosis Date Noted    Back pain 03/05/2018    HTN (hypertension), benign 03/10/2017    Hyperlipemia 03/10/2017    Hypokalemia 03/10/2017        Diagnosis:     ICD-10-CM    1. HTN (hypertension), benign  I10 CBC WITH AUTO DIFFERENTIAL   2. Mixed hyperlipidemia  E78.2 CBC WITH AUTO DIFFERENTIAL     Comprehensive Metabolic Panel     Lipid Panel   3. Hypokalemia  E87.6 CBC WITH AUTO DIFFERENTIAL     Comprehensive Metabolic Panel   4. Myalgia  M79.10 CBC WITH AUTO DIFFERENTIAL       PLAN:   Continue present treatment  Low-sodium low-fat diet  Regular exercises  Lab work in 3 months  Return to clinic earlier if any problem         Patient Instructions   Continue present treatment  Strict low-sodium low-fat diet  Regular exercises  Lab work in 3 months  Return to clinic after the lab work  Return to clinic earlier if any problems      Return in about 3 months (around 12/27/2021). Gabriela Tidwell reviewed my findings and recommendations with Ubaldo Tesfaye.     Electronicallysigned by Alana Beltran MD on 9/27/21 at 4:33 PM EDT

## 2021-09-27 NOTE — PATIENT INSTRUCTIONS
Continue present treatment  Strict low-sodium low-fat diet  Regular exercises  Lab work in 3 months  Return to clinic after the lab work  Return to clinic earlier if any problems

## 2021-10-01 ENCOUNTER — TELEPHONE (OUTPATIENT)
Dept: FAMILY MEDICINE CLINIC | Age: 61
End: 2021-10-01

## 2021-10-01 NOTE — TELEPHONE ENCOUNTER
----- Message from Warren Arias sent at 9/20/2021 12:28 PM EDT -----  Subject: Appointment Request    Reason for Call: Routine Existing Condition Follow Up    QUESTIONS  Type of Appointment? Established Patient  Reason for appointment request? Available appointments did not meet   patient need  Additional Information for Provider? Spoke with patient and she is needing   an appointment time of 4:15 pm on any given day.  does not have any time   slot that late at this time - scanned into early Nov and no late   appointment times are coming up. Please return patient's call to assist.   TY   ---------------------------------------------------------------------------  --------------  CALL BACK INFO  What is the best way for the office to contact you? OK to leave message on   voicemail  Preferred Call Back Phone Number? [phone]  ---------------------------------------------------------------------------  --------------  SCRIPT ANSWERS  Relationship to Patient? Self  (Is the patient requesting to be seen urgently for their symptoms?)? No  Is this follow up request related to routine Diabetes Management? No  Are you having any new concerns about your existing condition? No  Have you been diagnosed with, awaiting test results for, or told that you   are suspected of having COVID-19 (Coronavirus)? (If patient has tested   negative or was tested as a requirement for work, school, or travel and   not based on symptoms, answer no)? No  Within the past two weeks have you developed any of the following symptoms   (answer no if symptoms have been present longer than 2 weeks or began   more than 2 weeks ago)? Fever or Chills, Cough, Shortness of breath or   difficulty breathing, Loss of taste or smell, Sore throat, Nasal   congestion, Sneezing or runny nose, Fatigue or generalized body aches   (answer no if pain is specific to a body part e.g. back pain), Diarrhea,   Headache?  No  Have you had close contact with someone with COVID-19 in the last 14 days? No  (Service Expert - click yes below to proceed with [employer] As Usual   Scheduling)?  Yes

## 2021-11-14 DIAGNOSIS — E87.6 HYPOKALEMIA: ICD-10-CM

## 2021-11-15 RX ORDER — POTASSIUM CHLORIDE 750 MG/1
TABLET, FILM COATED, EXTENDED RELEASE ORAL
Qty: 180 TABLET | Refills: 0 | Status: SHIPPED
Start: 2021-11-15 | End: 2022-02-11

## 2021-12-13 DIAGNOSIS — I10 HTN (HYPERTENSION), BENIGN: ICD-10-CM

## 2021-12-13 RX ORDER — TRIAMTERENE AND HYDROCHLOROTHIAZIDE 37.5; 25 MG/1; MG/1
TABLET ORAL
Qty: 90 TABLET | Refills: 0 | Status: SHIPPED
Start: 2021-12-13 | End: 2022-03-14

## 2021-12-22 DIAGNOSIS — M79.10 MYALGIA: ICD-10-CM

## 2021-12-22 DIAGNOSIS — E78.2 MIXED HYPERLIPIDEMIA: ICD-10-CM

## 2021-12-22 DIAGNOSIS — I10 HTN (HYPERTENSION), BENIGN: ICD-10-CM

## 2021-12-22 DIAGNOSIS — E87.6 HYPOKALEMIA: ICD-10-CM

## 2021-12-22 LAB
ALBUMIN SERPL-MCNC: 4 G/DL (ref 3.5–5.2)
ALP BLD-CCNC: 63 U/L (ref 35–104)
ALT SERPL-CCNC: 22 U/L (ref 0–32)
ANION GAP SERPL CALCULATED.3IONS-SCNC: 13 MMOL/L (ref 7–16)
AST SERPL-CCNC: 26 U/L (ref 0–31)
BASOPHILS ABSOLUTE: 0.04 E9/L (ref 0–0.2)
BASOPHILS RELATIVE PERCENT: 0.8 % (ref 0–2)
BILIRUB SERPL-MCNC: 0.9 MG/DL (ref 0–1.2)
BUN BLDV-MCNC: 17 MG/DL (ref 6–23)
CALCIUM SERPL-MCNC: 9.7 MG/DL (ref 8.6–10.2)
CHLORIDE BLD-SCNC: 103 MMOL/L (ref 98–107)
CHOLESTEROL, TOTAL: 171 MG/DL (ref 0–199)
CO2: 27 MMOL/L (ref 22–29)
CREAT SERPL-MCNC: 1 MG/DL (ref 0.5–1)
EOSINOPHILS ABSOLUTE: 0.11 E9/L (ref 0.05–0.5)
EOSINOPHILS RELATIVE PERCENT: 2.2 % (ref 0–6)
GFR AFRICAN AMERICAN: >60
GFR NON-AFRICAN AMERICAN: 56 ML/MIN/1.73
GLUCOSE BLD-MCNC: 102 MG/DL (ref 74–99)
HCT VFR BLD CALC: 42 % (ref 34–48)
HDLC SERPL-MCNC: 49 MG/DL
HEMOGLOBIN: 13.8 G/DL (ref 11.5–15.5)
IMMATURE GRANULOCYTES #: 0.02 E9/L
IMMATURE GRANULOCYTES %: 0.4 % (ref 0–5)
LDL CHOLESTEROL CALCULATED: 100 MG/DL (ref 0–99)
LYMPHOCYTES ABSOLUTE: 1.73 E9/L (ref 1.5–4)
LYMPHOCYTES RELATIVE PERCENT: 33.9 % (ref 20–42)
MCH RBC QN AUTO: 29.2 PG (ref 26–35)
MCHC RBC AUTO-ENTMCNC: 32.9 % (ref 32–34.5)
MCV RBC AUTO: 89 FL (ref 80–99.9)
MONOCYTES ABSOLUTE: 0.41 E9/L (ref 0.1–0.95)
MONOCYTES RELATIVE PERCENT: 8 % (ref 2–12)
NEUTROPHILS ABSOLUTE: 2.79 E9/L (ref 1.8–7.3)
NEUTROPHILS RELATIVE PERCENT: 54.7 % (ref 43–80)
PDW BLD-RTO: 11.9 FL (ref 11.5–15)
PLATELET # BLD: 253 E9/L (ref 130–450)
PMV BLD AUTO: 11.7 FL (ref 7–12)
POTASSIUM SERPL-SCNC: 4.5 MMOL/L (ref 3.5–5)
RBC # BLD: 4.72 E12/L (ref 3.5–5.5)
SODIUM BLD-SCNC: 143 MMOL/L (ref 132–146)
TOTAL PROTEIN: 6.6 G/DL (ref 6.4–8.3)
TRIGL SERPL-MCNC: 111 MG/DL (ref 0–149)
VLDLC SERPL CALC-MCNC: 22 MG/DL
WBC # BLD: 5.1 E9/L (ref 4.5–11.5)

## 2021-12-27 ENCOUNTER — OFFICE VISIT (OUTPATIENT)
Dept: FAMILY MEDICINE CLINIC | Age: 61
End: 2021-12-27
Payer: COMMERCIAL

## 2021-12-27 VITALS
SYSTOLIC BLOOD PRESSURE: 130 MMHG | TEMPERATURE: 97.8 F | DIASTOLIC BLOOD PRESSURE: 75 MMHG | WEIGHT: 223 LBS | OXYGEN SATURATION: 96 % | BODY MASS INDEX: 34.93 KG/M2 | HEART RATE: 79 BPM

## 2021-12-27 DIAGNOSIS — E87.6 HYPOKALEMIA: ICD-10-CM

## 2021-12-27 DIAGNOSIS — I10 HTN (HYPERTENSION), BENIGN: Primary | ICD-10-CM

## 2021-12-27 DIAGNOSIS — M79.10 MYALGIA: ICD-10-CM

## 2021-12-27 DIAGNOSIS — M54.50 CHRONIC LOW BACK PAIN WITHOUT SCIATICA, UNSPECIFIED BACK PAIN LATERALITY: ICD-10-CM

## 2021-12-27 DIAGNOSIS — G89.29 CHRONIC LOW BACK PAIN WITHOUT SCIATICA, UNSPECIFIED BACK PAIN LATERALITY: ICD-10-CM

## 2021-12-27 DIAGNOSIS — E78.2 MIXED HYPERLIPIDEMIA: ICD-10-CM

## 2021-12-27 PROCEDURE — 99214 OFFICE O/P EST MOD 30 MIN: CPT | Performed by: FAMILY MEDICINE

## 2021-12-27 ASSESSMENT — ENCOUNTER SYMPTOMS
EYES NEGATIVE: 1
RESPIRATORY NEGATIVE: 1
GASTROINTESTINAL NEGATIVE: 1
ALLERGIC/IMMUNOLOGIC NEGATIVE: 1

## 2021-12-27 NOTE — PROGRESS NOTES
OFFICE PROGRESS NOTE      SUBJECTIVE:        Patient ID:   Cheryle Castilla is a 64 y.o. female who presents for   Chief Complaint   Patient presents with    Hypertension     Here for recheck on Hypertension. Requeswting an excuse for being off earlier this month due to covid. HPI:   Patient had Covid infection is better now  Lab work is within normal limits  Blood pressure is stable  Patient trying to follow the diet and exercise  Complaining of myalgia         Prior to Visit Medications    Medication Sig Taking? Authorizing Provider   triamterene-hydroCHLOROthiazide (MAXZIDE-25) 37.5-25 MG per tablet TAKE ONE TABLET BY MOUTH DAILY Yes Frederick Womack MD   potassium chloride (KLOR-CON) 10 MEQ extended release tablet TAKE ONE TABLET BY MOUTH TWO TIMES A DAY Yes Frederick Womack MD   simvastatin (ZOCOR) 10 MG tablet Take 1 tablet by mouth nightly Yes Frederick Womack MD   naproxen sodium (ALEVE) 220 MG tablet Take 220 mg by mouth 2 times daily (with meals) Yes Historical Provider, MD   methocarbamol (ROBAXIN) 750 MG tablet Take 1 tablet by mouth 2 times daily as needed (for pain (may cause drowsiness)) Yes Bari Hernandez PA-C   oxyCODONE-acetaminophen (PERCOCET) 5-325 MG per tablet Take 1 tablet by mouth every 8 hours as needed for Pain.  Yes Historical Provider, MD   Coenzyme Q10 (COQ10) 200 MG CAPS Take by mouth daily Yes Historical Provider, MD   Pyridoxine HCl (VITAMIN B-6 PO) Take by mouth daily Yes Historical Provider, MD      Social History     Socioeconomic History    Marital status:      Spouse name: None    Number of children: None    Years of education: None    Highest education level: None   Occupational History    None   Tobacco Use    Smoking status: Former Smoker     Packs/day: 0.30     Years: 15.00     Pack years: 4.50     Types: Cigarettes     Quit date: 1989     Years since quittin.9    Smokeless tobacco: Never Used   Vaping Use    Vaping Use: Never used   Substance and Sexual Activity    Alcohol use: Yes     Comment: occasional beer or wine    Drug use: No    Sexual activity: Yes     Partners: Male   Other Topics Concern    None   Social History Narrative    None     Social Determinants of Health     Financial Resource Strain: Low Risk     Difficulty of Paying Living Expenses: Not hard at all   Food Insecurity: No Food Insecurity    Worried About Running Out of Food in the Last Year: Never true    Jonathan of Food in the Last Year: Never true   Transportation Needs:     Lack of Transportation (Medical): Not on file    Lack of Transportation (Non-Medical): Not on file   Physical Activity:     Days of Exercise per Week: Not on file    Minutes of Exercise per Session: Not on file   Stress:     Feeling of Stress : Not on file   Social Connections:     Frequency of Communication with Friends and Family: Not on file    Frequency of Social Gatherings with Friends and Family: Not on file    Attends Mosque Services: Not on file    Active Member of [address] [place] [address] or Organizations: Not on file    Attends Club or Organization Meetings: Not on file    Marital Status: Not on file   Intimate Partner Violence:     Fear of Current or Ex-Partner: Not on file    Emotionally Abused: Not on file    Physically Abused: Not on file    Sexually Abused: Not on file   Housing Stability:     Unable to Pay for Housing in the Last Year: Not on file    Number of [place] in the Last Year: Not on file    Unstable Housing in the Last Year: Not on file       I have reviewed Dayanna's allergies, medications, problem list, medical, social and family history and have updated as needed in the electronic medical record  Review Of Systems:    Review of Systems   Constitutional: Negative. HENT: Negative. Eyes: Negative. Respiratory: Negative. Cardiovascular: Negative. Gastrointestinal: Negative. Endocrine: Negative.     Musculoskeletal: Positive for arthralgias and myalgias. Skin: Negative. Allergic/Immunologic: Negative. Neurological: Negative. Hematological: Negative. Psychiatric/Behavioral: Negative. OBJECTIVE:     VS:  Wt Readings from Last 3 Encounters:   12/27/21 223 lb (101.2 kg)   09/27/21 221 lb (100.2 kg)   07/19/21 224 lb (101.6 kg)     Temp Readings from Last 3 Encounters:   12/27/21 97.8 °F (36.6 °C) (Infrared)   09/27/21 97.8 °F (36.6 °C) (Infrared)   07/19/21 97.6 °F (36.4 °C) (Infrared)     BP Readings from Last 3 Encounters:   12/27/21 130/75   09/27/21 133/80   07/19/21 (!) 118/56        Physical Exam  Constitutional:       Appearance: She is well-developed. HENT:      Head: Normocephalic and atraumatic. Eyes:      Conjunctiva/sclera: Conjunctivae normal.      Pupils: Pupils are equal, round, and reactive to light. Cardiovascular:      Rate and Rhythm: Normal rate and regular rhythm. Heart sounds: Normal heart sounds. Pulmonary:      Effort: Pulmonary effort is normal.      Breath sounds: Normal breath sounds. Abdominal:      General: Bowel sounds are normal.      Palpations: Abdomen is soft. Musculoskeletal:         General: Normal range of motion. Cervical back: Normal range of motion and neck supple. Skin:     General: Skin is warm and dry. Neurological:      Mental Status: She is alert and oriented to person, place, and time. Psychiatric:         Thought Content:  Thought content normal.            Labs :    Lab Results   Component Value Date    WBC 5.1 12/22/2021    HGB 13.8 12/22/2021    HCT 42.0 12/22/2021     12/22/2021    CHOL 171 12/22/2021    TRIG 111 12/22/2021    HDL 49 12/22/2021    ALT 22 12/22/2021    AST 26 12/22/2021     12/22/2021    K 4.5 12/22/2021     12/22/2021    CREATININE 1.0 12/22/2021    BUN 17 12/22/2021    CO2 27 12/22/2021    TSH 1.010 03/11/2017    INR 0.9 03/10/2017    GLUF 100 (H) 03/17/2019     Lab Results   Component Value Date    COLORU Yellow 02/08/2020    NITRU Negative 02/08/2020    GLUCOSEU Negative 02/08/2020    KETUA Negative 02/08/2020    UROBILINOGEN 0.2 02/08/2020    BILIRUBINUR Negative 02/08/2020     No results found for: PSA, CEA, , HO8025,       Controlled Substances Monitoring:                                    ASSESSMENT     Patient Active Problem List    Diagnosis Date Noted    Back pain 03/05/2018    HTN (hypertension), benign 03/10/2017    Hyperlipemia 03/10/2017    Hypokalemia 03/10/2017        Diagnosis:     ICD-10-CM    1. HTN (hypertension), benign  I10 CBC WITH AUTO DIFFERENTIAL   2. Hypokalemia  E87.6    3. Mixed hyperlipidemia  E78.2 CBC WITH AUTO DIFFERENTIAL     Comprehensive Metabolic Panel     Lipid Panel   4. Chronic low back pain without sciatica, unspecified back pain laterality  M54.50 CBC WITH AUTO DIFFERENTIAL    G89.29    5. Myalgia  M79.10 CBC WITH AUTO DIFFERENTIAL       PLAN:   Continue present treatment  Low-sodium low-fat diet  Regular exercises  Lab work before the next visit  Return to clinic earlier if any problems        Patient Instructions   Continue present treatment  Low-sodium low-fat diet  Compresses to the aching joints  Regular exercises  Lab work before the next visit  Return to clinic earlier if any problems      Return in about 3 months (around 3/27/2022). Racquel Gomes reviewed my findings and recommendations with Donna Reddy.     Electronicallysigned by Alex Monae MD on 12/27/21 at 4:03 PM EST

## 2021-12-27 NOTE — PATIENT INSTRUCTIONS
Continue present treatment  Low-sodium low-fat diet  Compresses to the aching joints  Regular exercises  Lab work before the next visit  Return to clinic earlier if any problems

## 2022-01-20 ENCOUNTER — TELEPHONE (OUTPATIENT)
Dept: FAMILY MEDICINE CLINIC | Age: 62
End: 2022-01-20

## 2022-01-20 NOTE — TELEPHONE ENCOUNTER
----- Message from Enid Gee sent at 1/20/2022 10:42 AM EST -----  Subject: Appointment Request    Reason for Call: Urgent (Patient Request) Joint Pain    QUESTIONS  Type of Appointment? Established Patient  Reason for appointment request? No appointments available during search  Additional Information for Provider? Pt's  called in requesting a   time for Pt on Monday or Tuesday, but would accept Wednesday 1/26/2022 if   the appt could be after 4:00p (he indicated that this happens quite   frequently). Pt is having hip pain and has had it for the past few months. Please call Pt to schedule appt that meets everyone's needs. Thank you.  ---------------------------------------------------------------------------  --------------  CALL BACK INFO  What is the best way for the office to contact you? OK to leave message on   voicemail  Preferred Call Back Phone Number? [phone]  ---------------------------------------------------------------------------  --------------  SCRIPT ANSWERS  Relationship to Patient? Self  Did you have an injury or trauma within the past 3 days? No  Is your joint red or swollen? No  Are you having new onset numbness, tingling, or weakness with the pain? No  Did you have an injury or trauma within the past 14 days? No  Did your pain begin within the past week? No  Are you having fevers (100.4), chills, or sweats? No  (Is the patient requesting to be seen urgently for their symptoms?)? Yes  Have you been diagnosed with, awaiting test results for, or told that you   are suspected of having COVID-19 (Coronavirus)? (If patient has tested   negative or was tested as a requirement for work, school, or travel and   not based on symptoms, answer no)? No  Within the past two weeks have you developed any of the following symptoms   (answer no if symptoms have been present longer than 2 weeks or began   more than 2 weeks ago)?  Fever or Chills, Cough, Shortness of breath or   difficulty breathing, Loss of taste or smell, Sore throat, Nasal   congestion, Sneezing or runny nose, Fatigue or generalized body aches   (answer no if pain is specific to a body part e.g. back pain), Diarrhea,   Headache? No  Have you had close contact with someone with COVID-19 in the last 14 days? No  (Service Expert  click yes below to proceed with [employer] As Usual   Scheduling)?  Yes

## 2022-01-20 NOTE — TELEPHONE ENCOUNTER
Message left for patient that I could schedule her for the [place] site tomorrow for her joint pain.

## 2022-01-23 ENCOUNTER — HOSPITAL ENCOUNTER (EMERGENCY)
Age: 62
Discharge: HOME | End: 2022-01-23
Payer: COMMERCIAL

## 2022-01-23 ENCOUNTER — APPOINTMENT (OUTPATIENT)
Dept: ULTRASOUND IMAGING | Age: 62
End: 2022-01-23
Payer: COMMERCIAL

## 2022-01-23 ENCOUNTER — APPOINTMENT (OUTPATIENT)
Dept: CT IMAGING | Age: 62
End: 2022-01-23
Payer: COMMERCIAL

## 2022-01-23 VITALS
DIASTOLIC BLOOD PRESSURE: 85 MMHG | HEART RATE: 77 BPM | SYSTOLIC BLOOD PRESSURE: 153 MMHG | RESPIRATION RATE: 18 BRPM | BODY MASS INDEX: 34.46 KG/M2 | WEIGHT: 220 LBS | TEMPERATURE: 97.3 F | OXYGEN SATURATION: 97 %

## 2022-01-23 DIAGNOSIS — M79.605 LEFT LEG PAIN: Primary | ICD-10-CM

## 2022-01-23 DIAGNOSIS — M54.32 SCIATICA OF LEFT SIDE: ICD-10-CM

## 2022-01-23 PROCEDURE — 6360000002 HC RX W HCPCS: Performed by: PHYSICIAN ASSISTANT

## 2022-01-23 PROCEDURE — 93971 EXTREMITY STUDY: CPT

## 2022-01-23 PROCEDURE — 72131 CT LUMBAR SPINE W/O DYE: CPT

## 2022-01-23 PROCEDURE — 96372 THER/PROPH/DIAG INJ SC/IM: CPT

## 2022-01-23 PROCEDURE — 99211 OFF/OP EST MAY X REQ PHY/QHP: CPT

## 2022-01-23 RX ORDER — OXYCODONE HYDROCHLORIDE AND ACETAMINOPHEN 5; 325 MG/1; MG/1
1-2 TABLET ORAL EVERY 6 HOURS PRN
Qty: 20 TABLET | Refills: 0 | Status: SHIPPED | OUTPATIENT
Start: 2022-01-23 | End: 2022-01-28

## 2022-01-23 RX ORDER — KETOROLAC TROMETHAMINE 30 MG/ML
30 INJECTION, SOLUTION INTRAMUSCULAR; INTRAVENOUS ONCE
Status: COMPLETED | OUTPATIENT
Start: 2022-01-23 | End: 2022-01-23

## 2022-01-23 RX ORDER — DEXAMETHASONE SODIUM PHOSPHATE 10 MG/ML
10 INJECTION, SOLUTION INTRAMUSCULAR; INTRAVENOUS ONCE
Status: COMPLETED | OUTPATIENT
Start: 2022-01-23 | End: 2022-01-23

## 2022-01-23 RX ADMIN — DEXAMETHASONE SODIUM PHOSPHATE 10 MG: 10 INJECTION, SOLUTION INTRAMUSCULAR; INTRAVENOUS at 12:30

## 2022-01-23 RX ADMIN — KETOROLAC TROMETHAMINE 30 MG: 30 INJECTION, SOLUTION INTRAMUSCULAR; INTRAVENOUS at 12:29

## 2022-01-23 ASSESSMENT — PAIN SCALES - GENERAL: PAINLEVEL_OUTOF10: 10

## 2022-01-23 NOTE — ED PROVIDER NOTES
[facility]                                                                                                                                    Department of Emergency Medicine   ED  Provider Note  Admit Date/RoomTime: 1/23/2022 11:46 AM  ED Room: NAS/NAS        HPI:  1/23/22,   Time: 12:51 PM ABHIJIT Villa is a 64 y.o. female presenting to the ED for left groin and leg pain, beginning 4 days ago. The complaint has been persistent, moderate in severity, and worsened by nothing. The patient states that this pain began out of the blue several days ago. She denies any fall or trauma. The patient states that she has a cramping and burning pain in the posterior left calf. There is also some pain in her left groin. The patient has had 2 prior back surgeries. She states the first was in 2009 and the second in 2018, both at the [facility]. The patient states she has no hardware in her back. Again there was no fall or trauma. The pain has been constant and she reports that it is a 10 out of 10. Over-the-counter meds have not been helping. The patient is not a diabetic. She denies any loss of bowel or bladder control. There is no saddle numbness or paresthesias.          ROS:     Constitutional: Negative for fever and chills  HENT: Negative for ear pain, sore throat and sinus pressure  Eyes: Negative for pain, discharge and redness  Respiratory:  Negative for shortness of breath, cough and wheezing  Cardiovascular: Negative for CP, edema or palpitations  Gastrointestinal: Negative for nausea, vomiting, diarrhea and abdominal distention  Genitourinary: Negative for dysuria and frequency  Musculoskeletal:  See HPI  Skin: Negative for rash and wound  Neurological: Negative for weakness and headaches  Hematological: Negative for adenopathy    All other systems reviewed and are negative      -------------------------------- PAST HISTORY ----------------------------------  Past Medical History: has a past medical history of Back pain, H/O cardiovascular stress test, Hyperlipidemia, Hypertension, and PONV (postoperative nausea and vomiting). Past Surgical History:  has a past surgical history that includes back surgery (2009); Cholecystectomy (1998); partial hysterectomy (cervix not removed) (2003); Foot surgery (Right); Foot surgery (Left); Colonoscopy; Hysterectomy; and other surgical history (02/24/2017). Social History:  reports that she quit smoking about 32 years ago. Her smoking use included cigarettes. She has a 4.50 pack-year smoking history. She has never used smokeless tobacco. She reports current alcohol use. She reports that she does not use drugs. Family History: family history includes Cancer in her mother; Heart Failure in her father; Hypertension in her mother; Thyroid Disease in her mother. The patients home medications have been reviewed. Allergies: Penicillins    --------------------------------- RESULTS ------------------------------------------  All laboratory and radiology results have been personally reviewed by myself   LABS:  No results found for this visit on 01/23/22. RADIOLOGY:  Interpreted by Radiologist.  Yanci   Final Result   Chronic changes with no acute osseous abnormality seen. RECOMMENDATIONS:   Unavailable         US DUP LOWER EXTREMITY LEFT TRAMAINE   Final Result   No evidence of DVT in the left lower extremity. RECOMMENDATIONS:   Unavailable             ----------------- NURSING NOTES AND VITALS REVIEWED ---------------   The nursing notes within the ED encounter and vital signs as below have been reviewed.    BP (!) 153/85   Pulse 77   Temp 97.3 °F (36.3 °C)   Resp 18   Wt 220 lb (99.8 kg)   SpO2 97%   BMI 34.46 kg/m²   Oxygen Saturation Interpretation: Normal      --------------------------------PHYSICAL EXAM------------------------------------      Constitutional/General: Alert and oriented x3, well appearing, non toxic in NAD  Head: NC/AT  Eyes: PERRL, EOMI  Mouth: Oropharynx clear, handling secretions, no trismus  Neck: Supple, full ROM, no meningeal signs  Pulmonary: Lungs clear to auscultation bilaterally, no wheezes, rales, or rhonchi. Not in respiratory distress  Cardiovascular:  Regular rate and rhythm, no murmurs, gallops, or rubs. 2+ distal pulses  Abdomen: Soft, + BS. No distension. Nontender. No palpable rigidity, rebound or guarding  Back:   Healed lumbar scarring noted. No visible rash, erythema or trauma. No reproducible pain on palpation. Antalgic gait. Negative SLR. Extremities: Moves all extremities x 4. Exam of left groin and leg without evidence of redness, rash or edema. Pt is tender over left calf. Warm and well perfused  Skin: warm and dry without rash  Neurologic: GCS 15,  Intact. No focal deficits  Psych: Normal Affect      ------------------------ ED COURSE/MEDICAL DECISION MAKING----------------------  Medications   ketorolac (TORADOL) injection 30 mg (30 mg IntraMUSCular Given 1/23/22 1229)   dexamethasone (PF) (DECADRON) injection 10 mg (10 mg IntraMUSCular Given 1/23/22 1230)         Medical Decision Making:    Pt given Toradol and Decadron here. Sent to [address] [facility],[address] for additional work-up. US negative for DVT. Discussed CT findings as well. I do think this pain is referred back pain. Pt sent home with Percocet for short term use. Advised her to call PCP or prior surgeon for follow-up. She is aware and agreeable to plan. Counseling: The emergency provider has spoken with the patient and discussed todays results, in addition to providing specific details for the plan of care and counseling regarding the diagnosis and prognosis. Questions are answered at this time and they are agreeable with the plan.      ------------------------ IMPRESSION AND DISPOSITION -------------------------------    IMPRESSION  1. Left leg pain    2.  Sciatica of left side DISPOSITION  Disposition: Discharge to home  Patient condition is stable                   Nan Ramos PA-C  01/23/22 4412

## 2022-01-24 ENCOUNTER — OFFICE VISIT (OUTPATIENT)
Dept: FAMILY MEDICINE CLINIC | Age: 62
End: 2022-01-24
Payer: COMMERCIAL

## 2022-01-24 VITALS
TEMPERATURE: 97.4 F | BODY MASS INDEX: 34.3 KG/M2 | WEIGHT: 219 LBS | HEART RATE: 76 BPM | DIASTOLIC BLOOD PRESSURE: 76 MMHG | SYSTOLIC BLOOD PRESSURE: 133 MMHG | OXYGEN SATURATION: 97 %

## 2022-01-24 DIAGNOSIS — I10 HTN (HYPERTENSION), BENIGN: ICD-10-CM

## 2022-01-24 DIAGNOSIS — M54.42 LOW BACK PAIN WITH BILATERAL SCIATICA, UNSPECIFIED BACK PAIN LATERALITY, UNSPECIFIED CHRONICITY: Primary | ICD-10-CM

## 2022-01-24 DIAGNOSIS — M54.41 LOW BACK PAIN WITH BILATERAL SCIATICA, UNSPECIFIED BACK PAIN LATERALITY, UNSPECIFIED CHRONICITY: Primary | ICD-10-CM

## 2022-01-24 DIAGNOSIS — E78.2 MIXED HYPERLIPIDEMIA: ICD-10-CM

## 2022-01-24 PROCEDURE — 99214 OFFICE O/P EST MOD 30 MIN: CPT | Performed by: FAMILY MEDICINE

## 2022-01-24 ASSESSMENT — PATIENT HEALTH QUESTIONNAIRE - PHQ9
SUM OF ALL RESPONSES TO PHQ QUESTIONS 1-9: 0
1. LITTLE INTEREST OR PLEASURE IN DOING THINGS: 0
SUM OF ALL RESPONSES TO PHQ QUESTIONS 1-9: 0
2. FEELING DOWN, DEPRESSED OR HOPELESS: 0
1. LITTLE INTEREST OR PLEASURE IN DOING THINGS: 0
SUM OF ALL RESPONSES TO PHQ QUESTIONS 1-9: 0
SUM OF ALL RESPONSES TO PHQ9 QUESTIONS 1 & 2: 0

## 2022-01-24 ASSESSMENT — ENCOUNTER SYMPTOMS
EYES NEGATIVE: 1
RESPIRATORY NEGATIVE: 1
ALLERGIC/IMMUNOLOGIC NEGATIVE: 1
GASTROINTESTINAL NEGATIVE: 1
BACK PAIN: 1

## 2022-01-24 NOTE — PROGRESS NOTES
OFFICE PROGRESS NOTE      SUBJECTIVE:        Patient ID:   Zohaib Villa is a 64 y.o. female who presents for   Chief Complaint   Patient presents with    Back Pain     Here for recheck following urgent care visit for sciatica. HPI:   Patient here with his severe pain in the left leg  Was in the urgent care  DVT was ruled out  CAT scan of the spine done  CAT scan was abnormal   degenerative disc disease  Patient had spine surgery in the past couple of times  Has been followed up by the spine surgeon in [place]  Urgent care report reviewed          Prior to Visit Medications    Medication Sig Taking? Authorizing Provider   oxyCODONE-acetaminophen (PERCOCET) 5-325 MG per tablet Take 1-2 tablets by mouth every 6 hours as needed for Pain for up to 5 days.  Yes Amy Hayes PA-C   triamterene-hydroCHLOROthiazide (MAXZIDE-25) 37.5-25 MG per tablet TAKE ONE TABLET BY MOUTH DAILY Yes Ricardo Oseguera MD   potassium chloride (KLOR-CON) 10 MEQ extended release tablet TAKE ONE TABLET BY MOUTH TWO TIMES A DAY Yes Ricardo Oseguera MD   simvastatin (ZOCOR) 10 MG tablet Take 1 tablet by mouth nightly Yes Ricardo Oseguera MD   naproxen sodium (ALEVE) 220 MG tablet Take 220 mg by mouth 2 times daily (with meals) Yes Historical Provider, MD   methocarbamol (ROBAXIN) 750 MG tablet Take 1 tablet by mouth 2 times daily as needed (for pain (may cause drowsiness)) Yes Bari Hernandez PA-C   Coenzyme Q10 (COQ10) 200 MG CAPS Take by mouth daily Yes Historical Provider, MD   Pyridoxine HCl (VITAMIN B-6 PO) Take by mouth daily Yes Historical Provider, MD      Social History     Socioeconomic History    Marital status:      Spouse name: None    Number of children: None    Years of education: None    Highest education level: None   Occupational History    None   Tobacco Use    Smoking status: Former Smoker     Packs/day: 0.30     Years: 15.00     Pack years: 4.50 Types: Cigarettes     Quit date: 1989     Years since quittin.9    Smokeless tobacco: Never Used   Vaping Use    Vaping Use: Never used   Substance and Sexual Activity    Alcohol use: Yes     Comment: occasional beer or wine    Drug use: No    Sexual activity: Yes     Partners: Male   Other Topics Concern    None   Social History Narrative    None     Social Determinants of Health     Financial Resource Strain: Low Risk     Difficulty of Paying Living Expenses: Not hard at all   Food Insecurity: No Food Insecurity    Worried About Running Out of Food in the Last Year: Never true    Jonathan of Food in the Last Year: Never true   Transportation Needs:     Lack of Transportation (Medical): Not on file    Lack of Transportation (Non-Medical): Not on file   Physical Activity:     Days of Exercise per Week: Not on file    Minutes of Exercise per Session: Not on file   Stress:     Feeling of Stress : Not on file   Social Connections:     Frequency of Communication with Friends and Family: Not on file    Frequency of Social Gatherings with Friends and Family: Not on file    Attends Sikhism Services: Not on file    Active Member of [address] [place] [address] or Organizations: Not on file    Attends Club or Organization Meetings: Not on file    Marital Status: Not on file   Intimate Partner Violence:     Fear of Current or Ex-Partner: Not on file    Emotionally Abused: Not on file    Physically Abused: Not on file    Sexually Abused: Not on file   Housing Stability:     Unable to Pay for Housing in the Last Year: Not on file    Number of [place] in the Last Year: Not on file    Unstable Housing in the Last Year: Not on file       I have reviewed Dayanna's allergies, medications, problem list, medical, social and family history and have updated as needed in the electronic medical record  Review Of Systems:    Review of Systems   Constitutional: Negative. HENT: Negative. Eyes: Negative. TSH 1.010 03/11/2017    INR 0.9 03/10/2017    GLUF 100 (H) 03/17/2019     Lab Results   Component Value Date    COLORU Yellow 02/08/2020    NITRU Negative 02/08/2020    GLUCOSEU Negative 02/08/2020    KETUA Negative 02/08/2020    UROBILINOGEN 0.2 02/08/2020    BILIRUBINUR Negative 02/08/2020     No results found for: PSA, CEA, , DC8758,       Controlled Substances Monitoring:                                    ASSESSMENT     Patient Active Problem List    Diagnosis Date Noted    Back pain 03/05/2018    HTN (hypertension), benign 03/10/2017    Hyperlipemia 03/10/2017    Hypokalemia 03/10/2017        Diagnosis:     ICD-10-CM    1. Low back pain with bilateral sciatica, unspecified back pain laterality, unspecified chronicity  M54.42     M54.41    2. HTN (hypertension), benign  I10    3. Mixed hyperlipidemia  E78.2        PLAN:   Off work  Continue present treatment  Follow with the spine surgeon  Return to clinic earlier if any problems        Patient Instructions   Continue present treatment  Off work  Follow with the spine surgeon  Return to clinic earlier if any problems  Continue the medications      Return in about 1 week (around 1/31/2022). Krista [place] reviewed my findings and recommendations with Yan Zacarias.     Electronicallysigned by Gigi Zamarripa MD on 1/24/22 at 10:04 AM EST

## 2022-01-24 NOTE — PATIENT INSTRUCTIONS
Continue present treatment  Off work  Follow with the spine surgeon  Return to clinic earlier if any problems  Continue the medications

## 2022-01-31 ENCOUNTER — OFFICE VISIT (OUTPATIENT)
Dept: FAMILY MEDICINE CLINIC | Age: 62
End: 2022-01-31
Payer: COMMERCIAL

## 2022-01-31 VITALS
TEMPERATURE: 96.6 F | WEIGHT: 216 LBS | BODY MASS INDEX: 33.83 KG/M2 | DIASTOLIC BLOOD PRESSURE: 63 MMHG | SYSTOLIC BLOOD PRESSURE: 134 MMHG | HEART RATE: 77 BPM | OXYGEN SATURATION: 97 %

## 2022-01-31 DIAGNOSIS — E78.2 MIXED HYPERLIPIDEMIA: ICD-10-CM

## 2022-01-31 DIAGNOSIS — M54.41 LOW BACK PAIN WITH BILATERAL SCIATICA, UNSPECIFIED BACK PAIN LATERALITY, UNSPECIFIED CHRONICITY: Primary | ICD-10-CM

## 2022-01-31 DIAGNOSIS — I10 HTN (HYPERTENSION), BENIGN: ICD-10-CM

## 2022-01-31 DIAGNOSIS — M54.42 LOW BACK PAIN WITH BILATERAL SCIATICA, UNSPECIFIED BACK PAIN LATERALITY, UNSPECIFIED CHRONICITY: Primary | ICD-10-CM

## 2022-01-31 PROCEDURE — 99213 OFFICE O/P EST LOW 20 MIN: CPT | Performed by: FAMILY MEDICINE

## 2022-01-31 ASSESSMENT — ENCOUNTER SYMPTOMS
ALLERGIC/IMMUNOLOGIC NEGATIVE: 1
GASTROINTESTINAL NEGATIVE: 1
RESPIRATORY NEGATIVE: 1
BACK PAIN: 1
EYES NEGATIVE: 1

## 2022-01-31 NOTE — PROGRESS NOTES
OFFICE PROGRESS NOTE      SUBJECTIVE:        Patient ID:   Bruna Strauss is a 64 y.o. female who presents for   Chief Complaint   Patient presents with    Back Pain     Here for recheck on low back pain with sciatica. Continues to experience pain. Has been using a cane for ambulation. HPI:   Patient states is still having a lot of pain in the back and the left groin and hip region  She did see her orthopedic in [facility] [employer] [facility]  He has requested MRI waiting for the approval from the insurance company  Patient said pain is so bad she is to use a walking cane  She takes the pain medication in the nighttime  She has been following her diet and exercise  Blood pressure has been stable  Patient been off work  X-ray reviewed  Discussed about the orthopedic surgeons input        Prior to Visit Medications    Medication Sig Taking?  Authorizing Provider   triamterene-hydroCHLOROthiazide (MAXZIDE-25) 37.5-25 MG per tablet TAKE ONE TABLET BY MOUTH DAILY Yes Marifer Castrejon MD   potassium chloride (KLOR-CON) 10 MEQ extended release tablet TAKE ONE TABLET BY MOUTH TWO TIMES A DAY Yes Marifer Castrejon MD   simvastatin (ZOCOR) 10 MG tablet Take 1 tablet by mouth nightly Yes Marifer Castrejon MD   naproxen sodium (ALEVE) 220 MG tablet Take 220 mg by mouth 2 times daily (with meals) Yes Historical Provider, MD   methocarbamol (ROBAXIN) 750 MG tablet Take 1 tablet by mouth 2 times daily as needed (for pain (may cause drowsiness)) Yes Bari Hernandez PA-C   Coenzyme Q10 (COQ10) 200 MG CAPS Take by mouth daily Yes Historical Provider, MD   Pyridoxine HCl (VITAMIN B-6 PO) Take by mouth daily Yes Historical Provider, MD      Social History     Socioeconomic History    Marital status:      Spouse name: None    Number of children: None    Years of education: None    Highest education level: None   Occupational History    None   Tobacco Use    Smoking status: Former Smoker Negative. HENT: Negative. Eyes: Negative. Respiratory: Negative. Cardiovascular: Negative. Gastrointestinal: Negative. Endocrine: Negative. Musculoskeletal: Positive for back pain (The left hip region). Skin: Negative. Allergic/Immunologic: Negative. Neurological: Negative. Hematological: Negative. Psychiatric/Behavioral: Negative. OBJECTIVE:     VS:  Wt Readings from Last 3 Encounters:   01/31/22 216 lb (98 kg)   01/24/22 219 lb (99.3 kg)   01/23/22 220 lb (99.8 kg)     Temp Readings from Last 3 Encounters:   01/31/22 96.6 °F (35.9 °C) (Infrared)   01/24/22 97.4 °F (36.3 °C) (Infrared)   01/23/22 97.3 °F (36.3 °C)     BP Readings from Last 3 Encounters:   01/31/22 134/63   01/24/22 133/76   01/23/22 (!) 153/85        Physical Exam  Constitutional:       Appearance: She is well-developed. HENT:      Head: Normocephalic and atraumatic. Eyes:      Conjunctiva/sclera: Conjunctivae normal.      Pupils: Pupils are equal, round, and reactive to light. Cardiovascular:      Rate and Rhythm: Normal rate and regular rhythm. Heart sounds: Normal heart sounds. Pulmonary:      Effort: Pulmonary effort is normal.      Breath sounds: Normal breath sounds. Abdominal:      General: Bowel sounds are normal.      Palpations: Abdomen is soft. Musculoskeletal:         General: Tenderness (Left hip and groin) present. Normal range of motion. Cervical back: Normal range of motion and neck supple. Skin:     General: Skin is warm and dry. Neurological:      Mental Status: She is alert and oriented to person, place, and time. Psychiatric:         Thought Content:  Thought content normal.            Labs :    Lab Results   Component Value Date    WBC 5.1 12/22/2021    HGB 13.8 12/22/2021    HCT 42.0 12/22/2021     12/22/2021    CHOL 171 12/22/2021    TRIG 111 12/22/2021    HDL 49 12/22/2021    ALT 22 12/22/2021    AST 26 12/22/2021     12/22/2021    K 4.5 12/22/2021     12/22/2021    CREATININE 1.0 12/22/2021    BUN 17 12/22/2021    CO2 27 12/22/2021    TSH 1.010 03/11/2017    INR 0.9 03/10/2017    GLUF 100 (H) 03/17/2019     Lab Results   Component Value Date    COLORU Yellow 02/08/2020    NITRU Negative 02/08/2020    GLUCOSEU Negative 02/08/2020    KETUA Negative 02/08/2020    UROBILINOGEN 0.2 02/08/2020    BILIRUBINUR Negative 02/08/2020     No results found for: PSA, CEA, , QV2792,       Controlled Substances Monitoring:                                    ASSESSMENT     Patient Active Problem List    Diagnosis Date Noted    Back pain 03/05/2018    HTN (hypertension), benign 03/10/2017    Hyperlipemia 03/10/2017    Hypokalemia 03/10/2017        Diagnosis:     ICD-10-CM    1. Low back pain with bilateral sciatica, unspecified back pain laterality, unspecified chronicity  M54.42     M54.41    2. HTN (hypertension), benign  I10    3. Mixed hyperlipidemia  E78.2        PLAN:   Continue present treatment  Low-sodium low-fat diet  Follow with orthopedic surgeon  Orthopedic surgeon to sign the sick leave papers  Return to clinic at her regular appointment        Patient Instructions   Continue present treatment  Follow-up with orthopedic surgeon  Have the orthopedic surgeon decide on return back to work  Continue low-sodium low-fat diet  Return to clinic earlier if any problem      Return if symptoms worsen or fail to improve   return to clinic at the regular appointment. Ramiro Joaquin reviewed my findings and recommendations with Tunde Miller.     Electronicallysigned by Rickey Nguyen MD on 1/31/22 at 9:12 AM EST

## 2022-01-31 NOTE — PATIENT INSTRUCTIONS
Continue present treatment  Follow-up with orthopedic surgeon  Have the orthopedic surgeon decide on return back to work  Continue low-sodium low-fat diet  Return to clinic earlier if any problem

## 2022-02-11 DIAGNOSIS — E87.6 HYPOKALEMIA: ICD-10-CM

## 2022-02-11 DIAGNOSIS — E78.2 MIXED HYPERLIPIDEMIA: ICD-10-CM

## 2022-02-11 RX ORDER — SIMVASTATIN 10 MG
10 TABLET ORAL NIGHTLY
Qty: 90 TABLET | Refills: 1 | Status: SHIPPED
Start: 2022-02-11 | End: 2022-06-27 | Stop reason: SDUPTHER

## 2022-02-11 RX ORDER — POTASSIUM CHLORIDE 750 MG/1
TABLET, FILM COATED, EXTENDED RELEASE ORAL
Qty: 180 TABLET | Refills: 1 | Status: SHIPPED
Start: 2022-02-11 | End: 2022-06-27 | Stop reason: SDUPTHER

## 2022-03-11 DIAGNOSIS — I10 HTN (HYPERTENSION), BENIGN: ICD-10-CM

## 2022-03-11 NOTE — TELEPHONE ENCOUNTER
Last Appointment:  1/31/2022  Future Appointments   Date Time Provider Bri Diez   3/28/2022  4:15 PM Justen Ennis MD [facility]

## 2022-03-14 RX ORDER — TRIAMTERENE AND HYDROCHLOROTHIAZIDE 37.5; 25 MG/1; MG/1
TABLET ORAL
Qty: 90 TABLET | Refills: 0 | Status: SHIPPED
Start: 2022-03-14 | End: 2022-06-13

## 2022-03-22 ENCOUNTER — HOSPITAL ENCOUNTER (OUTPATIENT)
Age: 62
Discharge: HOME | End: 2022-03-22
Payer: COMMERCIAL

## 2022-03-22 DIAGNOSIS — M79.10 MYALGIA: ICD-10-CM

## 2022-03-22 DIAGNOSIS — I10 HTN (HYPERTENSION), BENIGN: ICD-10-CM

## 2022-03-22 DIAGNOSIS — G89.29 CHRONIC LOW BACK PAIN WITHOUT SCIATICA, UNSPECIFIED BACK PAIN LATERALITY: ICD-10-CM

## 2022-03-22 DIAGNOSIS — M54.50 CHRONIC LOW BACK PAIN WITHOUT SCIATICA, UNSPECIFIED BACK PAIN LATERALITY: ICD-10-CM

## 2022-03-22 DIAGNOSIS — E78.2 MIXED HYPERLIPIDEMIA: ICD-10-CM

## 2022-03-22 LAB
ALBUMIN SERPL-MCNC: 4 G/DL (ref 3.5–5.2)
ALP BLD-CCNC: 65 U/L (ref 35–104)
ALT SERPL-CCNC: 21 U/L (ref 0–32)
ANION GAP SERPL CALCULATED.3IONS-SCNC: 10 MMOL/L (ref 7–16)
AST SERPL-CCNC: 21 U/L (ref 0–31)
BASOPHILS ABSOLUTE: 0.05 E9/L (ref 0–0.2)
BASOPHILS RELATIVE PERCENT: 1 % (ref 0–2)
BILIRUB SERPL-MCNC: 0.8 MG/DL (ref 0–1.2)
BUN BLDV-MCNC: 18 MG/DL (ref 6–23)
CALCIUM SERPL-MCNC: 9.5 MG/DL (ref 8.6–10.2)
CHLORIDE BLD-SCNC: 102 MMOL/L (ref 98–107)
CHOLESTEROL, TOTAL: 160 MG/DL (ref 0–199)
CO2: 27 MMOL/L (ref 22–29)
CREAT SERPL-MCNC: 0.7 MG/DL (ref 0.5–1)
EOSINOPHILS ABSOLUTE: 0.09 E9/L (ref 0.05–0.5)
EOSINOPHILS RELATIVE PERCENT: 1.7 % (ref 0–6)
GFR AFRICAN AMERICAN: >60
GFR NON-AFRICAN AMERICAN: >60 ML/MIN/1.73
GLUCOSE BLD-MCNC: 95 MG/DL (ref 74–99)
HCT VFR BLD CALC: 40.7 % (ref 34–48)
HDLC SERPL-MCNC: 59 MG/DL
HEMOGLOBIN: 14.1 G/DL (ref 11.5–15.5)
IMMATURE GRANULOCYTES #: 0.02 E9/L
IMMATURE GRANULOCYTES %: 0.4 % (ref 0–5)
LDL CHOLESTEROL CALCULATED: 81 MG/DL (ref 0–99)
LYMPHOCYTES ABSOLUTE: 1.59 E9/L (ref 1.5–4)
LYMPHOCYTES RELATIVE PERCENT: 30.3 % (ref 20–42)
MCH RBC QN AUTO: 29.5 PG (ref 26–35)
MCHC RBC AUTO-ENTMCNC: 34.6 % (ref 32–34.5)
MCV RBC AUTO: 85.1 FL (ref 80–99.9)
MONOCYTES ABSOLUTE: 0.39 E9/L (ref 0.1–0.95)
MONOCYTES RELATIVE PERCENT: 7.4 % (ref 2–12)
NEUTROPHILS ABSOLUTE: 3.11 E9/L (ref 1.8–7.3)
NEUTROPHILS RELATIVE PERCENT: 59.2 % (ref 43–80)
PDW BLD-RTO: 12.4 FL (ref 11.5–15)
PLATELET # BLD: 174 E9/L (ref 130–450)
PMV BLD AUTO: 11.6 FL (ref 7–12)
POTASSIUM SERPL-SCNC: 3.8 MMOL/L (ref 3.5–5)
RBC # BLD: 4.78 E12/L (ref 3.5–5.5)
SODIUM BLD-SCNC: 139 MMOL/L (ref 132–146)
TOTAL PROTEIN: 6.4 G/DL (ref 6.4–8.3)
TRIGL SERPL-MCNC: 98 MG/DL (ref 0–149)
VLDLC SERPL CALC-MCNC: 20 MG/DL
WBC # BLD: 5.3 E9/L (ref 4.5–11.5)

## 2022-03-22 PROCEDURE — 80053 COMPREHEN METABOLIC PANEL: CPT

## 2022-03-22 PROCEDURE — 85025 COMPLETE CBC W/AUTO DIFF WBC: CPT

## 2022-03-22 PROCEDURE — 80061 LIPID PANEL: CPT

## 2022-03-22 PROCEDURE — 36415 COLL VENOUS BLD VENIPUNCTURE: CPT

## 2022-03-28 ENCOUNTER — OFFICE VISIT (OUTPATIENT)
Dept: FAMILY MEDICINE CLINIC | Age: 62
End: 2022-03-28
Payer: COMMERCIAL

## 2022-03-28 VITALS
TEMPERATURE: 97.1 F | OXYGEN SATURATION: 97 % | WEIGHT: 227 LBS | BODY MASS INDEX: 35.55 KG/M2 | DIASTOLIC BLOOD PRESSURE: 76 MMHG | HEART RATE: 79 BPM | SYSTOLIC BLOOD PRESSURE: 138 MMHG

## 2022-03-28 DIAGNOSIS — E78.2 MIXED HYPERLIPIDEMIA: ICD-10-CM

## 2022-03-28 DIAGNOSIS — I10 HTN (HYPERTENSION), BENIGN: Primary | ICD-10-CM

## 2022-03-28 DIAGNOSIS — M19.90 ARTHRITIS: ICD-10-CM

## 2022-03-28 PROCEDURE — 99214 OFFICE O/P EST MOD 30 MIN: CPT | Performed by: FAMILY MEDICINE

## 2022-03-28 ASSESSMENT — ENCOUNTER SYMPTOMS
EYES NEGATIVE: 1
RESPIRATORY NEGATIVE: 1
ALLERGIC/IMMUNOLOGIC NEGATIVE: 1
GASTROINTESTINAL NEGATIVE: 1

## 2022-03-28 NOTE — PROGRESS NOTES
OFFICE PROGRESS NOTE      SUBJECTIVE:        Patient ID:   Sugar Orantes is a 64 y.o. female who presents for   Chief Complaint   Patient presents with    Hypertension     Here for recheck on Hypertension and Hyperlipidemia. Lab work done,here for review. HPI:   Patient complaining of left hip pain  Has been followed by the orthopedic surgeon  Lab work is within normal limit  Patient not exercising because the hip pain  She been off work because the hip problems        Prior to Visit Medications    Medication Sig Taking?  Authorizing Provider   triamterene-hydroCHLOROthiazide (MAXZIDE-25) 37.5-25 MG per tablet TAKE ONE TABLET BY MOUTH DAILY Yes Kasandra Scott MD   simvastatin (ZOCOR) 10 MG tablet TAKE 1 TABLET BY MOUTH NIGHTLY Yes Kasandra Scott MD   potassium chloride (KLOR-CON) 10 MEQ extended release tablet TAKE ONE TABLET BY MOUTH TWO TIMES A DAY Yes Kasandra Scott MD   naproxen sodium (ALEVE) 220 MG tablet Take 220 mg by mouth 2 times daily (with meals) Yes Historical Provider, MD   methocarbamol (ROBAXIN) 750 MG tablet Take 1 tablet by mouth 2 times daily as needed (for pain (may cause drowsiness)) Yes Bari Hernandez PA-C   Coenzyme Q10 (COQ10) 200 MG CAPS Take by mouth daily Yes Historical Provider, MD   Pyridoxine HCl (VITAMIN B-6 PO) Take by mouth daily Yes Historical Provider, MD      Social History     Socioeconomic History    Marital status:      Spouse name: None    Number of children: None    Years of education: None    Highest education level: None   Occupational History    None   Tobacco Use    Smoking status: Former Smoker     Packs/day: 0.30     Years: 15.00     Pack years: 4.50     Types: Cigarettes     Quit date: 1989     Years since quittin.1    Smokeless tobacco: Never Used   Vaping Use    Vaping Use: Never used   Substance and Sexual Activity    Alcohol use: Yes     Comment: occasional beer or wine    Drug use: No    Sexual activity: Yes     Partners: Male   Other Topics Concern    None   Social History Narrative    None     Social Determinants of Health     Financial Resource Strain: Low Risk     Difficulty of Paying Living Expenses: Not hard at all   Food Insecurity: No Food Insecurity    Worried About Running Out of Food in the Last Year: Never true    [address] in the Last Year: Never true   Transportation Needs:     Lack of Transportation (Medical): Not on file    Lack of Transportation (Non-Medical): Not on file   Physical Activity:     Days of Exercise per Week: Not on file    Minutes of Exercise per Session: Not on file   Stress:     Feeling of Stress : Not on file   Social Connections:     Frequency of Communication with Friends and Family: Not on file    Frequency of Social Gatherings with Friends and Family: Not on file    Attends [employer] Services: Not on file    Active Member of [address] [place] [address] or Organizations: Not on file    Attends Club or Organization Meetings: Not on file    Marital Status: Not on file   Intimate Partner Violence:     Fear of Current or Ex-Partner: Not on file    Emotionally Abused: Not on file    Physically Abused: Not on file    Sexually Abused: Not on file   Housing Stability:     Unable to Pay for Housing in the Last Year: Not on file    Number of [place] in the Last Year: Not on file    Unstable Housing in the Last Year: Not on file       I have reviewed Dayanna's allergies, medications, problem list, medical, social and family history and have updated as needed in the electronic medical record  Review Of Systems:    Review of Systems   Constitutional: Negative. HENT: Negative. Eyes: Negative. Respiratory: Negative. Cardiovascular: Negative. Gastrointestinal: Negative. Endocrine: Negative. Musculoskeletal: Positive for arthralgias. Skin: Negative. Allergic/Immunologic: Negative. Neurological: Negative.     Hematological: Negative. Psychiatric/Behavioral: Negative. OBJECTIVE:     VS:  Wt Readings from Last 3 Encounters:   03/28/22 227 lb (103 kg)   01/31/22 216 lb (98 kg)   01/24/22 219 lb (99.3 kg)     Temp Readings from Last 3 Encounters:   03/28/22 97.1 °F (36.2 °C) (Infrared)   01/31/22 96.6 °F (35.9 °C) (Infrared)   01/24/22 97.4 °F (36.3 °C) (Infrared)     BP Readings from Last 3 Encounters:   03/28/22 138/76   01/31/22 134/63   01/24/22 133/76        Physical Exam  Constitutional:       Appearance: She is well-developed. HENT:      Head: Normocephalic and atraumatic. Eyes:      Conjunctiva/sclera: Conjunctivae normal.      Pupils: Pupils are equal, round, and reactive to light. Cardiovascular:      Rate and Rhythm: Normal rate and regular rhythm. Heart sounds: Normal heart sounds. Pulmonary:      Effort: Pulmonary effort is normal.      Breath sounds: Normal breath sounds. Abdominal:      General: Bowel sounds are normal.      Palpations: Abdomen is soft. Musculoskeletal:         General: Normal range of motion. Cervical back: Normal range of motion and neck supple. Skin:     General: Skin is warm and dry. Neurological:      Mental Status: She is alert and oriented to person, place, and time. Psychiatric:         Thought Content:  Thought content normal.            Labs :    Lab Results   Component Value Date    WBC 5.3 03/22/2022    HGB 14.1 03/22/2022    HCT 40.7 03/22/2022     03/22/2022    CHOL 160 03/22/2022    TRIG 98 03/22/2022    HDL 59 03/22/2022    ALT 21 03/22/2022    AST 21 03/22/2022     03/22/2022    K 3.8 03/22/2022     03/22/2022    CREATININE 0.7 03/22/2022    BUN 18 03/22/2022    CO2 27 03/22/2022    TSH 1.010 03/11/2017    INR 0.9 03/10/2017    GLUF 100 (H) 03/17/2019     Lab Results   Component Value Date    COLORU Yellow 02/08/2020    NITRU Negative 02/08/2020    GLUCOSEU Negative 02/08/2020    KETUA Negative 02/08/2020    UROBILINOGEN 0.2 02/08/2020    BILIRUBINUR Negative 02/08/2020     No results found for: PSA, CEA, , MK0679,       Controlled Substances Monitoring:                                    ASSESSMENT     Patient Active Problem List    Diagnosis Date Noted    Back pain 03/05/2018    HTN (hypertension), benign 03/10/2017    Hyperlipemia 03/10/2017    Hypokalemia 03/10/2017        Diagnosis:     ICD-10-CM    1. HTN (hypertension), benign  I10 CBC with Auto Differential   2. Mixed hyperlipidemia  E78.2 CBC with Auto Differential     Comprehensive Metabolic Panel     Lipid Panel   3. Arthritis  M19.90 CBC with Auto Differential       PLAN:   Continue present treatment  Low-sodium low-fat weight reduction diet  Follow with orthopedic surgeon  Lab work before the next  Return to clinic earlier if any problem        Patient Instructions   Continue present treatment  Low-sodium low-fat weight reduction diet  Follow with orthopedic surgeon  Lab work before the next visit  Return to clinic earlier if any problems      Return in about 3 months (around 6/28/2022) for Medication Check, test results. Chang Fox reviewed my findings and recommendations with Kezia Cannon.     Electronicallysigned by Colton Foy MD on 3/28/22 at 4:10 PM EDT

## 2022-03-28 NOTE — PATIENT INSTRUCTIONS
Continue present treatment  Low-sodium low-fat weight reduction diet  Follow with orthopedic surgeon  Lab work before the next visit  Return to clinic earlier if any problems

## 2022-06-12 DIAGNOSIS — I10 HTN (HYPERTENSION), BENIGN: ICD-10-CM

## 2022-06-13 RX ORDER — TRIAMTERENE AND HYDROCHLOROTHIAZIDE 37.5; 25 MG/1; MG/1
TABLET ORAL
Qty: 90 TABLET | Refills: 0 | Status: SHIPPED
Start: 2022-06-13 | End: 2022-06-27 | Stop reason: SDUPTHER

## 2022-06-18 ENCOUNTER — HOSPITAL ENCOUNTER (OUTPATIENT)
Age: 62
Discharge: HOME | End: 2022-06-18
Payer: COMMERCIAL

## 2022-06-18 DIAGNOSIS — M19.90 ARTHRITIS: ICD-10-CM

## 2022-06-18 DIAGNOSIS — E78.2 MIXED HYPERLIPIDEMIA: ICD-10-CM

## 2022-06-18 DIAGNOSIS — I10 HTN (HYPERTENSION), BENIGN: ICD-10-CM

## 2022-06-18 LAB
ALBUMIN SERPL-MCNC: 4.1 G/DL (ref 3.5–5.2)
ALP BLD-CCNC: 68 U/L (ref 35–104)
ALT SERPL-CCNC: 23 U/L (ref 0–32)
ANION GAP SERPL CALCULATED.3IONS-SCNC: 8 MMOL/L (ref 7–16)
AST SERPL-CCNC: 32 U/L (ref 0–31)
BASOPHILS ABSOLUTE: 0.04 E9/L (ref 0–0.2)
BASOPHILS RELATIVE PERCENT: 1 % (ref 0–2)
BILIRUB SERPL-MCNC: 1.5 MG/DL (ref 0–1.2)
BUN BLDV-MCNC: 18 MG/DL (ref 6–23)
CALCIUM SERPL-MCNC: 9 MG/DL (ref 8.6–10.2)
CHLORIDE BLD-SCNC: 104 MMOL/L (ref 98–107)
CHOLESTEROL, TOTAL: 136 MG/DL (ref 0–199)
CO2: 26 MMOL/L (ref 22–29)
CREAT SERPL-MCNC: 0.8 MG/DL (ref 0.5–1)
EOSINOPHILS ABSOLUTE: 0.24 E9/L (ref 0.05–0.5)
EOSINOPHILS RELATIVE PERCENT: 5.8 % (ref 0–6)
GFR AFRICAN AMERICAN: >60
GFR NON-AFRICAN AMERICAN: >60 ML/MIN/1.73
GLUCOSE BLD-MCNC: 85 MG/DL (ref 74–99)
HCT VFR BLD CALC: 40.2 % (ref 34–48)
HDLC SERPL-MCNC: 57 MG/DL
HEMOGLOBIN: 14.1 G/DL (ref 11.5–15.5)
IMMATURE GRANULOCYTES #: 0.01 E9/L
IMMATURE GRANULOCYTES %: 0.2 % (ref 0–5)
LDL CHOLESTEROL CALCULATED: 61 MG/DL (ref 0–99)
LYMPHOCYTES ABSOLUTE: 1.37 E9/L (ref 1.5–4)
LYMPHOCYTES RELATIVE PERCENT: 33.3 % (ref 20–42)
MCH RBC QN AUTO: 30 PG (ref 26–35)
MCHC RBC AUTO-ENTMCNC: 35.1 % (ref 32–34.5)
MCV RBC AUTO: 85.5 FL (ref 80–99.9)
MONOCYTES ABSOLUTE: 0.39 E9/L (ref 0.1–0.95)
MONOCYTES RELATIVE PERCENT: 9.5 % (ref 2–12)
NEUTROPHILS ABSOLUTE: 2.06 E9/L (ref 1.8–7.3)
NEUTROPHILS RELATIVE PERCENT: 50.2 % (ref 43–80)
PDW BLD-RTO: 12 FL (ref 11.5–15)
PLATELET # BLD: 135 E9/L (ref 130–450)
PMV BLD AUTO: 11.5 FL (ref 7–12)
POTASSIUM SERPL-SCNC: 3.7 MMOL/L (ref 3.5–5)
RBC # BLD: 4.7 E12/L (ref 3.5–5.5)
RBC # BLD: NORMAL 10*6/UL
SODIUM BLD-SCNC: 138 MMOL/L (ref 132–146)
TOTAL PROTEIN: 6.3 G/DL (ref 6.4–8.3)
TRIGL SERPL-MCNC: 92 MG/DL (ref 0–149)
VLDLC SERPL CALC-MCNC: 18 MG/DL
WBC # BLD: 4.1 E9/L (ref 4.5–11.5)

## 2022-06-18 PROCEDURE — 80053 COMPREHEN METABOLIC PANEL: CPT

## 2022-06-18 PROCEDURE — 85025 COMPLETE CBC W/AUTO DIFF WBC: CPT

## 2022-06-18 PROCEDURE — 36415 COLL VENOUS BLD VENIPUNCTURE: CPT

## 2022-06-18 PROCEDURE — 80061 LIPID PANEL: CPT

## 2022-06-27 ENCOUNTER — OFFICE VISIT (OUTPATIENT)
Dept: FAMILY MEDICINE CLINIC | Age: 62
End: 2022-06-27
Payer: COMMERCIAL

## 2022-06-27 VITALS
DIASTOLIC BLOOD PRESSURE: 70 MMHG | BODY MASS INDEX: 34.61 KG/M2 | TEMPERATURE: 97 F | WEIGHT: 221 LBS | OXYGEN SATURATION: 97 % | SYSTOLIC BLOOD PRESSURE: 120 MMHG | HEART RATE: 71 BPM

## 2022-06-27 DIAGNOSIS — M54.50 CHRONIC LOW BACK PAIN WITHOUT SCIATICA, UNSPECIFIED BACK PAIN LATERALITY: Primary | ICD-10-CM

## 2022-06-27 DIAGNOSIS — I10 HTN (HYPERTENSION), BENIGN: ICD-10-CM

## 2022-06-27 DIAGNOSIS — R94.5 ABNORMAL LIVER FUNCTION: ICD-10-CM

## 2022-06-27 DIAGNOSIS — E87.6 HYPOKALEMIA: ICD-10-CM

## 2022-06-27 DIAGNOSIS — E78.2 MIXED HYPERLIPIDEMIA: ICD-10-CM

## 2022-06-27 DIAGNOSIS — G89.29 CHRONIC LOW BACK PAIN WITHOUT SCIATICA, UNSPECIFIED BACK PAIN LATERALITY: Primary | ICD-10-CM

## 2022-06-27 DIAGNOSIS — M19.90 ARTHRITIS: ICD-10-CM

## 2022-06-27 PROCEDURE — 99214 OFFICE O/P EST MOD 30 MIN: CPT | Performed by: FAMILY MEDICINE

## 2022-06-27 RX ORDER — SIMVASTATIN 10 MG
10 TABLET ORAL NIGHTLY
Qty: 90 TABLET | Refills: 1 | Status: SHIPPED
Start: 2022-06-27 | End: 2022-08-08 | Stop reason: SDUPTHER

## 2022-06-27 RX ORDER — POTASSIUM CHLORIDE 750 MG/1
TABLET, FILM COATED, EXTENDED RELEASE ORAL
Qty: 180 TABLET | Refills: 1 | Status: SHIPPED
Start: 2022-06-27 | End: 2022-08-08 | Stop reason: SDUPTHER

## 2022-06-27 RX ORDER — TRIAMTERENE AND HYDROCHLOROTHIAZIDE 37.5; 25 MG/1; MG/1
TABLET ORAL
Qty: 90 TABLET | Refills: 1 | Status: SHIPPED
Start: 2022-06-27 | End: 2022-08-08 | Stop reason: SDUPTHER

## 2022-06-27 SDOH — ECONOMIC STABILITY: FOOD INSECURITY: WITHIN THE PAST 12 MONTHS, THE FOOD YOU BOUGHT JUST DIDN'T LAST AND YOU DIDN'T HAVE MONEY TO GET MORE.: NEVER TRUE

## 2022-06-27 SDOH — ECONOMIC STABILITY: FOOD INSECURITY: WITHIN THE PAST 12 MONTHS, YOU WORRIED THAT YOUR FOOD WOULD RUN OUT BEFORE YOU GOT MONEY TO BUY MORE.: NEVER TRUE

## 2022-06-27 ASSESSMENT — ENCOUNTER SYMPTOMS
RESPIRATORY NEGATIVE: 1
EYES NEGATIVE: 1
ALLERGIC/IMMUNOLOGIC NEGATIVE: 1
BACK PAIN: 1
GASTROINTESTINAL NEGATIVE: 1

## 2022-06-27 ASSESSMENT — SOCIAL DETERMINANTS OF HEALTH (SDOH): HOW HARD IS IT FOR YOU TO PAY FOR THE VERY BASICS LIKE FOOD, HOUSING, MEDICAL CARE, AND HEATING?: NOT HARD AT ALL

## 2022-06-27 NOTE — PATIENT INSTRUCTIONS
Continue same treatment  Low-sodium low-fat diet  Discontinue alcohol  Regular exercises  Lab work before the next visit  Return to clinic earlier if any problems

## 2022-06-27 NOTE — PROGRESS NOTES
OFFICE PROGRESS NOTE      SUBJECTIVE:        Patient ID:   Gilda Ricardo is a 64 y.o. female who presents for   Chief Complaint   Patient presents with    Hypertension     Here for recheck on Hypertension and Hyperlipidemia. Lab work done,here for review. HPI:   Patient here for the follow-up  States that she got lot of arthritic pain in the left hip  Has been started on Mobic by the orthopedic surgeon  Lab work reviewed  Lipids are normal  Borderline liver enzyme elevation  Patient states she is trying to follow the diet  Diet drink beer        Prior to Visit Medications    Medication Sig Taking?  Authorizing Provider   triamterene-hydroCHLOROthiazide (MAXZIDE-25) 37.5-25 MG per tablet TAKE ONE TABLET BY MOUTH DAILY  Sarah Calderon MD   simvastatin (ZOCOR) 10 MG tablet TAKE 1 TABLET BY MOUTH NIGHTLY  Sarah Calderon MD   potassium chloride (KLOR-CON) 10 MEQ extended release tablet TAKE ONE TABLET BY MOUTH TWO TIMES A DAY  Sarah Calderon MD   naproxen sodium (ALEVE) 220 MG tablet Take 220 mg by mouth 2 times daily (with meals)  Historical Provider, MD   methocarbamol (ROBAXIN) 750 MG tablet Take 1 tablet by mouth 2 times daily as needed (for pain (may cause drowsiness))  Bari Hernandez PA-C   Coenzyme Q10 (COQ10) 200 MG CAPS Take by mouth daily  Historical Provider, MD   Pyridoxine HCl (VITAMIN B-6 PO) Take by mouth daily  Historical Provider, MD      Social History     Socioeconomic History    Marital status:      Spouse name: None    Number of children: None    Years of education: None    Highest education level: None   Occupational History    None   Tobacco Use    Smoking status: Former Smoker     Packs/day: 0.30     Years: 15.00     Pack years: 4.50     Types: Cigarettes     Quit date: 1989     Years since quittin.4    Smokeless tobacco: Never Used   Vaping Use    Vaping Use: Never used   Substance and Sexual Activity    Alcohol use: Yes     Comment: occasional beer or wine    Drug use: No    Sexual activity: Yes     Partners: Male   Other Topics Concern    None   Social History Narrative    None     Social Determinants of Health     Financial Resource Strain: Low Risk     Difficulty of Paying Living Expenses: Not hard at all   Food Insecurity: No Food Insecurity    Worried About Running Out of Food in the Last Year: Never true    Jonathan of Food in the Last Year: Never true   Transportation Needs:     Lack of Transportation (Medical): Not on file    Lack of Transportation (Non-Medical): Not on file   Physical Activity:     Days of Exercise per Week: Not on file    Minutes of Exercise per Session: Not on file   Stress:     Feeling of Stress : Not on file   Social Connections:     Frequency of Communication with Friends and Family: Not on file    Frequency of Social Gatherings with Friends and Family: Not on file    Attends [employer] Services: Not on file    Active Member of [address] [place] [address] or Organizations: Not on file    Attends Club or Organization Meetings: Not on file    Marital Status: Not on file   Intimate Partner Violence:     Fear of Current or Ex-Partner: Not on file    Emotionally Abused: Not on file    Physically Abused: Not on file    Sexually Abused: Not on file   Housing Stability:     Unable to Pay for Housing in the Last Year: Not on file    Number of [place] in the Last Year: Not on file    Unstable Housing in the Last Year: Not on file       I have reviewed Dayanna's allergies, medications, problem list, medical, social and family history and have updated as needed in the electronic medical record  Review Of Systems:    Review of Systems   Constitutional: Negative. HENT: Negative. Eyes: Negative. Respiratory: Negative. Cardiovascular: Negative. Gastrointestinal: Negative. Endocrine: Negative. Musculoskeletal: Positive for arthralgias and back pain. Skin: Negative. Allergic/Immunologic: Negative. Neurological: Negative. Hematological: Negative. Psychiatric/Behavioral: Negative. OBJECTIVE:     VS:  Wt Readings from Last 3 Encounters:   06/27/22 221 lb (100.2 kg)   03/28/22 227 lb (103 kg)   01/31/22 216 lb (98 kg)     Temp Readings from Last 3 Encounters:   06/27/22 97 °F (36.1 °C) (Infrared)   03/28/22 97.1 °F (36.2 °C) (Infrared)   01/31/22 96.6 °F (35.9 °C) (Infrared)     BP Readings from Last 3 Encounters:   06/27/22 120/70   03/28/22 138/76   01/31/22 134/63        Physical Exam  Constitutional:       Appearance: She is well-developed. HENT:      Head: Normocephalic and atraumatic. Eyes:      Conjunctiva/sclera: Conjunctivae normal.      Pupils: Pupils are equal, round, and reactive to light. Cardiovascular:      Rate and Rhythm: Normal rate and regular rhythm. Heart sounds: Normal heart sounds. Pulmonary:      Effort: Pulmonary effort is normal.      Breath sounds: Normal breath sounds. Abdominal:      General: Bowel sounds are normal.      Palpations: Abdomen is soft. Musculoskeletal:         General: Tenderness (Hip joint) present. Normal range of motion. Cervical back: Normal range of motion and neck supple. Skin:     General: Skin is warm and dry. Neurological:      Mental Status: She is alert and oriented to person, place, and time. Psychiatric:         Thought Content:  Thought content normal.            Labs :    Lab Results   Component Value Date    WBC 4.1 (L) 06/18/2022    HGB 14.1 06/18/2022    HCT 40.2 06/18/2022     06/18/2022    CHOL 136 06/18/2022    TRIG 92 06/18/2022    HDL 57 06/18/2022    ALT 23 06/18/2022    AST 32 (H) 06/18/2022     06/18/2022    K 3.7 06/18/2022     06/18/2022    CREATININE 0.8 06/18/2022    BUN 18 06/18/2022    CO2 26 06/18/2022    TSH 1.010 03/11/2017    INR 0.9 03/10/2017    GLUF 100 (H) 03/17/2019     Lab Results   Component Value Date    COLORU Yellow 02/08/2020    NITRU Negative 02/08/2020    GLUCOSEU Negative 02/08/2020    KETUA Negative 02/08/2020    UROBILINOGEN 0.2 02/08/2020    BILIRUBINUR Negative 02/08/2020     No results found for: PSA, CEA, , CC6087,       Controlled Substances Monitoring:                                    ASSESSMENT     Patient Active Problem List    Diagnosis Date Noted    Back pain 03/05/2018    HTN (hypertension), benign 03/10/2017    Hyperlipemia 03/10/2017    Hypokalemia 03/10/2017        Diagnosis:     ICD-10-CM    1. Chronic low back pain without sciatica, unspecified back pain laterality  M54.50     G89.29    2. HTN (hypertension), benign  I10 triamterene-hydroCHLOROthiazide (MAXZIDE-25) 37.5-25 MG per tablet     Comprehensive Metabolic Panel   3. Mixed hyperlipidemia  E78.2 simvastatin (ZOCOR) 10 MG tablet     Comprehensive Metabolic Panel     LIPID PANEL   4. Hypokalemia  E87.6 potassium chloride (KLOR-CON) 10 MEQ extended release tablet     Comprehensive Metabolic Panel   5. Arthritis  M19.90 CBC with Auto Differential   6. Abnormal liver function  R94.5 CBC with Auto Differential     Comprehensive Metabolic Panel       PLAN:   Continue present treatment  Low-sodium low-fat diet  Discontinue alcohol  Regular exercises  Return to clinic earlier if any problems        Patient Instructions   Continue same treatment  Low-sodium low-fat diet  Discontinue alcohol  Regular exercises  Lab work before the next visit  Return to clinic earlier if any problems      Return in about 6 weeks (around 8/8/2022) for Medication Check, test results. Eva Espinoza reviewed my findings and recommendations with Martin Boo     Electronicallysigned by Lea Santos MD on 6/27/22 at 4:33 PM EDT

## 2022-07-23 ENCOUNTER — HOSPITAL ENCOUNTER (OUTPATIENT)
Age: 62
Discharge: HOME | End: 2022-07-23
Payer: COMMERCIAL

## 2022-07-23 DIAGNOSIS — E78.2 MIXED HYPERLIPIDEMIA: ICD-10-CM

## 2022-07-23 DIAGNOSIS — R94.5 ABNORMAL LIVER FUNCTION: ICD-10-CM

## 2022-07-23 DIAGNOSIS — E87.6 HYPOKALEMIA: ICD-10-CM

## 2022-07-23 DIAGNOSIS — M19.90 ARTHRITIS: ICD-10-CM

## 2022-07-23 DIAGNOSIS — I10 HTN (HYPERTENSION), BENIGN: ICD-10-CM

## 2022-07-23 LAB
ALBUMIN SERPL-MCNC: 4.3 G/DL (ref 3.5–5.2)
ALP BLD-CCNC: 67 U/L (ref 35–104)
ALT SERPL-CCNC: 20 U/L (ref 0–32)
ANION GAP SERPL CALCULATED.3IONS-SCNC: 12 MMOL/L (ref 7–16)
AST SERPL-CCNC: 24 U/L (ref 0–31)
BASOPHILS ABSOLUTE: 0.03 E9/L (ref 0–0.2)
BASOPHILS RELATIVE PERCENT: 0.7 % (ref 0–2)
BILIRUB SERPL-MCNC: 1.2 MG/DL (ref 0–1.2)
BUN BLDV-MCNC: 19 MG/DL (ref 6–23)
CALCIUM SERPL-MCNC: 9.4 MG/DL (ref 8.6–10.2)
CHLORIDE BLD-SCNC: 103 MMOL/L (ref 98–107)
CHOLESTEROL, TOTAL: 171 MG/DL (ref 0–199)
CO2: 26 MMOL/L (ref 22–29)
CREAT SERPL-MCNC: 0.8 MG/DL (ref 0.5–1)
EOSINOPHILS ABSOLUTE: 0.12 E9/L (ref 0.05–0.5)
EOSINOPHILS RELATIVE PERCENT: 2.7 % (ref 0–6)
GFR AFRICAN AMERICAN: >60
GFR NON-AFRICAN AMERICAN: >60 ML/MIN/1.73
GLUCOSE BLD-MCNC: 98 MG/DL (ref 74–99)
HCT VFR BLD CALC: 41.4 % (ref 34–48)
HDLC SERPL-MCNC: 57 MG/DL
HEMOGLOBIN: 14.4 G/DL (ref 11.5–15.5)
IMMATURE GRANULOCYTES #: 0.01 E9/L
IMMATURE GRANULOCYTES %: 0.2 % (ref 0–5)
LDL CHOLESTEROL CALCULATED: 96 MG/DL (ref 0–99)
LYMPHOCYTES ABSOLUTE: 1.19 E9/L (ref 1.5–4)
LYMPHOCYTES RELATIVE PERCENT: 26.7 % (ref 20–42)
MCH RBC QN AUTO: 29.5 PG (ref 26–35)
MCHC RBC AUTO-ENTMCNC: 34.8 % (ref 32–34.5)
MCV RBC AUTO: 84.8 FL (ref 80–99.9)
MONOCYTES ABSOLUTE: 0.32 E9/L (ref 0.1–0.95)
MONOCYTES RELATIVE PERCENT: 7.2 % (ref 2–12)
NEUTROPHILS ABSOLUTE: 2.78 E9/L (ref 1.8–7.3)
NEUTROPHILS RELATIVE PERCENT: 62.5 % (ref 43–80)
PDW BLD-RTO: 11.9 FL (ref 11.5–15)
PLATELET # BLD: 148 E9/L (ref 130–450)
PMV BLD AUTO: 11.6 FL (ref 7–12)
POTASSIUM SERPL-SCNC: 3.9 MMOL/L (ref 3.5–5)
RBC # BLD: 4.88 E12/L (ref 3.5–5.5)
SODIUM BLD-SCNC: 141 MMOL/L (ref 132–146)
TOTAL PROTEIN: 6.7 G/DL (ref 6.4–8.3)
TRIGL SERPL-MCNC: 89 MG/DL (ref 0–149)
VLDLC SERPL CALC-MCNC: 18 MG/DL
WBC # BLD: 4.5 E9/L (ref 4.5–11.5)

## 2022-07-23 PROCEDURE — 80061 LIPID PANEL: CPT

## 2022-07-23 PROCEDURE — 36415 COLL VENOUS BLD VENIPUNCTURE: CPT

## 2022-07-23 PROCEDURE — 80053 COMPREHEN METABOLIC PANEL: CPT

## 2022-07-23 PROCEDURE — 85025 COMPLETE CBC W/AUTO DIFF WBC: CPT

## 2022-08-08 ENCOUNTER — OFFICE VISIT (OUTPATIENT)
Dept: FAMILY MEDICINE CLINIC | Age: 62
End: 2022-08-08
Payer: COMMERCIAL

## 2022-08-08 VITALS
OXYGEN SATURATION: 99 % | DIASTOLIC BLOOD PRESSURE: 78 MMHG | BODY MASS INDEX: 34.3 KG/M2 | SYSTOLIC BLOOD PRESSURE: 122 MMHG | WEIGHT: 219 LBS | HEART RATE: 74 BPM | TEMPERATURE: 97.1 F

## 2022-08-08 DIAGNOSIS — G89.29 CHRONIC LOW BACK PAIN WITHOUT SCIATICA, UNSPECIFIED BACK PAIN LATERALITY: ICD-10-CM

## 2022-08-08 DIAGNOSIS — E87.6 HYPOKALEMIA: ICD-10-CM

## 2022-08-08 DIAGNOSIS — I10 HTN (HYPERTENSION), BENIGN: Primary | ICD-10-CM

## 2022-08-08 DIAGNOSIS — M54.50 CHRONIC LOW BACK PAIN WITHOUT SCIATICA, UNSPECIFIED BACK PAIN LATERALITY: ICD-10-CM

## 2022-08-08 DIAGNOSIS — E78.2 MIXED HYPERLIPIDEMIA: ICD-10-CM

## 2022-08-08 DIAGNOSIS — M19.90 ARTHRITIS: ICD-10-CM

## 2022-08-08 PROCEDURE — 99214 OFFICE O/P EST MOD 30 MIN: CPT | Performed by: FAMILY MEDICINE

## 2022-08-08 RX ORDER — SIMVASTATIN 10 MG
10 TABLET ORAL NIGHTLY
Qty: 90 TABLET | Refills: 1 | Status: SHIPPED | OUTPATIENT
Start: 2022-08-08 | End: 2023-10-03

## 2022-08-08 RX ORDER — POTASSIUM CHLORIDE 750 MG/1
TABLET, FILM COATED, EXTENDED RELEASE ORAL
Qty: 180 TABLET | Refills: 1 | Status: SHIPPED
Start: 2022-08-08 | End: 2022-10-03

## 2022-08-08 RX ORDER — TRIAMTERENE AND HYDROCHLOROTHIAZIDE 37.5; 25 MG/1; MG/1
TABLET ORAL
Qty: 90 TABLET | Refills: 1 | Status: SHIPPED | OUTPATIENT
Start: 2022-08-08

## 2022-08-08 NOTE — PROGRESS NOTES
OFFICE PROGRESS NOTE      SUBJECTIVE:        Patient ID:   Colleen Martins is a 58 y.o. female who presents for   Chief Complaint   Patient presents with    Hypertension     Here for recheck on Hypertension. Patient reports experiencing cramping in hands and legs. Lab work done,here for review. HPI:   Patient here for the follow-up  Feeling well  Lab work within normal limits  Continue medication as prescribed  Does have arthritic pains  Blood pressure is stable        Prior to Visit Medications    Medication Sig Taking?  Authorizing Provider   triamterene-hydroCHLOROthiazide (MAXZIDE-25) 37.5-25 MG per tablet TAKE ONE TABLET BY MOUTH DAILY  India Juan MD   simvastatin (ZOCOR) 10 MG tablet Take 1 tablet by mouth nightly  India Juan MD   potassium chloride (KLOR-CON) 10 MEQ extended release tablet TAKE ONE TABLET BY MOUTH TWO TIMES A DAY  India Juan MD   naproxen sodium (ALEVE) 220 MG tablet Take 220 mg by mouth 2 times daily (with meals)  Historical Provider, MD   methocarbamol (ROBAXIN) 750 MG tablet Take 1 tablet by mouth 2 times daily as needed (for pain (may cause drowsiness))  Bari Hernandez PA-C   Coenzyme Q10 (COQ10) 200 MG CAPS Take by mouth daily  Historical Provider, MD   Pyridoxine HCl (VITAMIN B-6 PO) Take by mouth daily  Historical Provider, MD      Social History     Socioeconomic History    Marital status:      Spouse name: None    Number of children: None    Years of education: None    Highest education level: None   Tobacco Use    Smoking status: Former     Packs/day: 0.30     Years: 15.00     Pack years: 4.50     Types: Cigarettes     Quit date: 1989     Years since quittin.5    Smokeless tobacco: Never   Vaping Use    Vaping Use: Never used   Substance and Sexual Activity    Alcohol use: Yes     Comment: occasional beer or wine    Drug use: No    Sexual activity: Yes     Partners: Male     Social Determinants of Health     Financial Resource Strain: Low Risk     Difficulty of Paying Living Expenses: Not hard at all   Food Insecurity: No Food Insecurity    Worried About Running Out of Food in the Last Year: Never true    Ran Out of Food in the Last Year: Never true       I have reviewed Dayanna's allergies, medications, problem list, medical, social and family history and have updated as needed in the electronic medical record  Review Of Systems:    Review of Systems   Constitutional: Negative. HENT: Negative. Eyes: Negative. Respiratory: Negative. Cardiovascular: Negative. Gastrointestinal: Negative. Endocrine: Negative. Musculoskeletal:  Positive for arthralgias. Skin: Negative. Allergic/Immunologic: Negative. Neurological: Negative. Hematological: Negative. Psychiatric/Behavioral: Negative. OBJECTIVE:     VS:  Wt Readings from Last 3 Encounters:   08/08/22 219 lb (99.3 kg)   06/27/22 221 lb (100.2 kg)   03/28/22 227 lb (103 kg)     Temp Readings from Last 3 Encounters:   08/08/22 97.1 °F (36.2 °C) (Infrared)   06/27/22 97 °F (36.1 °C) (Infrared)   03/28/22 97.1 °F (36.2 °C) (Infrared)     BP Readings from Last 3 Encounters:   08/08/22 122/78   06/27/22 120/70   03/28/22 138/76        Physical Exam  Constitutional:       Appearance: She is well-developed. HENT:      Head: Normocephalic and atraumatic. Eyes:      Conjunctiva/sclera: Conjunctivae normal.      Pupils: Pupils are equal, round, and reactive to light. Cardiovascular:      Rate and Rhythm: Normal rate and regular rhythm. Heart sounds: Normal heart sounds. Pulmonary:      Effort: Pulmonary effort is normal.      Breath sounds: Normal breath sounds. Abdominal:      General: Bowel sounds are normal.      Palpations: Abdomen is soft. Musculoskeletal:         General: Normal range of motion. Cervical back: Normal range of motion and neck supple. Skin:     General: Skin is warm and dry. the next visit  Follow with orthopedic surgeon  Return to clinic earlier if any problems        Patient Instructions   Continue present treatment  Low-sodium low-fat diet  Lab work before the next visit  Follow-up with orthopedic surgeon  Return to clinic earlier if any problems    Return in about 3 months (around 11/8/2022) for Medication Check, test results. Vel Jimenez reviewed my findings and recommendations with Christopher Motta     Electronicallysigned by Tomer Arrington MD on 8/8/22 at 4:10 PM EDT

## 2022-08-08 NOTE — PATIENT INSTRUCTIONS
Continue present treatment  Low-sodium low-fat diet  Lab work before the next visit  Follow-up with orthopedic surgeon  Return to clinic earlier if any problems

## 2022-10-03 ENCOUNTER — OFFICE VISIT (OUTPATIENT)
Dept: FAMILY MEDICINE CLINIC | Age: 62
End: 2022-10-03
Payer: COMMERCIAL

## 2022-10-03 VITALS
TEMPERATURE: 97 F | OXYGEN SATURATION: 97 % | SYSTOLIC BLOOD PRESSURE: 120 MMHG | HEIGHT: 68 IN | BODY MASS INDEX: 32.89 KG/M2 | HEART RATE: 73 BPM | DIASTOLIC BLOOD PRESSURE: 72 MMHG | WEIGHT: 217 LBS

## 2022-10-03 DIAGNOSIS — M19.90 ARTHRITIS: ICD-10-CM

## 2022-10-03 DIAGNOSIS — I10 HTN (HYPERTENSION), BENIGN: ICD-10-CM

## 2022-10-03 DIAGNOSIS — E78.2 MIXED HYPERLIPIDEMIA: ICD-10-CM

## 2022-10-03 DIAGNOSIS — Z01.818 PREOP EXAMINATION: Primary | ICD-10-CM

## 2022-10-03 PROCEDURE — 99214 OFFICE O/P EST MOD 30 MIN: CPT | Performed by: FAMILY MEDICINE

## 2022-10-03 NOTE — PROGRESS NOTES
OFFICE PROGRESS NOTE      SUBJECTIVE:        Patient ID:   Stephany Flores is a 58 y.o. female who presents for   Chief Complaint   Patient presents with    Pre-op Exam     Here for pre-op exam for LT hip replacement on 10-18 at [facility] under Dr Esme Villela. HPI:   Patient here for preop for operation clearance  Patient states he is feeling okay  Lab work reviewed which is normal  EKG is normal        Prior to Visit Medications    Medication Sig Taking? Authorizing Provider   triamterene-hydroCHLOROthiazide (MAXZIDE-25) 37.5-25 MG per tablet TAKE ONE TABLET BY MOUTH DAILY Yes Samantha Petty MD   simvastatin (ZOCOR) 10 MG tablet Take 1 tablet by mouth nightly Yes Samantha Petty MD   potassium chloride (KLOR-CON) 10 MEQ extended release tablet TAKE ONE TABLET BY MOUTH TWO TIMES A DAY Yes Samantha Petty MD   methocarbamol (ROBAXIN) 750 MG tablet Take 1 tablet by mouth 2 times daily as needed (for pain (may cause drowsiness)) Yes Bari Hernandez PA-C   naproxen sodium (ANAPROX) 220 MG tablet Take 220 mg by mouth 2 times daily (with meals)  Patient not taking: Reported on 10/3/2022  Historical Provider, MD   Coenzyme Q10 (COQ10) 200 MG CAPS Take by mouth daily  Patient not taking: Reported on 10/3/2022  Historical Provider, MD   Pyridoxine HCl (VITAMIN B-6 PO) Take by mouth daily  Patient not taking: Reported on 10/3/2022  Historical Provider, MD      Social History     Socioeconomic History    Marital status:      Spouse name: None    Number of children: None    Years of education: None    Highest education level: None   Tobacco Use    Smoking status: Former     Packs/day: 0.30     Years: 15.00     Pack years: 4.50     Types: Cigarettes     Quit date: 1989     Years since quittin.6    Smokeless tobacco: Never   Vaping Use    Vaping Use: Never used   Substance and Sexual Activity    Alcohol use: Yes     Comment: occasional beer or wine    Drug use:  No Sexual activity: Yes     Partners: Male     Social Determinants of Health     Financial Resource Strain: Low Risk     Difficulty of Paying Living Expenses: Not hard at all   Food Insecurity: No Food Insecurity    Worried About Running Out of Food in the Last Year: Never true    Ran Out of Food in the Last Year: Never true       I have reviewed Dayanna's allergies, medications, problem list, medical, social and family history and have updated as needed in the electronic medical record  Review Of Systems:    Review of Systems   Constitutional: Negative. HENT: Negative. Eyes: Negative. Respiratory: Negative. Cardiovascular: Negative. Gastrointestinal: Negative. Endocrine: Negative. Musculoskeletal:  Positive for arthralgias. Skin: Negative. Allergic/Immunologic: Negative. Neurological: Negative. Hematological: Negative. Psychiatric/Behavioral: Negative. OBJECTIVE:     VS:  Wt Readings from Last 3 Encounters:   10/03/22 217 lb (98.4 kg)   08/08/22 219 lb (99.3 kg)   06/27/22 221 lb (100.2 kg)     Temp Readings from Last 3 Encounters:   10/03/22 97 °F (36.1 °C) (Infrared)   08/08/22 97.1 °F (36.2 °C) (Infrared)   06/27/22 97 °F (36.1 °C) (Infrared)     BP Readings from Last 3 Encounters:   10/03/22 120/72   08/08/22 122/78   06/27/22 120/70        Physical Exam  Constitutional:       Appearance: She is well-developed. HENT:      Head: Normocephalic and atraumatic. Eyes:      Conjunctiva/sclera: Conjunctivae normal.      Pupils: Pupils are equal, round, and reactive to light. Cardiovascular:      Rate and Rhythm: Normal rate and regular rhythm. Heart sounds: Normal heart sounds. Pulmonary:      Effort: Pulmonary effort is normal.      Breath sounds: Normal breath sounds. Abdominal:      General: Bowel sounds are normal.      Palpations: Abdomen is soft. Musculoskeletal:         General: Normal range of motion.       Cervical back: Normal range of motion and neck supple. Skin:     General: Skin is warm and dry. Neurological:      Mental Status: She is alert and oriented to person, place, and time. Psychiatric:         Thought Content: Thought content normal.          Labs :    Lab Results   Component Value Date    WBC 4.5 07/23/2022    HGB 14.4 07/23/2022    HCT 41.4 07/23/2022     07/23/2022    CHOL 171 07/23/2022    TRIG 89 07/23/2022    HDL 57 07/23/2022    ALT 20 07/23/2022    AST 24 07/23/2022     07/23/2022    K 3.9 07/23/2022     07/23/2022    CREATININE 0.8 07/23/2022    BUN 19 07/23/2022    CO2 26 07/23/2022    TSH 1.010 03/11/2017    INR 0.9 03/10/2017    GLUF 100 (H) 03/17/2019     Lab Results   Component Value Date/Time    COLORU Yellow 02/08/2020 10:05 AM    NITRU Negative 02/08/2020 10:05 AM    GLUCOSEU Negative 02/08/2020 10:05 AM    KETUA Negative 02/08/2020 10:05 AM    UROBILINOGEN 0.2 02/08/2020 10:05 AM    BILIRUBINUR Negative 02/08/2020 10:05 AM     No results found for: PSA, CEA, , OF1379,       Controlled Substances Monitoring:                                    ASSESSMENT     Patient Active Problem List    Diagnosis Date Noted    Back pain 03/05/2018    HTN (hypertension), benign 03/10/2017    Hyperlipemia 03/10/2017    Hypokalemia 03/10/2017        Diagnosis:     ICD-10-CM    1. Preop examination  Z01.818       2. HTN (hypertension), benign  I10       3. Arthritis  M19.90       4. Mixed hyperlipidemia  E78.2           PLAN:   Stop taking potassium supplement  Taking the NSAIDs  Continue the medication  Low-sodium low-fat diet  Average risk for the surgery  Return to clinic at her regular appointment        Patient Instructions   Continue present treatment  You have an average risk for the surgery  Return to clinic earlier if any problems    Return Regular appointment. Khushi Costa reviewed my findings and recommendations with Regino Hopper.     Electronicallysigned by Jose Pearson MD on 10/3/22 at 5:07 PM EDT

## 2022-10-03 NOTE — PATIENT INSTRUCTIONS
Continue present treatment  You have an average risk for the surgery  Return to clinic earlier if any problems

## 2022-11-11 ENCOUNTER — HOSPITAL ENCOUNTER (OUTPATIENT)
Age: 62
Discharge: HOME | End: 2022-11-11
Payer: COMMERCIAL

## 2022-11-11 DIAGNOSIS — E87.6 HYPOKALEMIA: ICD-10-CM

## 2022-11-11 DIAGNOSIS — M19.90 ARTHRITIS: ICD-10-CM

## 2022-11-11 DIAGNOSIS — E78.2 MIXED HYPERLIPIDEMIA: ICD-10-CM

## 2022-11-11 DIAGNOSIS — M54.50 CHRONIC LOW BACK PAIN WITHOUT SCIATICA, UNSPECIFIED BACK PAIN LATERALITY: ICD-10-CM

## 2022-11-11 DIAGNOSIS — G89.29 CHRONIC LOW BACK PAIN WITHOUT SCIATICA, UNSPECIFIED BACK PAIN LATERALITY: ICD-10-CM

## 2022-11-11 DIAGNOSIS — I10 HTN (HYPERTENSION), BENIGN: ICD-10-CM

## 2022-11-11 LAB
ALBUMIN SERPL-MCNC: 3.7 G/DL (ref 3.5–5.2)
ALP BLD-CCNC: 98 U/L (ref 35–104)
ALT SERPL-CCNC: 17 U/L (ref 0–32)
ANION GAP SERPL CALCULATED.3IONS-SCNC: 11 MMOL/L (ref 7–16)
AST SERPL-CCNC: 36 U/L (ref 0–31)
BASOPHILS ABSOLUTE: 0.03 E9/L (ref 0–0.2)
BASOPHILS RELATIVE PERCENT: 0.7 % (ref 0–2)
BILIRUB SERPL-MCNC: 0.7 MG/DL (ref 0–1.2)
BUN BLDV-MCNC: 13 MG/DL (ref 6–23)
CALCIUM SERPL-MCNC: 9.5 MG/DL (ref 8.6–10.2)
CHLORIDE BLD-SCNC: 105 MMOL/L (ref 98–107)
CHOLESTEROL, TOTAL: 155 MG/DL (ref 0–199)
CO2: 24 MMOL/L (ref 22–29)
CREAT SERPL-MCNC: 0.7 MG/DL (ref 0.5–1)
EOSINOPHILS ABSOLUTE: 0.05 E9/L (ref 0.05–0.5)
EOSINOPHILS RELATIVE PERCENT: 1.1 % (ref 0–6)
GFR SERPL CREATININE-BSD FRML MDRD: >60 ML/MIN/1.73
GLUCOSE BLD-MCNC: 98 MG/DL (ref 74–99)
HCT VFR BLD CALC: 36.4 % (ref 34–48)
HDLC SERPL-MCNC: 48 MG/DL
HEMOGLOBIN: 12.5 G/DL (ref 11.5–15.5)
IMMATURE GRANULOCYTES #: 0.01 E9/L
IMMATURE GRANULOCYTES %: 0.2 % (ref 0–5)
LDL CHOLESTEROL CALCULATED: 86 MG/DL (ref 0–99)
LYMPHOCYTES ABSOLUTE: 1.43 E9/L (ref 1.5–4)
LYMPHOCYTES RELATIVE PERCENT: 32.4 % (ref 20–42)
MCH RBC QN AUTO: 29.3 PG (ref 26–35)
MCHC RBC AUTO-ENTMCNC: 34.3 % (ref 32–34.5)
MCV RBC AUTO: 85.4 FL (ref 80–99.9)
MONOCYTES ABSOLUTE: 0.3 E9/L (ref 0.1–0.95)
MONOCYTES RELATIVE PERCENT: 6.8 % (ref 2–12)
NEUTROPHILS ABSOLUTE: 2.6 E9/L (ref 1.8–7.3)
NEUTROPHILS RELATIVE PERCENT: 58.8 % (ref 43–80)
PDW BLD-RTO: 12.3 FL (ref 11.5–15)
PLATELET # BLD: 249 E9/L (ref 130–450)
PMV BLD AUTO: 11.5 FL (ref 7–12)
POTASSIUM SERPL-SCNC: 3.9 MMOL/L (ref 3.5–5)
RBC # BLD: 4.26 E12/L (ref 3.5–5.5)
SODIUM BLD-SCNC: 140 MMOL/L (ref 132–146)
TOTAL PROTEIN: 6.4 G/DL (ref 6.4–8.3)
TRIGL SERPL-MCNC: 104 MG/DL (ref 0–149)
VLDLC SERPL CALC-MCNC: 21 MG/DL
WBC # BLD: 4.4 E9/L (ref 4.5–11.5)

## 2022-11-11 PROCEDURE — 80053 COMPREHEN METABOLIC PANEL: CPT

## 2022-11-11 PROCEDURE — 36415 COLL VENOUS BLD VENIPUNCTURE: CPT

## 2022-11-11 PROCEDURE — 80061 LIPID PANEL: CPT

## 2022-11-11 PROCEDURE — 85025 COMPLETE CBC W/AUTO DIFF WBC: CPT

## 2022-11-14 ENCOUNTER — OFFICE VISIT (OUTPATIENT)
Dept: FAMILY MEDICINE CLINIC | Age: 62
End: 2022-11-14
Payer: COMMERCIAL

## 2022-11-14 VITALS
HEART RATE: 88 BPM | BODY MASS INDEX: 32.54 KG/M2 | SYSTOLIC BLOOD PRESSURE: 124 MMHG | OXYGEN SATURATION: 98 % | DIASTOLIC BLOOD PRESSURE: 80 MMHG | WEIGHT: 214 LBS | TEMPERATURE: 97.4 F

## 2022-11-14 DIAGNOSIS — I10 HTN (HYPERTENSION), BENIGN: Primary | ICD-10-CM

## 2022-11-14 DIAGNOSIS — E78.2 MIXED HYPERLIPIDEMIA: ICD-10-CM

## 2022-11-14 DIAGNOSIS — M19.90 ARTHRITIS: ICD-10-CM

## 2022-11-14 PROCEDURE — 3078F DIAST BP <80 MM HG: CPT | Performed by: FAMILY MEDICINE

## 2022-11-14 PROCEDURE — 3074F SYST BP LT 130 MM HG: CPT | Performed by: FAMILY MEDICINE

## 2022-11-14 PROCEDURE — 99214 OFFICE O/P EST MOD 30 MIN: CPT | Performed by: FAMILY MEDICINE

## 2022-11-14 RX ORDER — POTASSIUM CHLORIDE 750 MG/1
TABLET, FILM COATED, EXTENDED RELEASE ORAL
COMMUNITY
Start: 2022-11-08

## 2022-11-14 RX ORDER — HYDROCODONE BITARTRATE AND ACETAMINOPHEN 10; 325 MG/1; MG/1
TABLET ORAL
COMMUNITY
Start: 2022-10-31

## 2022-11-14 ASSESSMENT — ENCOUNTER SYMPTOMS
GASTROINTESTINAL NEGATIVE: 1
EYES NEGATIVE: 1
ALLERGIC/IMMUNOLOGIC NEGATIVE: 1
RESPIRATORY NEGATIVE: 1

## 2022-11-14 NOTE — PROGRESS NOTES
OFFICE PROGRESS NOTE      SUBJECTIVE:        Patient ID:   Tana Navarro is a 58 y.o. female who presents for   Chief Complaint   Patient presents with    Hypertension     Here for recheck on Hypertension and Hyperlipidemia. Patient reports feeling well. Needs wellness form filled out for her insurance. Lab work done,here for review. HPI:   Patient here for the follow-up  States she is feeling good  Lab work is within normal limit  Has been going to rehab after the hip surgery  Has been taking medication as prescribed        Prior to Visit Medications    Medication Sig Taking?  Authorizing Provider   potassium chloride (KLOR-CON) 10 MEQ extended release tablet TAKE ONE TABLET BY MOUTH TWO TIMES A DAY Yes Historical Provider, MD   HYDROcodone-acetaminophen (NORCO)  MG per tablet TAKE ONE TABLET BY MOUTH EVERY 6 HOURS AS NEEDED for 7 days Yes Historical Provider, MD   triamterene-hydroCHLOROthiazide (MAXZIDE-25) 37.5-25 MG per tablet TAKE ONE TABLET BY MOUTH DAILY Yes Latesha Rdz MD   simvastatin (ZOCOR) 10 MG tablet Take 1 tablet by mouth nightly Yes Latesha Rdz MD   methocarbamol (ROBAXIN) 750 MG tablet Take 1 tablet by mouth 2 times daily as needed (for pain (may cause drowsiness)) Yes Wes , JORGE      Social History     Socioeconomic History    Marital status:      Spouse name: None    Number of children: None    Years of education: None    Highest education level: None   Tobacco Use    Smoking status: Former     Packs/day: 0.30     Years: 15.00     Pack years: 4.50     Types: Cigarettes     Quit date: 1989     Years since quittin.7    Smokeless tobacco: Never   Vaping Use    Vaping Use: Never used   Substance and Sexual Activity    Alcohol use: Yes     Comment: occasional beer or wine    Drug use: No    Sexual activity: Yes     Partners: Male     Social Determinants of Health     Financial Resource Strain: Low Risk Difficulty of Paying Living Expenses: Not hard at all   Food Insecurity: No Food Insecurity    Worried About Running Out of Food in the Last Year: Never true    Ran Out of Food in the Last Year: Never true       I have reviewed Dayanna's allergies, medications, problem list, medical, social and family history and have updated as needed in the electronic medical record  Review Of Systems:    Review of Systems   Constitutional: Negative. HENT: Negative. Eyes: Negative. Respiratory: Negative. Cardiovascular: Negative. Gastrointestinal: Negative. Endocrine: Negative. Musculoskeletal:  Positive for arthralgias. Skin: Negative. Allergic/Immunologic: Negative. Neurological: Negative. Hematological: Negative. Psychiatric/Behavioral: Negative. OBJECTIVE:     VS:  Wt Readings from Last 3 Encounters:   11/14/22 214 lb (97.1 kg)   10/03/22 217 lb (98.4 kg)   08/08/22 219 lb (99.3 kg)     Temp Readings from Last 3 Encounters:   11/14/22 97.4 °F (36.3 °C) (Infrared)   10/03/22 97 °F (36.1 °C) (Infrared)   08/08/22 97.1 °F (36.2 °C) (Infrared)     BP Readings from Last 3 Encounters:   11/14/22 124/80   10/03/22 120/72   08/08/22 122/78        Physical Exam  Constitutional:       Appearance: She is well-developed. HENT:      Head: Normocephalic and atraumatic. Eyes:      Conjunctiva/sclera: Conjunctivae normal.      Pupils: Pupils are equal, round, and reactive to light. Cardiovascular:      Rate and Rhythm: Normal rate and regular rhythm. Heart sounds: Normal heart sounds. Pulmonary:      Effort: Pulmonary effort is normal.      Breath sounds: Normal breath sounds. Abdominal:      General: Bowel sounds are normal.      Palpations: Abdomen is soft. Musculoskeletal:         General: Normal range of motion. Cervical back: Normal range of motion and neck supple. Skin:     General: Skin is warm and dry.    Neurological:      Mental Status: She is alert and oriented to person, place, and time. Psychiatric:         Thought Content: Thought content normal.          Labs :    Lab Results   Component Value Date    WBC 4.4 (L) 11/11/2022    HGB 12.5 11/11/2022    HCT 36.4 11/11/2022     11/11/2022    CHOL 155 11/11/2022    TRIG 104 11/11/2022    HDL 48 11/11/2022    ALT 17 11/11/2022    AST 36 (H) 11/11/2022     11/11/2022    K 3.9 11/11/2022     11/11/2022    CREATININE 0.7 11/11/2022    BUN 13 11/11/2022    CO2 24 11/11/2022    TSH 1.010 03/11/2017    INR 0.9 03/10/2017    GLUF 100 (H) 03/17/2019     Lab Results   Component Value Date/Time    COLORU Yellow 02/08/2020 10:05 AM    NITRU Negative 02/08/2020 10:05 AM    GLUCOSEU Negative 02/08/2020 10:05 AM    KETUA Negative 02/08/2020 10:05 AM    UROBILINOGEN 0.2 02/08/2020 10:05 AM    BILIRUBINUR Negative 02/08/2020 10:05 AM     No results found for: PSA, CEA, , XB4655,       Controlled Substances Monitoring:                                    ASSESSMENT     Patient Active Problem List    Diagnosis Date Noted    Back pain 03/05/2018    HTN (hypertension), benign 03/10/2017    Hyperlipemia 03/10/2017    Hypokalemia 03/10/2017        Diagnosis:     ICD-10-CM    1. HTN (hypertension), benign  I10 CBC with Auto Differential     Comprehensive Metabolic Panel      2. Arthritis  M19.90 CBC with Auto Differential     Comprehensive Metabolic Panel      3. Mixed hyperlipidemia  E78.2 CBC with Auto Differential     Comprehensive Metabolic Panel     Lipid Panel          PLAN:   Continue present treatment  Low-sodium low-fat diet  Regular exercises  Lab work before the next visit  Return to clinic earlier if any problems        Patient Instructions   Continue present treatment  Low-sodium low-fat diet  Regular exercises  Lab work before the next visit  Return to clinic earlier if any problems    Return in about 3 months (around 2/14/2023) for Medication Check, test results, diabetes check.          Bryan Batres reviewed my findings and recommendations with Matthew Lawson.     Electronicallysigned by Janessa Bowles MD on 11/14/22 at 4:23 PM EST

## 2023-01-09 ENCOUNTER — TELEPHONE (OUTPATIENT)
Dept: SURGERY | Age: 63
End: 2023-01-09

## 2023-01-09 ENCOUNTER — INITIAL CONSULT (OUTPATIENT)
Dept: SURGERY | Age: 63
End: 2023-01-09
Payer: COMMERCIAL

## 2023-01-09 VITALS
TEMPERATURE: 98.1 F | SYSTOLIC BLOOD PRESSURE: 134 MMHG | HEART RATE: 80 BPM | HEIGHT: 68 IN | DIASTOLIC BLOOD PRESSURE: 86 MMHG | BODY MASS INDEX: 32.43 KG/M2 | WEIGHT: 214 LBS

## 2023-01-09 DIAGNOSIS — L72.0 EPIDERMOID CYST OF SKIN OF BACK: Primary | ICD-10-CM

## 2023-01-09 PROBLEM — M79.89 SOFT TISSUE MASS: Status: ACTIVE | Noted: 2023-01-09

## 2023-01-09 PROCEDURE — 3075F SYST BP GE 130 - 139MM HG: CPT | Performed by: SURGERY

## 2023-01-09 PROCEDURE — 99204 OFFICE O/P NEW MOD 45 MIN: CPT | Performed by: SURGERY

## 2023-01-09 PROCEDURE — 3079F DIAST BP 80-89 MM HG: CPT | Performed by: SURGERY

## 2023-01-09 RX ORDER — SULFAMETHOXAZOLE AND TRIMETHOPRIM 800; 160 MG/1; MG/1
1 TABLET ORAL 2 TIMES DAILY
Qty: 20 TABLET | Refills: 0 | Status: SHIPPED | OUTPATIENT
Start: 2023-01-09 | End: 2023-01-19

## 2023-01-09 NOTE — PROGRESS NOTES
General Surgery History and Physical  [facility]    Patient's Name/Date of Birth: Keny Bush / 1960    Date: January 9, 2023     Surgeon: Lb Easley M.D.    PCP: Christiano Partida MD     Chief Complaint: soft tissue neoplasm of the back    HPI:   Keny Bush is a 58 y.o. female who presents for evaluation of soft tissue neoplasm of the back. Timing is constant, radiation to right midle back, alleviated by none and started 18 and severity is 7/10. Admits previous drainage, some pain. Denies similar in the past. No fever, chills. Denies previous at the same site. Would like to have removed. Has not been treated with antibiotics. Has had drained previously 2018. Patient Active Problem List   Diagnosis    HTN (hypertension), benign    Hyperlipemia    Hypokalemia    Back pain       Past Medical History:   Diagnosis Date    Back pain     H/O cardiovascular stress test 03/10/2017    lexiscan    Hyperlipidemia     Hypertension     PONV (postoperative nausea and vomiting)        Past Surgical History:   Procedure Laterality Date    BACK SURGERY  2009    CHOLECYSTECTOMY  1998    COLONOSCOPY      FOOT SURGERY Right     x3    FOOT SURGERY Left     x1    HIP SURGERY Left     HYSTERECTOMY (CERVIX STATUS UNKNOWN)      partial    OTHER SURGICAL HISTORY  02/24/2017    Excision of soft tissue neoplasm upper back    PARTIAL HYSTERECTOMY (CERVIX NOT REMOVED)  2003       Allergies   Allergen Reactions    Penicillins Hives       The patient has a family history that is negative for severe cardiovascular or respiratory issues, negative for reaction to anesthesia. Patient did not report any history of skin cancer in their mother or father. Time spent reviewing past medical, surgical, social and family history, vitals, nursing assessment and images. No changes from above documented history.     Social History     Socioeconomic History    Marital status:      Spouse name: Not on file    Number of children: Not on file    Years of education: Not on file    Highest education level: Not on file   Occupational History    Not on file   Tobacco Use    Smoking status: Former     Packs/day: 0.30     Years: 15.00     Pack years: 4.50     Types: Cigarettes     Quit date: 1989     Years since quittin.9    Smokeless tobacco: Never   Vaping Use    Vaping Use: Never used   Substance and Sexual Activity    Alcohol use: Yes     Comment: occasional beer or wine    Drug use: No    Sexual activity: Yes     Partners: Male   Other Topics Concern    Not on file   Social History Narrative    Not on file     Social Determinants of Health     Financial Resource Strain: Low Risk     Difficulty of Paying Living Expenses: Not hard at all   Food Insecurity: No Food Insecurity    Worried About Running Out of Food in the Last Year: Never true    Ran Out of Food in the Last Year: Never true   Transportation Needs: Not on file   Physical Activity: Not on file   Stress: Not on file   Social Connections: Not on file   Intimate Partner Violence: Not on file   Housing Stability: Not on file           Review of Systems  A complete 10 system review was performed and are otherwise negative unless mentioned in the above HPI. Specific negatives are listed below but may not include all those reviewed.     General ROS: negative obtundation, AMS  ENT ROS: negative rhinorrhea, epistaxis  Allergy and Immunology ROS: negative itchy/watery eyes or nasal congestion  Hematological and Lymphatic ROS: negative spontaneous bleeding or bruising  Endocrine ROS: negative  lethargy, mood swings, palpitations or polydipsia/polyuria  Respiratory ROS: negative sputum changes, stridor, tachypnea or wheezing  Cardiovascular ROS: negative for - loss of consciousness, murmur or orthopnea  Gastrointestinal ROS: negative for - hematochezia or hematemesis  Genito-Urinary ROS: negative for -  genital discharge or hematuria  Musculoskeletal ROS: negative for - focal weakness, gangrene  Psych/Neuro ROS: negative for - visual or auditory hallucinations, suicidal ideation    Physical exam:   /86   Pulse 80   Temp 98.1 °F (36.7 °C)   Ht 5' 8\" (1.727 m)   Wt 214 lb (97.1 kg)   BMI 32.54 kg/m²   General appearance:  NAD, appears stated age  Head: NCAT, PERRLA, EOMI, red conjunctiva  Neck: supple, no masses, trachea midline  Lungs: Equal chest rise bilateral, no retractions, no wheezing  Heart: Reg rate  Abdomen: soft, nontender, nondistended  Skin; soft tissue mass 2 cm located middle back, mobile, mildly tender, no drainage  Gu: no cva tenderness  Extremities: atraumatic, no focal motor deficits, no open wounds  Psych: No tremor, visual hallucinations      Radiology: I reviewed relevant abdominal imaging from this admission and that available in the EMR       Assessment:  Beth Benson is a 58 y.o. female with soft tissue neoplasm of the back, pain at the site with swelling, concerns for malignancy  Patient Active Problem List   Diagnosis    HTN (hypertension), benign    Hyperlipemia    Hypokalemia    Back pain         Plan:  We discussed the underlying nature of soft tissue masses including the proposal diagnosis of this specific mass as it relates to benign or malignant disease  We discussed the nonoperative management and monitoring as an option  We discussed antibiotic treatment mechanism of action of the proposed antibiotics  We discussed the nature of the surgery and described the nature of closure including sutures and glue  I independently reviewed all relative urgent care and medical specialist notes in the electronic medical record pertaining to the above procedure and any contributing factors that may affect surgical intervention relating to bleeding or cardiovascular tolerance of the procedure.   We discussed the possibility of wound dehiscence the aftercare that this may necessitate we discussed the ongoing need for surveillance of wound care as well as care after surgery of the incisions and/or wounds    OR for excision soft tissue neoplasm of back  Discussed the risk, benefits and alternatives of surgery including wound infections, bleeding, scar, seroma, hematoma and recurrence of the mass or similar in other locations and the risks of general anesthetic including MI, CVA, sudden death or reactions to anesthetic medications. The patient understands the risks and alternatives and the possibility of converting to an open procedure. All questions were answered to the patient's satisfaction and they freely signed the consent.        Imelda Brewer MD  10:35 AM  1/9/2023

## 2023-01-09 NOTE — TELEPHONE ENCOUNTER
Per Dr. Nirmal Lopes, patient is scheduled for Excision soft tissue neoplasm back  at [facility] on 23. Surgery scheduled via epic, surgeon's calendar updated. Dr. Nirmal Lopes to enter orders. Follow up appointment scheduled. Electronically signed by Francine Hernandes RN on 2023 at 11:34 AM    Prior Authorization Form:      DEMOGRAPHICS:                     Patient Name:  Andrea Westbrook  Patient :  1960            Insurance:  Payor: Stacia Suarez / Plan: Stacia Suarez / Product Type: *No Product type* /   Insurance ID Number:    Payer/Plan Subscr  Sex Relation Sub. Ins. ID Effective Group Num   1.  [address] 1960 Female Self [id number] 1/1/15 [id number]                                    [id number]         DIAGNOSIS & PROCEDURE:                       Procedure/Operation: Excision soft tissue neoplasm back            CPT Code: 60020    Diagnosis:  soft tissue mass back     ICD10 Code: r22.2    Location:  [facility]    Surgeon:  Neno Rosas INFORMATION:                          Date: 23    Time: TBD              Anesthesia:  MAC/TIVA                                                       Status:  Outpatient        Special Comments:         Electronically signed by Francine Hernandes RN on 2023 at 11:34 AM

## 2023-01-24 ENCOUNTER — ANESTHESIA EVENT (OUTPATIENT)
Dept: OPERATING ROOM | Age: 63
End: 2023-01-24
Payer: COMMERCIAL

## 2023-01-24 ASSESSMENT — LIFESTYLE VARIABLES: SMOKING_STATUS: 0

## 2023-01-24 NOTE — ANESTHESIA PRE PROCEDURE
Department of Anesthesiology  Preprocedure Note       Name:  Dayanna Palacio   Age:  62 y.o.  :  1960                                          MRN:  [id number]         Date:  2023      Surgeon: Surgeon(s):  Scott Duran MD    Procedure: Procedure(s):  EXCISION SOFT TISSUE NEOPLASM BACK    Medications prior to admission:   Prior to Admission medications    Medication Sig Start Date End Date Taking? Authorizing Provider   NONFORMULARY Dr. Funes doxycycline as needed for any dentist appts.  Instructed to take 2 morning of those procedures   Yes Historical Provider, MD   Coenzyme Q10 (CO Q 10 PO) Take 200 mg by mouth   Yes Historical Provider, MD   Cholecalciferol (VITAMIN D3) 125 MCG (5000 UT) TABS Take by mouth   Yes Historical Provider, MD   potassium chloride (KLOR-CON) 10 MEQ extended release tablet TAKE ONE TABLET BY MOUTH TWO TIMES A DAY 22   Historical Provider, MD   HYDROcodone-acetaminophen (NORCO)  MG per tablet TAKE ONE TABLET BY MOUTH EVERY 6 HOURS AS NEEDED for 7 days 10/31/22   Historical Provider, MD   triamterene-hydroCHLOROthiazide (MAXZIDE-25) 37.5-25 MG per tablet TAKE ONE TABLET BY MOUTH DAILY 22   Shan Marcial MD   simvastatin (ZOCOR) 10 MG tablet Take 1 tablet by mouth nightly 8/8/22 10/3/23  Shan Marcial MD   methocarbamol (ROBAXIN) 750 MG tablet Take 1 tablet by mouth 2 times daily as needed (for pain (may cause drowsiness)) 19   Bari Hernandez PA-C       Current medications:    Current Facility-Administered Medications   Medication Dose Route Frequency Provider Last Rate Last Admin   • lactated ringers IV soln infusion   IntraVENous Continuous Maximo Mera MD           Allergies:    Allergies   Allergen Reactions   • Penicillins Hives       Problem List:    Patient Active Problem List   Diagnosis Code   • HTN (hypertension), benign I10   • Hyperlipemia E78.5   • Hypokalemia E87.6   • Back pain M54.9   • Soft tissue mass M79.89       Past Medical  History:        Diagnosis Date    Back pain     COVID     H/O cardiovascular stress test 03/10/2017    lexiscan,    Hyperlipidemia     Hypertension     PONV (postoperative nausea and vomiting)        Past Surgical History:        Procedure Laterality Date    BACK SURGERY  2009    CHOLECYSTECTOMY  1998    COLONOSCOPY      FOOT SURGERY Right     x3    FOOT SURGERY Left     x1    HIP SURGERY Left 10/18/2022    replacment    HYSTERECTOMY (CERVIX STATUS UNKNOWN)      partial    OTHER SURGICAL HISTORY  2017    Excision of soft tissue neoplasm upper back    PARTIAL HYSTERECTOMY (CERVIX NOT REMOVED)         Social History:    Social History     Tobacco Use    Smoking status: Former     Packs/day: 0.30     Years: 15.00     Pack years: 4.50     Types: Cigarettes     Quit date: 1989     Years since quittin.9    Smokeless tobacco: Never   Substance Use Topics    Alcohol use: Yes     Comment: occasional beer or wine                                Counseling given: Not Answered      Vital Signs (Current):   Vitals:    23 1015 23 1016 23 1226   BP:   (!) 178/88   Pulse:   69   Resp:   16   Temp:   97.2 °F (36.2 °C)   TempSrc:   Temporal   SpO2:   98%   Weight:  225 lb (102.1 kg) 220 lb (99.8 kg)   Height: 5' 8\" (1.727 m)  5' 8\" (1.727 m)                                              BP Readings from Last 3 Encounters:   23 (!) 178/88   23 134/86   22 124/80       NPO Status: Time of last liquid consumption: 0600                        Time of last solid consumption: 1900                        Date of last liquid consumption: 23                        Date of last solid food consumption: 23    BMI:   Wt Readings from Last 3 Encounters:   23 220 lb (99.8 kg)   23 214 lb (97.1 kg)   22 214 lb (97.1 kg)     Body mass index is 33.45 kg/m².     CBC:   Lab Results   Component Value Date/Time    WBC 4.4 2022 10:50 AM    RBC 4.26 11/11/2022 10:50 AM    HGB 12.5 11/11/2022 10:50 AM    HCT 36.4 11/11/2022 10:50 AM    MCV 85.4 11/11/2022 10:50 AM    RDW 12.3 11/11/2022 10:50 AM     11/11/2022 10:50 AM       CMP:   Lab Results   Component Value Date/Time     11/11/2022 10:50 AM    K 3.9 11/11/2022 10:50 AM     11/11/2022 10:50 AM    CO2 24 11/11/2022 10:50 AM    BUN 13 11/11/2022 10:50 AM    CREATININE 0.7 11/11/2022 10:50 AM    GFRAA >60 07/23/2022 09:50 AM    LABGLOM >60 11/11/2022 10:50 AM    GLUCOSE 98 11/11/2022 10:50 AM    GLUCOSE 97 10/27/2010 06:30 AM    PROT 6.4 11/11/2022 10:50 AM    CALCIUM 9.5 11/11/2022 10:50 AM    BILITOT 0.7 11/11/2022 10:50 AM    ALKPHOS 98 11/11/2022 10:50 AM    AST 36 11/11/2022 10:50 AM    ALT 17 11/11/2022 10:50 AM       POC Tests: No results for input(s): POCGLU, POCNA, POCK, POCCL, POCBUN, POCHEMO, POCHCT in the last 72 hours. Coags:   Lab Results   Component Value Date/Time    PROTIME 9.8 03/10/2017 02:25 AM    INR 0.9 03/10/2017 02:25 AM    APTT 24.3 03/10/2017 02:25 AM       HCG (If Applicable): No results found for: PREGTESTUR, PREGSERUM, HCG, HCGQUANT     ABGs: No results found for: PHART, PO2ART, OWI1UZN, DOI8ZOR, BEART, A5IJSNPK     Type & Screen (If Applicable):  No results found for: LABABO, LABRH    Drug/Infectious Status (If Applicable):  No results found for: HIV, HEPCAB    COVID-19 Screening (If Applicable):   Lab Results   Component Value Date/Time    COVID19 Not Detected 08/03/2020 12:15 PM           Anesthesia Evaluation  Patient summary reviewed   history of anesthetic complications: PONV.   Airway: Mallampati: I  TM distance: >3 FB   Neck ROM: full  Mouth opening: > = 3 FB   Dental: normal exam         Pulmonary: breath sounds clear to auscultation      (-) not a current smoker ( Former - 4.5 pack years)                           Cardiovascular:    (+) hypertension:, hyperlipidemia        Rhythm: regular  Rate: normal                    Neuro/Psych:   (+) neuromuscular disease (chronic back pain):,             GI/Hepatic/Renal: Neg GI/Hepatic/Renal ROS       (-) no morbid obesity       Endo/Other:    (+) : arthritis:., .                 Abdominal:   (+) obese,           Vascular: negative vascular ROS. Other Findings:           Anesthesia Plan      MAC     ASA 3     (IV Zofran)  Induction: intravenous. MIPS: Prophylactic antiemetics administered. Anesthetic plan and risks discussed with patient. Plan discussed with CRNA. PAT Chart Review:  Chart reviewed per routine by Bobbi Pearce MD.  Above represents information available via shared medical record including previous anesthesia history, drug and allergy history. Confirmation of above and final plan per Day of Surgery (DOS) anesthesiologist.    DOS STAFF ADDENDUM:    Pt seen and examined, chart reviewed (including anesthesia, drug and allergy history). Anesthetic plan, risks, benefits, alternatives, and personnel involved discussed with patient. Patient verbalized an understanding and agrees to proceed. Plan discussed with care team members and agreed upon.     Bobbi Pearce MD  Staff Anesthesiologist  12:43 PM  Bobbi Pearce MD   1/25/2023

## 2023-01-25 ENCOUNTER — ANESTHESIA (OUTPATIENT)
Dept: OPERATING ROOM | Age: 63
End: 2023-01-25
Payer: COMMERCIAL

## 2023-01-25 ENCOUNTER — HOSPITAL ENCOUNTER (OUTPATIENT)
Age: 63
Setting detail: OUTPATIENT SURGERY
Discharge: HOME | End: 2023-01-25
Attending: SURGERY | Admitting: SURGERY
Payer: COMMERCIAL

## 2023-01-25 VITALS
OXYGEN SATURATION: 98 % | WEIGHT: 220 LBS | RESPIRATION RATE: 16 BRPM | SYSTOLIC BLOOD PRESSURE: 122 MMHG | BODY MASS INDEX: 33.34 KG/M2 | TEMPERATURE: 97.5 F | DIASTOLIC BLOOD PRESSURE: 69 MMHG | HEIGHT: 68 IN | HEART RATE: 70 BPM

## 2023-01-25 DIAGNOSIS — M79.89 SOFT TISSUE MASS: ICD-10-CM

## 2023-01-25 DIAGNOSIS — G89.18 POSTOPERATIVE PAIN: Primary | ICD-10-CM

## 2023-01-25 PROCEDURE — 3600000012 HC SURGERY LEVEL 2 ADDTL 15MIN: Performed by: SURGERY

## 2023-01-25 PROCEDURE — 3700000001 HC ADD 15 MINUTES (ANESTHESIA): Performed by: SURGERY

## 2023-01-25 PROCEDURE — 3600000002 HC SURGERY LEVEL 2 BASE: Performed by: SURGERY

## 2023-01-25 PROCEDURE — 3700000000 HC ANESTHESIA ATTENDED CARE: Performed by: SURGERY

## 2023-01-25 PROCEDURE — 21930 EXC BACK LES SC < 3 CM: CPT | Performed by: SURGERY

## 2023-01-25 PROCEDURE — 7100000011 HC PHASE II RECOVERY - ADDTL 15 MIN: Performed by: SURGERY

## 2023-01-25 PROCEDURE — 21932 EXC BACK TUM DEEP < 5 CM: CPT | Performed by: SURGERY

## 2023-01-25 PROCEDURE — 7100000010 HC PHASE II RECOVERY - FIRST 15 MIN: Performed by: SURGERY

## 2023-01-25 PROCEDURE — 6360000002 HC RX W HCPCS: Performed by: NURSE ANESTHETIST, CERTIFIED REGISTERED

## 2023-01-25 PROCEDURE — 2709999900 HC NON-CHARGEABLE SUPPLY: Performed by: SURGERY

## 2023-01-25 PROCEDURE — 2500000003 HC RX 250 WO HCPCS: Performed by: SURGERY

## 2023-01-25 PROCEDURE — 2580000003 HC RX 258: Performed by: ANESTHESIOLOGY

## 2023-01-25 RX ORDER — FENTANYL CITRATE 50 UG/ML
INJECTION, SOLUTION INTRAMUSCULAR; INTRAVENOUS PRN
Status: DISCONTINUED | OUTPATIENT
Start: 2023-01-25 | End: 2023-01-25 | Stop reason: SDUPTHER

## 2023-01-25 RX ORDER — MIDAZOLAM HYDROCHLORIDE 1 MG/ML
INJECTION INTRAMUSCULAR; INTRAVENOUS PRN
Status: DISCONTINUED | OUTPATIENT
Start: 2023-01-25 | End: 2023-01-25 | Stop reason: SDUPTHER

## 2023-01-25 RX ORDER — SODIUM CHLORIDE 0.9 % (FLUSH) 0.9 %
5-40 SYRINGE (ML) INJECTION EVERY 12 HOURS SCHEDULED
Status: DISCONTINUED | OUTPATIENT
Start: 2023-01-25 | End: 2023-01-25 | Stop reason: HOSPADM

## 2023-01-25 RX ORDER — SODIUM CHLORIDE 0.9 % (FLUSH) 0.9 %
5-40 SYRINGE (ML) INJECTION PRN
Status: DISCONTINUED | OUTPATIENT
Start: 2023-01-25 | End: 2023-01-25 | Stop reason: HOSPADM

## 2023-01-25 RX ORDER — HYDROCODONE BITARTRATE AND ACETAMINOPHEN 10; 325 MG/1; MG/1
1 TABLET ORAL EVERY 8 HOURS PRN
Qty: 8 TABLET | Refills: 0 | Status: SHIPPED | OUTPATIENT
Start: 2023-01-25 | End: 2023-01-28

## 2023-01-25 RX ORDER — SODIUM CHLORIDE 9 MG/ML
INJECTION, SOLUTION INTRAVENOUS PRN
Status: DISCONTINUED | OUTPATIENT
Start: 2023-01-25 | End: 2023-01-25 | Stop reason: HOSPADM

## 2023-01-25 RX ORDER — PROPOFOL 10 MG/ML
INJECTION, EMULSION INTRAVENOUS CONTINUOUS PRN
Status: DISCONTINUED | OUTPATIENT
Start: 2023-01-25 | End: 2023-01-25 | Stop reason: SDUPTHER

## 2023-01-25 RX ORDER — LIDOCAINE HYDROCHLORIDE 20 MG/ML
INJECTION, SOLUTION INTRAVENOUS PRN
Status: DISCONTINUED | OUTPATIENT
Start: 2023-01-25 | End: 2023-01-25 | Stop reason: SDUPTHER

## 2023-01-25 RX ORDER — SODIUM CHLORIDE, SODIUM LACTATE, POTASSIUM CHLORIDE, CALCIUM CHLORIDE 600; 310; 30; 20 MG/100ML; MG/100ML; MG/100ML; MG/100ML
INJECTION, SOLUTION INTRAVENOUS CONTINUOUS
Status: DISCONTINUED | OUTPATIENT
Start: 2023-01-25 | End: 2023-01-25 | Stop reason: HOSPADM

## 2023-01-25 RX ADMIN — FENTANYL CITRATE 50 MCG: 50 INJECTION INTRAMUSCULAR; INTRAVENOUS at 13:36

## 2023-01-25 RX ADMIN — FENTANYL CITRATE 50 MCG: 50 INJECTION INTRAMUSCULAR; INTRAVENOUS at 13:34

## 2023-01-25 RX ADMIN — SODIUM CHLORIDE, POTASSIUM CHLORIDE, SODIUM LACTATE AND CALCIUM CHLORIDE: 600; 310; 30; 20 INJECTION, SOLUTION INTRAVENOUS at 12:45

## 2023-01-25 RX ADMIN — LIDOCAINE HYDROCHLORIDE 50 MG: 20 INJECTION, SOLUTION INTRAVENOUS at 13:34

## 2023-01-25 RX ADMIN — PROPOFOL 75 MCG/KG/MIN: 10 INJECTION, EMULSION INTRAVENOUS at 13:34

## 2023-01-25 RX ADMIN — MIDAZOLAM 2 MG: 1 INJECTION INTRAMUSCULAR; INTRAVENOUS at 13:39

## 2023-01-25 ASSESSMENT — PAIN - FUNCTIONAL ASSESSMENT: PAIN_FUNCTIONAL_ASSESSMENT: NONE - DENIES PAIN

## 2023-01-25 NOTE — H&P
General Surgery History and Physical  [facility]    Patient's Name/Date of Birth: Uday Mandujano / 1960    Date: January 25, 2023     Surgeon: Elisa Tilley M.D.    PCP: Marcy Aguayo MD     Chief Complaint: soft tissue neoplasm of the back    HPI:   Uday Mandujano is a 58 y.o. female who presents for evaluation of soft tissue neoplasm of the back. Timing is constant, radiation to right midle back, alleviated by none and started 18 and severity is 7/10. Admits previous drainage, some pain. Denies similar in the past. No fever, chills. Denies previous at the same site. Would like to have removed. Has not been treated with antibiotics. Has had drained previously 2018. Patient Active Problem List   Diagnosis    HTN (hypertension), benign    Hyperlipemia    Hypokalemia    Back pain    Soft tissue mass       Past Medical History:   Diagnosis Date    Back pain     COVID 2020    H/O cardiovascular stress test 03/10/2017    lexiscan,    Hyperlipidemia     Hypertension     PONV (postoperative nausea and vomiting)        Past Surgical History:   Procedure Laterality Date    BACK SURGERY  2009 [address] Right     x3    FOOT SURGERY Left     x1    HIP SURGERY Left 10/18/2022    replacment    HYSTERECTOMY (CERVIX STATUS UNKNOWN)      partial    OTHER SURGICAL HISTORY  02/24/2017    Excision of soft tissue neoplasm upper back    PARTIAL HYSTERECTOMY (CERVIX NOT REMOVED)  2003       Allergies   Allergen Reactions    Penicillins Hives       The patient has a family history that is negative for severe cardiovascular or respiratory issues, negative for reaction to anesthesia. Patient did not report any history of skin cancer in their mother or father. Time spent reviewing past medical, surgical, social and family history, vitals, nursing assessment and images. No changes from above documented history.     Social History     Socioeconomic History    Marital status:      Spouse name: Not on file    Number of children: Not on file    Years of education: Not on file    Highest education level: Not on file   Occupational History    Not on file   Tobacco Use    Smoking status: Former     Packs/day: 0.30     Years: 15.00     Pack years: 4.50     Types: Cigarettes     Quit date: 1989     Years since quittin.9    Smokeless tobacco: Never   Vaping Use    Vaping Use: Never used   Substance and Sexual Activity    Alcohol use: Yes     Comment: occasional beer or wine    Drug use: No    Sexual activity: Yes     Partners: Male   Other Topics Concern    Not on file   Social History Narrative    Not on file     Social Determinants of Health     Financial Resource Strain: Low Risk     Difficulty of Paying Living Expenses: Not hard at all   Food Insecurity: No Food Insecurity    Worried About Running Out of Food in the Last Year: Never true    Ran Out of Food in the Last Year: Never true   Transportation Needs: Not on file   Physical Activity: Not on file   Stress: Not on file   Social Connections: Not on file   Intimate Partner Violence: Not on file   Housing Stability: Not on file           Review of Systems  A complete 10 system review was performed and are otherwise negative unless mentioned in the above HPI. Specific negatives are listed below but may not include all those reviewed.     General ROS: negative obtundation, AMS  ENT ROS: negative rhinorrhea, epistaxis  Allergy and Immunology ROS: negative itchy/watery eyes or nasal congestion  Hematological and Lymphatic ROS: negative spontaneous bleeding or bruising  Endocrine ROS: negative  lethargy, mood swings, palpitations or polydipsia/polyuria  Respiratory ROS: negative sputum changes, stridor, tachypnea or wheezing  Cardiovascular ROS: negative for - loss of consciousness, murmur or orthopnea  Gastrointestinal ROS: negative for - hematochezia or hematemesis  Genito-Urinary ROS: negative for -  genital discharge or hematuria  Musculoskeletal ROS: negative for - focal weakness, gangrene  Psych/Neuro ROS: negative for - visual or auditory hallucinations, suicidal ideation    Physical exam:   BP (!) 178/88   Pulse 69   Temp 97.2 °F (36.2 °C) (Temporal)   Resp 16   Ht 5' 8\" (1.727 m)   Wt 220 lb (99.8 kg)   SpO2 98%   BMI 33.45 kg/m²   General appearance:  NAD, appears stated age  Head: NCAT, PERRLA, EOMI, red conjunctiva  Neck: supple, no masses, trachea midline  Lungs: Equal chest rise bilateral, no retractions, no wheezing  Heart: Reg rate  Abdomen: soft, nontender, nondistended  Skin; soft tissue mass 2 cm located middle back, mobile, mildly tender, no drainage  Gu: no cva tenderness  Extremities: atraumatic, no focal motor deficits, no open wounds  Psych: No tremor, visual hallucinations      Radiology: I reviewed relevant abdominal imaging from this admission and that available in the EMR       Assessment:  Lorne Yu is a 58 y.o. female with soft tissue neoplasm of the back, pain at the site with swelling, concerns for malignancy  Patient Active Problem List   Diagnosis    HTN (hypertension), benign    Hyperlipemia    Hypokalemia    Back pain    Soft tissue mass         Plan:  We discussed the underlying nature of soft tissue masses including the proposal diagnosis of this specific mass as it relates to benign or malignant disease  We discussed the nonoperative management and monitoring as an option  We discussed antibiotic treatment mechanism of action of the proposed antibiotics  We discussed the nature of the surgery and described the nature of closure including sutures and glue  I independently reviewed all relative urgent care and medical specialist notes in the electronic medical record pertaining to the above procedure and any contributing factors that may affect surgical intervention relating to bleeding or cardiovascular tolerance of the procedure.   We discussed the possibility of wound dehiscence the aftercare that this may necessitate we discussed the ongoing need for surveillance of wound care as well as care after surgery of the incisions and/or wounds    OR for excision soft tissue neoplasm of back  Discussed the risk, benefits and alternatives of surgery including wound infections, bleeding, scar, seroma, hematoma and recurrence of the mass or similar in other locations and the risks of general anesthetic including MI, CVA, sudden death or reactions to anesthetic medications. The patient understands the risks and alternatives and the possibility of converting to an open procedure. All questions were answered to the patient's satisfaction and they freely signed the consent.        Kennedy Severin, MD  1:01 PM  1/25/2023

## 2023-01-25 NOTE — ANESTHESIA POSTPROCEDURE EVALUATION
Department of Anesthesiology  Postprocedure Note    Patient: Cheryle Castilla  MRN: [id number]  YOB: 1960  Date of evaluation: 1/25/2023      Procedure Summary     Date: 01/25/23 Room / Location: [address] [place] [address] 02 / [address] [facility]    Anesthesia Start: 1375 Anesthesia Stop: 1400    Procedure: EXCISION SOFT TISSUE NEOPLASM RIGHT LOWER BACK AND MIDDLE BACK Diagnosis:       Soft tissue mass      (Soft tissue mass [M79.89])    Surgeons: Charo Crain MD Responsible Provider: Cayt Tesfaye MD    Anesthesia Type: MAC ASA Status: 3          Anesthesia Type: MAC    Helio Phase I: Helio Score: 10    Helio Phase II: Helio Score: 8      Anesthesia Post Evaluation    Patient location during evaluation: PACU  Patient participation: complete - patient participated  Level of consciousness: awake and alert  Airway patency: patent  Nausea & Vomiting: no nausea and no vomiting  Complications: no  Cardiovascular status: hemodynamically stable  Respiratory status: acceptable  Hydration status: euvolemic

## 2023-01-25 NOTE — DISCHARGE INSTRUCTIONS
Patient Discharge Instructions  Discharge Date:  1/25/2023    Discharged To: Home    RESUME ACTIVITY:      BATHING:  May shower 24hrs after surgery, remove dressings after 24hrs if in place, leave steristrips in place as they will fall of independently. You may have adhesive glue covering your incisions which will dissolve on it own. May bathe or swim 5 days after surgery    DRIVING: No driving while on pain medications    RETURN TO WORK: As tolerated     WALKING:  Yes    SEXUAL ACTIVITY: Yes    STAIRS:  Yes    LIFTING: As tolerated     DIET: General adult    SPECIAL INSTRUCTIONS:     Call physician if they or any other problems occur:  Fever over 101°    Redness, swelling, hardness or warmth at the operative site  Unrelieved nausea    Foul smelling or cloudy drainage at the operative site   Unrelieved pain    Blood soaked dressing. (Some oozing may be normal)    Call office for follow up appointment with Dr Mukund Henry in 2 weeks. Call the office at [phone] if you have a fever > 100 F, or if your incision becomes red, tender, or drains more than a small amount of clear fluid. BOWELS: constipation is a side effect of your pain meds, take a daily laxative (miralax, dulcolax, etc.) as needed to keep your bowels moving as they normally do, do not go 2-3 days without having a bowel movement. Pain medications; Tylenol 500mg oral every 6hrs over the counter should be sufficient for pain control. Ok to take 800mg Ibuprofen every 6hrs with food if pain not controlled with Tylenol or adverse reaction in the past.   Percocet- take at least 1/2 pill every 6 hours the first 36 hours after surgery as needed for pain, and may take as many as 2 pills every 6 hours. After the first 36hours only take the pills as needed and stop them as soon as possible. Pain meds cause constipation so pay close attention to the \"bowels\" topic above. Keep incisions clean and dry.   Vicodin/Percocet and ibuprofen for pain as prescribed. Okay to resume anticoagulant medication after 24hrs. Do not exceed 4000mg of Tylenol/Acetaminophen per day. Vicodin/Norco/Percocet contain acetaminophen. Do not take additional amounts of Tylenol if you are taking these medications. Infection After Surgery: Care Instructions  Overview  After surgery, an infection is always possible. It doesn't mean that the surgery didn't go well. Because an infection can be serious, your doctor has taken steps to manage it. Your doctor checked the infection and cleaned it if necessary. Your doctor may have made an opening in the area so that the pus can drain out. You may have gauze in the cut so that the area will stay open and keep draining. You may need antibiotics. You will need to follow up with your doctor to make sure the infection has gone away. Follow-up care is a key part of your treatment and safety. Be sure to make and go to all appointments, and call your doctor if you are having problems. It's also a good idea to know your test results and keep a list of the medicines you take. How can you care for yourself at home? Make sure your surgeon knows about the infection, especially if you saw another doctor about your symptoms. If your doctor prescribed antibiotics, take them as directed. Do not stop taking them just because you feel better. You need to take the full course of antibiotics. Ask your doctor if you can take an over-the-counter pain medicine, such as acetaminophen (Tylenol), ibuprofen (Advil, Motrin), or naproxen (Aleve). Be safe with medicines. Read and follow all instructions on the label. Do not take two or more pain medicines at the same time unless the doctor told you to. Many pain medicines have acetaminophen, which is Tylenol. Too much acetaminophen (Tylenol) can be harmful. Prop up the area on a pillow anytime you sit or lie down during the next 3 days. Try to keep it above the level of your heart.  This will help reduce swelling. Keep the skin clean and dry. You may have a bandage over the cut (incision). A bandage helps the incision heal and protects it. Your doctor will tell you how to take care of this. Keep it clean and dry. You may have drainage from the wound. If your doctor told you how to care for your incision, follow your doctor's instructions. If you did not get instructions, follow this general advice:  Wash around the incision with clean water 2 times a day. Don't use hydrogen peroxide or alcohol, which can slow healing. When should you call for help? Call your doctor now or seek immediate medical care if:    You have signs that your infection is getting worse, such as: Increased pain, swelling, warmth, or redness in the area. Red streaks leading from the area. Pus draining from the wound. A new or higher fever. Watch closely for changes in your health, and be sure to contact your doctor if you have any problems. Where can you learn more? Go to http://www.woods.com/ and enter C340 to learn more about \"Infection After Surgery: Care Instructions. \"  Current as of: January 20, 2022               Content Version: 13.5  © 9899-3780 [employer]. Care instructions adapted under license by [facility] ([facility]). If you have questions about a medical condition or this instruction, always ask your healthcare professional. [address] any warranty or liability for your use of this information. Nausea and Vomiting After Surgery: Care Instructions  Your Care Instructions     After you've had surgery, you may feel sick to your stomach (nauseated) or you may vomit. Sometimes anesthesia can make you feel sick. It's a common side effect and often doesn't last long. Pain also can make you feel sick or vomit. After the anesthesia wears off, you may feel pain from the incision (cut). That pain could then upset your stomach.  Taking pain medicine can also make you feel sick to your stomach. Whatever the cause, you may get medicine that can help. There are also some things you can do at home to prevent nausea and feel better. The doctor has checked you carefully, but problems can develop later. If you notice any problems or new symptoms, get medical treatment right away. Follow-up care is a key part of your treatment and safety. Be sure to make and go to all appointments, and call your doctor if you are having problems. It's also a good idea to know your test results and keep a list of the medicines you take. How can you care for yourself at home? Be safe with medicines. Read and follow all instructions on the label. If the doctor gave you a prescription medicine for pain, take it as prescribed. If you are not taking a prescription pain medicine, ask your doctor if you can take an over-the-counter medicine. Take your pain medicine as soon as you have pain. It works better if you take it before the pain gets bad. Call your doctor if you have any problems with your medicine. Rest in bed until you feel better. To prevent dehydration, drink plenty of fluids. Choose water and other clear liquids until you feel better. If you have kidney, heart, or liver disease and have to limit fluids, talk with your doctor before you increase the amount of fluids you drink. When you are able to eat, try clear soups, mild foods, and liquids until all symptoms are gone for 12 to 48 hours. Other good choices include dry toast, crackers, cooked cereal, and gelatin dessert, such as Jell-O. Do not smoke. Smoking and being around smoke can make nausea worse. If you need help quitting, talk to your doctor about stop-smoking programs and medicines. These can increase your chances of quitting for good. When should you call for help? Call 911  anytime you think you may need emergency care. For example, call if:    You passed out (lost consciousness).    Call your doctor now or seek immediate medical care if:    You have new or worse nausea or vomiting. You are too sick to your stomach to drink any fluids. You cannot keep down fluids. You have symptoms of dehydration, such as:  Dry eyes and a dry mouth. Passing only a little urine. Feeling thirstier than usual.     Your pain medicine is not helping. You are dizzy or lightheaded, or you feel like you may faint. Watch closely for changes in your health, and be sure to contact your doctor if:    You do not get better as expected. Current as of: March 9, 2022               Content Version: 13.5  © 4011-5067 [employer]. Care instructions adapted under license by Laura Solis. If you have questions about a medical condition or this instruction, always ask your healthcare professional. [address] any warranty or liability for your use of this information.

## 2023-01-25 NOTE — OP NOTE
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 POSTOPERATIVE DIAGNOSIS: same. OPERATION: Excision of soft tissue neoplasm of upper middle back,subfascial, intramuscular, 3cm X 1cm. And soft tissue of the right lower back subcutaneous, 1x1cm    ANESTHESIA: LMAC and local.     ESTIMATED BLOOD LOSS: Minimal.     COMPLICATIONS: None. FLUIDS: Crystalloid. DISPOSITION: Discharged home. SPECIMEN: Soft tissue neoplasm back middle and right lower. PROCEDURE: The patient was brought into the operative suite and was placed under [facility] anesthesia; was infused with local anesthetic after being prepped and draped in a normal sterile condition. Once this was done, approximately a 3-cm elliptical incision was made in the middle upper back over the palpable mass. Once this was done, the incision was carried down through the dermis with electrocautery. We dissected down through the subcutaneous tissue to the level of the fascia entered into the fascia below into the muscle belly. We had to dissect completely around and excised an intramuscular and subfascial component in order to completely remove the mass. The mass measured 3cm x 1cm. This was then delivered and sent for specimen. Similarly, a 1 x 1 cm incision was made surrounding the palpable mass on the right side of the lower back. This was confined to the subcutaneous tissue and was excised using a scalpel and cautery. There is no evidence of invasion into the deep muscular tissue or fascia. There was excised and sent to pathology labeled soft tissue neoplasm of the right lower back.   The wounds were sterilely irrigated, and electrocautery was used to obtain hemostasis. Once this was done, deep dermal stitches were placed with a 3-0 Vicryl suture to close the fascia over the underlying muscle, and then used interrupted 3-0 Vicryl deep dermal stitches to approximate the skin. Finally, surgical glue was placed, and the patient was woken up in stable condition and taken to PACU.     Scott Duran MD, MS  Minimally Invasive and Bariatric Surgery  [phone] (p)  1/25/2023  1:58 PM

## 2023-02-06 RX ORDER — POTASSIUM CHLORIDE 750 MG/1
TABLET, FILM COATED, EXTENDED RELEASE ORAL
Qty: 180 TABLET | Refills: 0 | Status: SHIPPED | OUTPATIENT
Start: 2023-02-06

## 2023-02-08 ENCOUNTER — OFFICE VISIT (OUTPATIENT)
Dept: SURGERY | Age: 63
End: 2023-02-08

## 2023-02-08 VITALS
BODY MASS INDEX: 33.34 KG/M2 | HEIGHT: 68 IN | DIASTOLIC BLOOD PRESSURE: 93 MMHG | SYSTOLIC BLOOD PRESSURE: 158 MMHG | RESPIRATION RATE: 20 BRPM | TEMPERATURE: 97.3 F | WEIGHT: 220 LBS | HEART RATE: 74 BPM

## 2023-02-08 DIAGNOSIS — M79.89 SOFT TISSUE MASS: Primary | ICD-10-CM

## 2023-02-08 DIAGNOSIS — L82.1 SEBORRHEIC KERATOSES: ICD-10-CM

## 2023-02-08 DIAGNOSIS — L72.0 EPIDERMOID CYST OF SKIN OF BACK: ICD-10-CM

## 2023-02-08 PROCEDURE — 99024 POSTOP FOLLOW-UP VISIT: CPT | Performed by: SURGERY

## 2023-02-08 NOTE — PROGRESS NOTES
General Surgery Office Note  Darrick Pedroza MD, MS    Patient's Name/Date of Birth: Cintia Irwin / 1960    Date: February 8, 2023     Surgeon: Melanie Irving MD    Chief Complaint: s/p excision soft tissue neoplasm of the back      Patient Active Problem List   Diagnosis    HTN (hypertension), benign    Hyperlipemia    Hypokalemia    Back pain    Soft tissue mass       Subjective: Doing well, no pain, incision well healed, no seroma, no new complaints    Objective:  BP (!) 158/93   Pulse 74   Temp 97.3 °F (36.3 °C) (Temporal)   Resp 20   Ht 5' 8\" (1.727 m)   Wt 220 lb (99.8 kg)   BMI 33.45 kg/m²   Labs:  No results for input(s): WBC, HGB, HCT in the last 72 hours. Invalid input(s): PLR  Lab Results   Component Value Date    CREATININE 0.7 11/11/2022    BUN 13 11/11/2022     11/11/2022    K 3.9 11/11/2022     11/11/2022    CO2 24 11/11/2022     No results for input(s): LIPASE, AMYLASE in the last 72 hours. General appearance: AA, NAD  HEENT: NCAT, PERRLA, EOMI  Lungs: Clear, equal rise bilateral  Heart: Reg  Abdomen: soft, nondistended, nontender  Skin: No lesions, incisions well healed   Psych: No distress, conversive, no hallucinations  : No ulcers or lesions  Rectal: No bleeding    A complete 10 system review was performed and are otherwise negative unless mentioned in the above HPI. Specific negatives are listed below but may not include all those reviewed.     General ROS: negative obtundation, AMS  ENT ROS: negative rhinorrhea, epistaxis  Allergy and Immunology ROS: negative itchy/watery eyes or nasal congestion  Hematological and Lymphatic ROS: negative spontaneous bleeding or bruising  Endocrine ROS: negative  lethargy, mood swings, palpitations or polydipsia/polyuria  Respiratory ROS: negative sputum changes, stridor, tachypnea or wheezing  Cardiovascular ROS: negative for - loss of consciousness, murmur or orthopnea  Gastrointestinal ROS: negative for - hematochezia or hematemesis  Genito-Urinary ROS: negative for -  genital discharge or hematuria  Musculoskeletal ROS: negative for - focal weakness, gangrene  Psych/Neuro ROS: negative for - visual or auditory hallucinations, suicidal ideation      Time spent reviewing past medical, surgical, social and family history, vitals, nursing assessment and images. Pathology: Diagnosis:   A. \"Soft tissue mass\", right lower back, skin excision: Seborrheic   keratosis. B.  \"Soft tissue mass\", middle back, excision: Epidermoid cyst.   Detached segment of mature lobulated adipose tissue consistent with   lipoma.      Assessment/Plan:  Jack Call is a 58 y.o. female 2 weeks s/p excision soft tissue neoplasm of the back x2    Doing  well  Resume activity gradually   Follow up as needed  Pathology reviewed and copy provided      Physician Signature: Electronically signed by Dr. Ankita Donahue  [phone] (p)  2/8/2023  8:56 AM

## 2023-02-10 DIAGNOSIS — E78.2 MIXED HYPERLIPIDEMIA: ICD-10-CM

## 2023-02-10 RX ORDER — SIMVASTATIN 10 MG
TABLET ORAL
Qty: 90 TABLET | Refills: 0 | Status: SHIPPED | OUTPATIENT
Start: 2023-02-10

## 2023-02-10 NOTE — TELEPHONE ENCOUNTER
Last Appointment:  11/14/2022  Future Appointments   Date Time Provider Bri Diez   2/15/2023  4:15 PM Cal Albert MD [facility]

## 2023-02-14 ENCOUNTER — HOSPITAL ENCOUNTER (OUTPATIENT)
Age: 63
Discharge: HOME | End: 2023-02-14
Payer: COMMERCIAL

## 2023-02-14 DIAGNOSIS — I10 HTN (HYPERTENSION), BENIGN: ICD-10-CM

## 2023-02-14 DIAGNOSIS — E78.2 MIXED HYPERLIPIDEMIA: ICD-10-CM

## 2023-02-14 DIAGNOSIS — M19.90 ARTHRITIS: ICD-10-CM

## 2023-02-14 LAB
ALBUMIN SERPL-MCNC: 4.1 G/DL (ref 3.5–5.2)
ALP BLD-CCNC: 75 U/L (ref 35–104)
ALT SERPL-CCNC: 18 U/L (ref 0–32)
ANION GAP SERPL CALCULATED.3IONS-SCNC: 11 MMOL/L (ref 7–16)
AST SERPL-CCNC: 22 U/L (ref 0–31)
BASOPHILS ABSOLUTE: 0.04 E9/L (ref 0–0.2)
BASOPHILS RELATIVE PERCENT: 1 % (ref 0–2)
BILIRUB SERPL-MCNC: 0.7 MG/DL (ref 0–1.2)
BUN BLDV-MCNC: 17 MG/DL (ref 6–23)
CALCIUM SERPL-MCNC: 9.4 MG/DL (ref 8.6–10.2)
CHLORIDE BLD-SCNC: 105 MMOL/L (ref 98–107)
CHOLESTEROL, TOTAL: 153 MG/DL (ref 0–199)
CO2: 26 MMOL/L (ref 22–29)
CREAT SERPL-MCNC: 0.7 MG/DL (ref 0.5–1)
EOSINOPHILS ABSOLUTE: 0.07 E9/L (ref 0.05–0.5)
EOSINOPHILS RELATIVE PERCENT: 1.7 % (ref 0–6)
GFR SERPL CREATININE-BSD FRML MDRD: >60 ML/MIN/1.73
GLUCOSE BLD-MCNC: 102 MG/DL (ref 74–99)
HCT VFR BLD CALC: 40.3 % (ref 34–48)
HDLC SERPL-MCNC: 56 MG/DL
HEMOGLOBIN: 13.8 G/DL (ref 11.5–15.5)
IMMATURE GRANULOCYTES #: 0.01 E9/L
IMMATURE GRANULOCYTES %: 0.2 % (ref 0–5)
LDL CHOLESTEROL CALCULATED: 78 MG/DL (ref 0–99)
LYMPHOCYTES ABSOLUTE: 1.29 E9/L (ref 1.5–4)
LYMPHOCYTES RELATIVE PERCENT: 31.5 % (ref 20–42)
MCH RBC QN AUTO: 28.5 PG (ref 26–35)
MCHC RBC AUTO-ENTMCNC: 34.2 % (ref 32–34.5)
MCV RBC AUTO: 83.3 FL (ref 80–99.9)
MONOCYTES ABSOLUTE: 0.35 E9/L (ref 0.1–0.95)
MONOCYTES RELATIVE PERCENT: 8.6 % (ref 2–12)
NEUTROPHILS ABSOLUTE: 2.33 E9/L (ref 1.8–7.3)
NEUTROPHILS RELATIVE PERCENT: 57 % (ref 43–80)
PDW BLD-RTO: 12.7 FL (ref 11.5–15)
PLATELET # BLD: 184 E9/L (ref 130–450)
PMV BLD AUTO: 11.6 FL (ref 7–12)
POTASSIUM SERPL-SCNC: 3.9 MMOL/L (ref 3.5–5)
RBC # BLD: 4.84 E12/L (ref 3.5–5.5)
SODIUM BLD-SCNC: 142 MMOL/L (ref 132–146)
TOTAL PROTEIN: 6.7 G/DL (ref 6.4–8.3)
TRIGL SERPL-MCNC: 93 MG/DL (ref 0–149)
VLDLC SERPL CALC-MCNC: 19 MG/DL
WBC # BLD: 4.1 E9/L (ref 4.5–11.5)

## 2023-02-14 PROCEDURE — 36415 COLL VENOUS BLD VENIPUNCTURE: CPT

## 2023-02-14 PROCEDURE — 80061 LIPID PANEL: CPT

## 2023-02-14 PROCEDURE — 85025 COMPLETE CBC W/AUTO DIFF WBC: CPT

## 2023-02-14 PROCEDURE — 80053 COMPREHEN METABOLIC PANEL: CPT

## 2023-02-15 ENCOUNTER — OFFICE VISIT (OUTPATIENT)
Dept: FAMILY MEDICINE CLINIC | Age: 63
End: 2023-02-15
Payer: COMMERCIAL

## 2023-02-15 VITALS
OXYGEN SATURATION: 96 % | WEIGHT: 223 LBS | HEART RATE: 94 BPM | BODY MASS INDEX: 33.91 KG/M2 | DIASTOLIC BLOOD PRESSURE: 70 MMHG | TEMPERATURE: 97.2 F | SYSTOLIC BLOOD PRESSURE: 124 MMHG

## 2023-02-15 DIAGNOSIS — G89.29 CHRONIC LOW BACK PAIN WITHOUT SCIATICA, UNSPECIFIED BACK PAIN LATERALITY: ICD-10-CM

## 2023-02-15 DIAGNOSIS — R73.9 HYPERGLYCEMIA: ICD-10-CM

## 2023-02-15 DIAGNOSIS — I10 HTN (HYPERTENSION), BENIGN: ICD-10-CM

## 2023-02-15 DIAGNOSIS — Z12.31 ENCOUNTER FOR SCREENING MAMMOGRAM FOR MALIGNANT NEOPLASM OF BREAST: ICD-10-CM

## 2023-02-15 DIAGNOSIS — E78.2 MIXED HYPERLIPIDEMIA: Primary | ICD-10-CM

## 2023-02-15 DIAGNOSIS — M54.50 CHRONIC LOW BACK PAIN WITHOUT SCIATICA, UNSPECIFIED BACK PAIN LATERALITY: ICD-10-CM

## 2023-02-15 PROCEDURE — 3074F SYST BP LT 130 MM HG: CPT | Performed by: FAMILY MEDICINE

## 2023-02-15 PROCEDURE — 3078F DIAST BP <80 MM HG: CPT | Performed by: FAMILY MEDICINE

## 2023-02-15 PROCEDURE — 99214 OFFICE O/P EST MOD 30 MIN: CPT | Performed by: FAMILY MEDICINE

## 2023-02-15 RX ORDER — HYDROCODONE BITARTRATE AND ACETAMINOPHEN 10; 325 MG/1; MG/1
1 TABLET ORAL DAILY PRN
COMMUNITY

## 2023-02-15 SDOH — ECONOMIC STABILITY: INCOME INSECURITY: HOW HARD IS IT FOR YOU TO PAY FOR THE VERY BASICS LIKE FOOD, HOUSING, MEDICAL CARE, AND HEATING?: NOT HARD AT ALL

## 2023-02-15 SDOH — ECONOMIC STABILITY: FOOD INSECURITY: WITHIN THE PAST 12 MONTHS, THE FOOD YOU BOUGHT JUST DIDN'T LAST AND YOU DIDN'T HAVE MONEY TO GET MORE.: NEVER TRUE

## 2023-02-15 SDOH — ECONOMIC STABILITY: FOOD INSECURITY: WITHIN THE PAST 12 MONTHS, YOU WORRIED THAT YOUR FOOD WOULD RUN OUT BEFORE YOU GOT MONEY TO BUY MORE.: NEVER TRUE

## 2023-02-15 SDOH — ECONOMIC STABILITY: HOUSING INSECURITY
IN THE LAST 12 MONTHS, WAS THERE A TIME WHEN YOU DID NOT HAVE A STEADY PLACE TO SLEEP OR SLEPT IN A SHELTER (INCLUDING NOW)?: NO

## 2023-02-15 ASSESSMENT — PATIENT HEALTH QUESTIONNAIRE - PHQ9
SUM OF ALL RESPONSES TO PHQ QUESTIONS 1-9: 0
1. LITTLE INTEREST OR PLEASURE IN DOING THINGS: 0
2. FEELING DOWN, DEPRESSED OR HOPELESS: 0
SUM OF ALL RESPONSES TO PHQ9 QUESTIONS 1 & 2: 0
SUM OF ALL RESPONSES TO PHQ QUESTIONS 1-9: 0

## 2023-02-15 ASSESSMENT — ENCOUNTER SYMPTOMS
ALLERGIC/IMMUNOLOGIC NEGATIVE: 1
EYES NEGATIVE: 1
GASTROINTESTINAL NEGATIVE: 1
RESPIRATORY NEGATIVE: 1

## 2023-02-15 NOTE — PROGRESS NOTES
OFFICE PROGRESS NOTE      SUBJECTIVE:        Patient ID:   Dennis Lucia is a 58 y.o. female who presents for   Chief Complaint   Patient presents with    Hypertension     Here for recheck on Hypertension and  Hyperlipidemia. Patient reports feeling well. Lab work done,here for review. Patient for the follow-up      HPI:   Patient here for the follow-up  States she is feeling good  Lab work is within normal limit  Fasting sugar 102  Patient does follow the diet and exercise  Has been taking her medication as prescribed  Back problem is better  Patient states she is not taking any more pain medication      Prior to Visit Medications    Medication Sig Taking? Authorizing Provider   HYDROcodone-acetaminophen (NORCO)  MG per tablet Take 1 tablet by mouth daily as needed for Pain. Yes Historical Provider, MD   simvastatin (ZOCOR) 10 MG tablet TAKE ONE TABLET BY MOUTH NIGHTLY Yes Maury Cleveland MD   potassium chloride (KLOR-CON) 10 MEQ extended release tablet Lucille Warren Yes Maury Cleveland MD   NONFORMULARY Dr. Carlos Pham doxycycline as needed for any dentist appts.   Instructed to take 2 morning of those procedures Yes Historical Provider, MD   Coenzyme Q10 (CO Q 10 PO) Take 200 mg by mouth Yes Historical Provider, MD   Cholecalciferol (VITAMIN D3) 125 MCG (5000 UT) TABS Take by mouth Yes Historical Provider, MD   triamterene-hydroCHLOROthiazide (MAXZIDE-25) 37.5-25 MG per tablet TAKE ONE TABLET BY MOUTH DAILY Yes Maury Cleveland MD   methocarbamol (ROBAXIN) 750 MG tablet Take 1 tablet by mouth 2 times daily as needed (for pain (may cause drowsiness)) Yes Linnette Drake PA-C      Social History     Socioeconomic History    Marital status:      Spouse name: None    Number of children: None    Years of education: None    Highest education level: None   Tobacco Use    Smoking status: Former     Packs/day: 0.30     Years: 15.00     Pack years: 4.50     Types: Cigarettes     Quit date: 1989     Years since quittin.0    Smokeless tobacco: Never   Vaping Use    Vaping Use: Never used   Substance and Sexual Activity    Alcohol use: Yes     Comment: occasional beer or wine    Drug use: No    Sexual activity: Yes     Partners: Male     Social Determinants of Health     Financial Resource Strain: Low Risk     Difficulty of Paying Living Expenses: Not hard at all   Food Insecurity: No Food Insecurity    Worried About Running Out of Food in the Last Year: Never true    Ran Out of Food in the Last Year: Never true   Transportation Needs: Unknown    Lack of Transportation (Non-Medical): No   Housing Stability: Unknown    Unstable Housing in the Last Year: No       I have reviewed Dayanna's allergies, medications, problem list, medical, social and family history and have updated as needed in the electronic medical record  Review Of Systems:    Review of Systems   Constitutional: Negative. HENT: Negative. Eyes: Negative. Respiratory: Negative. Cardiovascular: Negative. Gastrointestinal: Negative. Endocrine: Negative. Musculoskeletal: Negative. Skin: Negative. Allergic/Immunologic: Negative. Neurological: Negative. Hematological: Negative. Psychiatric/Behavioral: Negative. OBJECTIVE:     VS:  Wt Readings from Last 3 Encounters:   02/15/23 223 lb (101.2 kg)   23 220 lb (99.8 kg)   23 220 lb (99.8 kg)     Temp Readings from Last 3 Encounters:   02/15/23 97.2 °F (36.2 °C) (Infrared)   23 97.3 °F (36.3 °C) (Temporal)   23 97.5 °F (36.4 °C) (Skin)     BP Readings from Last 3 Encounters:   02/15/23 124/70   23 (!) 158/93   23 122/69        Physical Exam  Constitutional:       Appearance: She is well-developed. HENT:      Head: Normocephalic and atraumatic. Eyes:      Conjunctiva/sclera: Conjunctivae normal.      Pupils: Pupils are equal, round, and reactive to light. Cardiovascular:      Rate and Rhythm: Normal rate and regular rhythm. Heart sounds: Normal heart sounds. Pulmonary:      Effort: Pulmonary effort is normal.      Breath sounds: Normal breath sounds. Abdominal:      General: Bowel sounds are normal.      Palpations: Abdomen is soft. Musculoskeletal:         General: Normal range of motion. Cervical back: Normal range of motion and neck supple. Skin:     General: Skin is warm and dry. Neurological:      Mental Status: She is alert and oriented to person, place, and time. Psychiatric:         Thought Content: Thought content normal.          Labs :    Lab Results   Component Value Date    WBC 4.1 (L) 02/14/2023    HGB 13.8 02/14/2023    HCT 40.3 02/14/2023     02/14/2023    CHOL 153 02/14/2023    TRIG 93 02/14/2023    HDL 56 02/14/2023    ALT 18 02/14/2023    AST 22 02/14/2023     02/14/2023    K 3.9 02/14/2023     02/14/2023    CREATININE 0.7 02/14/2023    BUN 17 02/14/2023    CO2 26 02/14/2023    TSH 1.010 03/11/2017    INR 0.9 03/10/2017    GLUF 100 (H) 03/17/2019     Lab Results   Component Value Date/Time    COLORU Yellow 02/08/2020 10:05 AM    NITRU Negative 02/08/2020 10:05 AM    GLUCOSEU Negative 02/08/2020 10:05 AM    KETUA Negative 02/08/2020 10:05 AM    UROBILINOGEN 0.2 02/08/2020 10:05 AM    BILIRUBINUR Negative 02/08/2020 10:05 AM     No results found for: PSA, CEA, , TG7046,       Controlled Substances Monitoring:                                    ASSESSMENT     Patient Active Problem List    Diagnosis Date Noted    Soft tissue mass 01/09/2023    Back pain 03/05/2018    HTN (hypertension), benign 03/10/2017    Hyperlipemia 03/10/2017    Hypokalemia 03/10/2017        Diagnosis:   1. Mixed hyperlipidemia  -     CBC with Auto Differential; Future  -     Comprehensive Metabolic Panel; Future  -     Lipid Panel; Future  2.  HTN (hypertension), benign  -     CBC with Auto Differential; Future  - Comprehensive Metabolic Panel; Future  3. Chronic low back pain without sciatica, unspecified back pain laterality  4. Hyperglycemia  -     Comprehensive Metabolic Panel; Future  -     Lipid Panel; Future  -     Hemoglobin A1C; Future     PLAN:   Continue present treatment  Low-sodium low-fat low-carb diet  Regular exercises  Lab work before the next visit  Return to clinic earlier if any problems  All instructions given to the patient        Patient Instructions   Continue present treatment  Low-sodium low-fat low-carb diet  Regular exercises  Lab work before the next visit  Return to clinic earlier if any problems    Return in about 3 months (around 5/15/2023) for Medication Check, test results. Alex Asif reviewed my findings and recommendations with Lucie Joseph.     Electronicallysigned by Krystle Bhagat MD on 2/15/23 at 4:32 PM EST

## 2023-03-13 ENCOUNTER — HOSPITAL ENCOUNTER (OUTPATIENT)
Age: 63
Discharge: HOME | End: 2023-03-15
Payer: COMMERCIAL

## 2023-03-13 ENCOUNTER — HOSPITAL ENCOUNTER (OUTPATIENT)
Dept: GENERAL RADIOLOGY | Age: 63
Discharge: HOME | End: 2023-03-15
Payer: COMMERCIAL

## 2023-03-13 DIAGNOSIS — Z96.642 HISTORY OF TOTAL LEFT HIP REPLACEMENT: ICD-10-CM

## 2023-03-13 PROCEDURE — 73502 X-RAY EXAM HIP UNI 2-3 VIEWS: CPT

## 2023-04-19 ENCOUNTER — TELEPHONE (OUTPATIENT)
Dept: SURGERY | Age: 63
End: 2023-04-19

## 2023-04-19 ENCOUNTER — OFFICE VISIT (OUTPATIENT)
Dept: SURGERY | Age: 63
End: 2023-04-19
Payer: COMMERCIAL

## 2023-04-19 VITALS
HEART RATE: 73 BPM | WEIGHT: 223 LBS | BODY MASS INDEX: 33.8 KG/M2 | TEMPERATURE: 98.1 F | DIASTOLIC BLOOD PRESSURE: 85 MMHG | SYSTOLIC BLOOD PRESSURE: 138 MMHG | HEIGHT: 68 IN

## 2023-04-19 DIAGNOSIS — M79.89 SOFT TISSUE MASS: Primary | ICD-10-CM

## 2023-04-19 PROCEDURE — 3075F SYST BP GE 130 - 139MM HG: CPT | Performed by: SURGERY

## 2023-04-19 PROCEDURE — 99214 OFFICE O/P EST MOD 30 MIN: CPT | Performed by: SURGERY

## 2023-04-19 PROCEDURE — 3079F DIAST BP 80-89 MM HG: CPT | Performed by: SURGERY

## 2023-04-19 RX ORDER — SODIUM CHLORIDE 0.9 % (FLUSH) 0.9 %
5-40 SYRINGE (ML) INJECTION EVERY 12 HOURS SCHEDULED
OUTPATIENT
Start: 2023-04-19

## 2023-04-19 RX ORDER — SODIUM CHLORIDE 0.9 % (FLUSH) 0.9 %
5-40 SYRINGE (ML) INJECTION PRN
OUTPATIENT
Start: 2023-04-19

## 2023-04-19 RX ORDER — DOXYCYCLINE HYCLATE 100 MG
100 TABLET ORAL 2 TIMES DAILY
Qty: 14 TABLET | Refills: 0 | Status: SHIPPED | OUTPATIENT
Start: 2023-04-19 | End: 2023-04-26

## 2023-04-19 RX ORDER — SODIUM CHLORIDE 9 MG/ML
INJECTION, SOLUTION INTRAVENOUS PRN
OUTPATIENT
Start: 2023-04-19

## 2023-04-19 NOTE — PROGRESS NOTES
General Surgery History and Physical  [facility]    Patient's Name/Date of Birth: Kolby Scott / 1960    Date: April 19, 2023     Surgeon: Isamar Pollock M.D.    PCP: Zoya Trevino MD     Chief Complaint: soft tissue neoplasm of the left neck    HPI:   Kolby Scott is a 58 y.o. female who presents for evaluation of soft tissue neoplasm of the left neck. Timing is constant, radiation to left neck, alleviated by none and started months ago and severity is 7/10. No drainage, some pain. Denies similar in the past. No fever, chills. Denies previous at the same site. Would like to have removed. Has not been treated with antibiotics. Has not had drained previously. She has had similar epidermoid cysts in past.     Patient Active Problem List   Diagnosis    HTN (hypertension), benign    Hyperlipemia    Hypokalemia    Back pain    Soft tissue mass       Past Medical History:   Diagnosis Date    Back pain     COVID 2020    H/O cardiovascular stress test 03/10/2017    lexiscan,    Hyperlipidemia     Hypertension     PONV (postoperative nausea and vomiting)        Past Surgical History:   Procedure Laterality Date    ABDOMEN SURGERY N/A 1/25/2023    EXCISION SOFT TISSUE NEOPLASM RIGHT LOWER BACK AND MIDDLE BACK performed by Isamar Pollock MD at [address]  2009    [address] Right     x3    FOOT SURGERY Left     x1    HIP SURGERY Left 10/18/2022    replacment    HYSTERECTOMY (CERVIX STATUS UNKNOWN)      partial    OTHER SURGICAL HISTORY  02/24/2017    Excision of soft tissue neoplasm upper back    PARTIAL HYSTERECTOMY (CERVIX NOT REMOVED)  2003       Allergies   Allergen Reactions    Penicillins Hives       The patient has a family history that is negative for severe cardiovascular or respiratory issues, negative for reaction to anesthesia. Patient did not report any history of skin cancer in their mother or father.     Time spent

## 2023-04-19 NOTE — TELEPHONE ENCOUNTER
Per Dr. Arnol Blackburn, patient is scheduled for Excision soft tissue neoplasm left neck  at [facility] on 23. Surgery scheduled via [facility], surgeon's calendar updated. Dr. Arnol Blackburn to enter orders. Follow up appointment scheduled. Electronically signed by Rachel Hsu RN on 2023 at 8:38 AM    Prior Authorization Form:      DEMOGRAPHICS:                     Patient Name:  Beverly Marquis  Patient :  1960            Insurance:  Payor: [employer] / Plan: [employer] / Product Type: *No Product type* /   Insurance ID Number:    Payer/Plan Subscr  Sex Relation Sub. Ins. ID Effective Group Num   1.  [address] 1960 Female Self [id number] 1/1/15 [id number]                                    [id number]         DIAGNOSIS & PROCEDURE:                       Procedure/Operation: Excision soft tissue neoplasm left neck            CPT Code: 98157    Diagnosis:  soft tissue mass    ICD10 Code: R22.2    Location:  [facility]    Surgeon:  Haley Yanez INFORMATION:                          Date: 23    Time: TBD              Anesthesia:  MAC/TIVA                                                       Status:  Outpatient        Special Comments:         Electronically signed by Rachel Hsu RN on 2023 at 8:38 AM

## 2023-04-20 ENCOUNTER — ANESTHESIA EVENT (OUTPATIENT)
Dept: OPERATING ROOM | Age: 63
End: 2023-04-20
Payer: COMMERCIAL

## 2023-04-20 ASSESSMENT — LIFESTYLE VARIABLES: SMOKING_STATUS: 0

## 2023-04-26 ENCOUNTER — ANESTHESIA (OUTPATIENT)
Dept: OPERATING ROOM | Age: 63
End: 2023-04-26
Payer: COMMERCIAL

## 2023-04-26 ENCOUNTER — HOSPITAL ENCOUNTER (OUTPATIENT)
Age: 63
Setting detail: OUTPATIENT SURGERY
Discharge: HOME | End: 2023-04-26
Attending: SURGERY | Admitting: SURGERY
Payer: COMMERCIAL

## 2023-04-26 VITALS
DIASTOLIC BLOOD PRESSURE: 60 MMHG | WEIGHT: 221 LBS | RESPIRATION RATE: 16 BRPM | BODY MASS INDEX: 33.49 KG/M2 | SYSTOLIC BLOOD PRESSURE: 113 MMHG | OXYGEN SATURATION: 97 % | HEIGHT: 68 IN | TEMPERATURE: 98 F | HEART RATE: 61 BPM

## 2023-04-26 DIAGNOSIS — M79.89 SOFT TISSUE MASS: ICD-10-CM

## 2023-04-26 PROCEDURE — 3600000003 HC SURGERY LEVEL 3 BASE: Performed by: SURGERY

## 2023-04-26 PROCEDURE — 2580000003 HC RX 258: Performed by: ANESTHESIOLOGY

## 2023-04-26 PROCEDURE — 21556 EXC NECK TUM DEEP < 5 CM: CPT | Performed by: SURGERY

## 2023-04-26 PROCEDURE — 2500000003 HC RX 250 WO HCPCS: Performed by: SURGERY

## 2023-04-26 PROCEDURE — 11200 RMVL SKIN TAGS UP TO&INC 15: CPT | Performed by: SURGERY

## 2023-04-26 PROCEDURE — 7100000011 HC PHASE II RECOVERY - ADDTL 15 MIN: Performed by: SURGERY

## 2023-04-26 PROCEDURE — 7100000010 HC PHASE II RECOVERY - FIRST 15 MIN: Performed by: SURGERY

## 2023-04-26 PROCEDURE — 3600000013 HC SURGERY LEVEL 3 ADDTL 15MIN: Performed by: SURGERY

## 2023-04-26 PROCEDURE — 6360000002 HC RX W HCPCS: Performed by: NURSE ANESTHETIST, CERTIFIED REGISTERED

## 2023-04-26 PROCEDURE — 3700000000 HC ANESTHESIA ATTENDED CARE: Performed by: SURGERY

## 2023-04-26 PROCEDURE — 2500000003 HC RX 250 WO HCPCS: Performed by: NURSE ANESTHETIST, CERTIFIED REGISTERED

## 2023-04-26 PROCEDURE — 2709999900 HC NON-CHARGEABLE SUPPLY: Performed by: SURGERY

## 2023-04-26 PROCEDURE — 88304 TISSUE EXAM BY PATHOLOGIST: CPT

## 2023-04-26 PROCEDURE — 88305 TISSUE EXAM BY PATHOLOGIST: CPT

## 2023-04-26 PROCEDURE — 3700000001 HC ADD 15 MINUTES (ANESTHESIA): Performed by: SURGERY

## 2023-04-26 RX ORDER — LIDOCAINE HYDROCHLORIDE 20 MG/ML
INJECTION, SOLUTION INTRAVENOUS PRN
Status: DISCONTINUED | OUTPATIENT
Start: 2023-04-26 | End: 2023-04-26 | Stop reason: SDUPTHER

## 2023-04-26 RX ORDER — SODIUM CHLORIDE 0.9 % (FLUSH) 0.9 %
5-40 SYRINGE (ML) INJECTION PRN
Status: DISCONTINUED | OUTPATIENT
Start: 2023-04-26 | End: 2023-04-26 | Stop reason: HOSPADM

## 2023-04-26 RX ORDER — DOXYCYCLINE HYCLATE 100 MG
100 TABLET ORAL 2 TIMES DAILY
Qty: 10 TABLET | Refills: 0 | Status: SHIPPED | OUTPATIENT
Start: 2023-04-26 | End: 2023-05-01

## 2023-04-26 RX ORDER — ONDANSETRON 2 MG/ML
INJECTION INTRAMUSCULAR; INTRAVENOUS PRN
Status: DISCONTINUED | OUTPATIENT
Start: 2023-04-26 | End: 2023-04-26 | Stop reason: SDUPTHER

## 2023-04-26 RX ORDER — DIPHENHYDRAMINE HYDROCHLORIDE 50 MG/ML
INJECTION INTRAMUSCULAR; INTRAVENOUS PRN
Status: DISCONTINUED | OUTPATIENT
Start: 2023-04-26 | End: 2023-04-26 | Stop reason: SDUPTHER

## 2023-04-26 RX ORDER — PROPOFOL 10 MG/ML
INJECTION, EMULSION INTRAVENOUS CONTINUOUS PRN
Status: DISCONTINUED | OUTPATIENT
Start: 2023-04-26 | End: 2023-04-26 | Stop reason: SDUPTHER

## 2023-04-26 RX ORDER — SODIUM CHLORIDE 9 MG/ML
INJECTION, SOLUTION INTRAVENOUS PRN
Status: DISCONTINUED | OUTPATIENT
Start: 2023-04-26 | End: 2023-04-26 | Stop reason: HOSPADM

## 2023-04-26 RX ORDER — FENTANYL CITRATE 50 UG/ML
INJECTION, SOLUTION INTRAMUSCULAR; INTRAVENOUS PRN
Status: DISCONTINUED | OUTPATIENT
Start: 2023-04-26 | End: 2023-04-26 | Stop reason: SDUPTHER

## 2023-04-26 RX ORDER — KETAMINE HYDROCHLORIDE 10 MG/ML
INJECTION INTRAMUSCULAR; INTRAVENOUS PRN
Status: DISCONTINUED | OUTPATIENT
Start: 2023-04-26 | End: 2023-04-26 | Stop reason: SDUPTHER

## 2023-04-26 RX ORDER — SODIUM CHLORIDE 0.9 % (FLUSH) 0.9 %
5-40 SYRINGE (ML) INJECTION EVERY 12 HOURS SCHEDULED
Status: DISCONTINUED | OUTPATIENT
Start: 2023-04-26 | End: 2023-04-26 | Stop reason: HOSPADM

## 2023-04-26 RX ORDER — IBUPROFEN 800 MG/1
800 TABLET ORAL EVERY 8 HOURS PRN
Qty: 30 TABLET | Refills: 0 | Status: SHIPPED | OUTPATIENT
Start: 2023-04-26 | End: 2024-05-15

## 2023-04-26 RX ORDER — MIDAZOLAM HYDROCHLORIDE 1 MG/ML
INJECTION INTRAMUSCULAR; INTRAVENOUS PRN
Status: DISCONTINUED | OUTPATIENT
Start: 2023-04-26 | End: 2023-04-26 | Stop reason: SDUPTHER

## 2023-04-26 RX ORDER — SODIUM CHLORIDE, SODIUM LACTATE, POTASSIUM CHLORIDE, CALCIUM CHLORIDE 600; 310; 30; 20 MG/100ML; MG/100ML; MG/100ML; MG/100ML
INJECTION, SOLUTION INTRAVENOUS CONTINUOUS
Status: DISCONTINUED | OUTPATIENT
Start: 2023-04-26 | End: 2023-04-26 | Stop reason: HOSPADM

## 2023-04-26 RX ADMIN — KETAMINE HYDROCHLORIDE 20 MG: 10 INJECTION INTRAMUSCULAR; INTRAVENOUS at 06:31

## 2023-04-26 RX ADMIN — SODIUM CHLORIDE, POTASSIUM CHLORIDE, SODIUM LACTATE AND CALCIUM CHLORIDE: 600; 310; 30; 20 INJECTION, SOLUTION INTRAVENOUS at 05:44

## 2023-04-26 RX ADMIN — LIDOCAINE HYDROCHLORIDE 30 MG: 20 INJECTION, SOLUTION INTRAVENOUS at 06:27

## 2023-04-26 RX ADMIN — DIPHENHYDRAMINE HYDROCHLORIDE 12.5 MG: 50 INJECTION, SOLUTION INTRAMUSCULAR; INTRAVENOUS at 06:27

## 2023-04-26 RX ADMIN — ONDANSETRON 4 MG: 2 INJECTION INTRAMUSCULAR; INTRAVENOUS at 06:27

## 2023-04-26 RX ADMIN — MIDAZOLAM 2 MG: 1 INJECTION INTRAMUSCULAR; INTRAVENOUS at 06:22

## 2023-04-26 RX ADMIN — FENTANYL CITRATE 50 MCG: 50 INJECTION INTRAMUSCULAR; INTRAVENOUS at 06:30

## 2023-04-26 RX ADMIN — PROPOFOL 100 MCG/KG/MIN: 10 INJECTION, EMULSION INTRAVENOUS at 06:27

## 2023-04-26 ASSESSMENT — PAIN SCALES - GENERAL
PAINLEVEL_OUTOF10: 0

## 2023-04-26 ASSESSMENT — PAIN - FUNCTIONAL ASSESSMENT: PAIN_FUNCTIONAL_ASSESSMENT: 0-10

## 2023-04-26 NOTE — OP NOTE
DATE OF PROCEDURE: 4/26/2023    Operative Note  [facility]     Sonia Jaramillo  [id number]    SURGEON: Sierra Dent MD     ASSISTANT: First Azael Gomez    PREOPERATIVE DIAGNOSIS: Soft tissue neoplasm of left nec with recurrent infections and pain. Multiple skin tags of the chest and neck    POSTOPERATIVE DIAGNOSIS: same. OPERATION: Excision of soft tissue neoplasm of left neck, subfascial, 3cm X 1cm. Skin tag removal x3 left neck, chest and right neck    ANESTHESIA: LMAC and local.     ESTIMATED BLOOD LOSS: Minimal.     COMPLICATIONS: None. FLUIDS: Crystalloid. DISPOSITION: Discharged home. SPECIMEN: Soft tissue neoplasm left neck, skin tags. PROCEDURE: The patient was brought into the operative suite and was placed under [facility] anesthesia; was infused with local anesthetic after being prepped and draped in a normal sterile condition. Once this was done, approximately a 3-cm elliptical incision was made in the left neck. Once this was done, the incision was carried down through the dermis with electrocautery. We dissected down through the subcutaneous tissue to the level of the fascia entered into the fascia but not below into the muscle belly. We had to dissect completely around and excised an intramuscular and subfascial component in order to completely remove the mass. The mass measured 3cm x 1cm. This was then delivered and sent for specimen. The wound was sterilely irrigated, and electrocautery was used to obtain hemostasis. Once this was done, deep dermal stitches were placed interrupted 3-0 Vicryl deep dermal stitches to approximate the skin. Finally, surgical glue was placed. Skin tags on the left neck, left chest and right chest were removed with cautery and glue applied over the top and the patient was woken up in stable condition and taken to PACU.      Sierra Dent MD, MS  Minimally Invasive and Bariatric Surgery  [phone]

## 2023-04-26 NOTE — DISCHARGE INSTRUCTIONS
Patient Discharge Instructions  Discharge Date:  4/26/2023    Discharged To: Home    RESUME ACTIVITY:      BATHING:  May shower 24hrs after surgery, remove dressings after 24hrs if in place, leave steristrips in place as they will fall of independently. You may have adhesive glue covering your incisions which will dissolve on it own. May bathe or swim 5 days after surgery    DRIVING: No driving while on pain medications    RETURN TO WORK: As tolerated     WALKING:  Yes    SEXUAL ACTIVITY: Yes    STAIRS:  Yes    LIFTING: As tolerated     DIET: General adult    SPECIAL INSTRUCTIONS:     Call physician if they or any other problems occur:  Fever over 101°    Redness, swelling, hardness or warmth at the operative site  Unrelieved nausea    Foul smelling or cloudy drainage at the operative site   Unrelieved pain    Blood soaked dressing. (Some oozing may be normal)    Call office for follow up appointment with Dr Bethanne Siemens in 2 weeks. Call the office at [phone] if you have a fever > 100 F, or if your incision becomes red, tender, or drains more than a small amount of clear fluid. BOWELS: constipation is a side effect of your pain meds, take a daily laxative (miralax, dulcolax, etc.) as needed to keep your bowels moving as they normally do, do not go 2-3 days without having a bowel movement. Pain medications; Tylenol 500mg oral every 6hrs over the counter should be sufficient for pain control. Ok to take 800mg Ibuprofen every 6hrs with food if pain not controlled with Tylenol or adverse reaction in the past.   Percocet- take at least 1/2 pill every 6 hours the first 36 hours after surgery as needed for pain, and may take as many as 2 pills every 6 hours. After the first 36hours only take the pills as needed and stop them as soon as possible. Pain meds cause constipation so pay close attention to the \"bowels\" topic above. Keep incisions clean and dry.   Vicodin/Percocet and ibuprofen for pain as

## 2023-04-26 NOTE — ANESTHESIA POSTPROCEDURE EVALUATION
Department of Anesthesiology  Postprocedure Note    Patient: Ramona Simpson  MRN: [id number]  YOB: 1960  Date of evaluation: 4/26/2023      Procedure Summary     Date: 04/26/23 Room / Location: [address] [place] [address] 01 / [address]    Anesthesia Start: 8296 Anesthesia Stop: 9609    Procedure: EXCISION SOFT TISSUE NEOPLASM LEFT NECK (Left) Diagnosis:       Soft tissue mass      (Soft tissue mass [M79.89])    Surgeons: El Call MD Responsible Provider: Chano Pelayo MD    Anesthesia Type: MAC ASA Status: 3          Anesthesia Type: MAC    Helio Phase I: Helio Score: 10    Helio Phase II: Helio Score: 10      Anesthesia Post Evaluation    Patient location during evaluation: PACU  Patient participation: complete - patient participated  Level of consciousness: awake  Airway patency: patent  Nausea & Vomiting: no nausea and no vomiting  Complications: no  Cardiovascular status: hemodynamically stable  Respiratory status: acceptable  Hydration status: euvolemic

## 2023-04-26 NOTE — H&P
General Surgery History and Physical  [facility]    Patient's Name/Date of Birth: Leigh Kiser / 1960    Date: April 26, 2023     Surgeon: Flavia Bonilla M.D.    PCP: Kristal Saucedo MD     Chief Complaint: soft tissue neoplasm of the left neck    HPI:   Leigh Kiser is a 58 y.o. female who presents for evaluation of soft tissue neoplasm of the left neck. Timing is constant, radiation to left neck, alleviated by none and started months ago and severity is 7/10. No drainage, some pain. Denies similar in the past. No fever, chills. Denies previous at the same site. Would like to have removed. Has not been treated with antibiotics. Has not had drained previously. She has had similar epidermoid cysts in past.     She has requested removal of additional skin tags on her chest and left neck as they are painful and catching on clothing and bleeding intermittently.     Patient Active Problem List   Diagnosis    HTN (hypertension), benign    Hyperlipemia    Hypokalemia    Back pain    Soft tissue mass       Past Medical History:   Diagnosis Date    Back pain     COVID 2020    H/O cardiovascular stress test 03/10/2017    lexiscan,    Hyperlipidemia     Hypertension     PONV (postoperative nausea and vomiting)     general anesthesia       Past Surgical History:   Procedure Laterality Date    ABDOMEN SURGERY N/A 01/25/2023    EXCISION SOFT TISSUE NEOPLASM RIGHT LOWER BACK AND MIDDLE BACK performed by Flavia Bonilla MD at [address] [id number] [facility]  04/2023    FOOT SURGERY Right     x3    FOOT SURGERY Left     x1    HIP SURGERY Left 10/18/2022    replacment    HYSTERECTOMY (CERVIX STATUS UNKNOWN)      partial    OTHER SURGICAL HISTORY  02/24/2017    Excision of soft tissue neoplasm upper back    PARTIAL HYSTERECTOMY (CERVIX NOT REMOVED)  2003       Allergies   Allergen Reactions    Penicillins Hives       The patient has a family history that is

## 2023-05-02 ENCOUNTER — TELEPHONE (OUTPATIENT)
Dept: FAMILY MEDICINE CLINIC | Age: 63
End: 2023-05-02

## 2023-05-03 RX ORDER — POTASSIUM CHLORIDE 750 MG/1
TABLET, FILM COATED, EXTENDED RELEASE ORAL
Qty: 180 TABLET | Refills: 0 | OUTPATIENT
Start: 2023-05-03

## 2023-05-07 ENCOUNTER — HOSPITAL ENCOUNTER (OUTPATIENT)
Age: 63
Discharge: HOME | End: 2023-05-07
Payer: COMMERCIAL

## 2023-05-07 DIAGNOSIS — E78.2 MIXED HYPERLIPIDEMIA: ICD-10-CM

## 2023-05-07 DIAGNOSIS — R73.9 HYPERGLYCEMIA: ICD-10-CM

## 2023-05-07 DIAGNOSIS — I10 HTN (HYPERTENSION), BENIGN: ICD-10-CM

## 2023-05-07 LAB
ALBUMIN SERPL-MCNC: 3.7 G/DL (ref 3.5–5.2)
ALP SERPL-CCNC: 75 U/L (ref 35–104)
ALT SERPL-CCNC: 20 U/L (ref 0–32)
ANION GAP SERPL CALCULATED.3IONS-SCNC: 11 MMOL/L (ref 7–16)
AST SERPL-CCNC: 31 U/L (ref 0–31)
BASOPHILS # BLD: 0.03 E9/L (ref 0–0.2)
BASOPHILS NFR BLD: 0.8 % (ref 0–2)
BILIRUB SERPL-MCNC: 1.1 MG/DL (ref 0–1.2)
BUN SERPL-MCNC: 15 MG/DL (ref 6–23)
CALCIUM SERPL-MCNC: 9 MG/DL (ref 8.6–10.2)
CHLORIDE SERPL-SCNC: 107 MMOL/L (ref 98–107)
CHOLESTEROL, TOTAL: 138 MG/DL (ref 0–199)
CO2 SERPL-SCNC: 26 MMOL/L (ref 22–29)
CREAT SERPL-MCNC: 0.7 MG/DL (ref 0.5–1)
EOSINOPHIL # BLD: 0.16 E9/L (ref 0.05–0.5)
EOSINOPHIL NFR BLD: 4.2 % (ref 0–6)
ERYTHROCYTE [DISTWIDTH] IN BLOOD BY AUTOMATED COUNT: 12.4 FL (ref 11.5–15)
GLUCOSE SERPL-MCNC: 99 MG/DL (ref 74–99)
HBA1C MFR BLD: 5.3 % (ref 4–5.6)
HCT VFR BLD AUTO: 38.9 % (ref 34–48)
HDLC SERPL-MCNC: 52 MG/DL
HGB BLD-MCNC: 13.4 G/DL (ref 11.5–15.5)
IMM GRANULOCYTES # BLD: 0.01 E9/L
IMM GRANULOCYTES NFR BLD: 0.3 % (ref 0–5)
LDLC SERPL CALC-MCNC: 73 MG/DL (ref 0–99)
LYMPHOCYTES # BLD: 1.06 E9/L (ref 1.5–4)
LYMPHOCYTES NFR BLD: 27.9 % (ref 20–42)
MCH RBC QN AUTO: 29.3 PG (ref 26–35)
MCHC RBC AUTO-ENTMCNC: 34.4 % (ref 32–34.5)
MCV RBC AUTO: 84.9 FL (ref 80–99.9)
MONOCYTES # BLD: 0.23 E9/L (ref 0.1–0.95)
MONOCYTES NFR BLD: 6.1 % (ref 2–12)
NEUTROPHILS # BLD: 2.31 E9/L (ref 1.8–7.3)
NEUTS SEG NFR BLD: 60.7 % (ref 43–80)
PLATELET # BLD AUTO: 149 E9/L (ref 130–450)
PMV BLD AUTO: 12.1 FL (ref 7–12)
POTASSIUM SERPL-SCNC: 3.3 MMOL/L (ref 3.5–5)
PROT SERPL-MCNC: 6.2 G/DL (ref 6.4–8.3)
RBC # BLD AUTO: 4.58 E12/L (ref 3.5–5.5)
SODIUM SERPL-SCNC: 144 MMOL/L (ref 132–146)
TRIGL SERPL-MCNC: 63 MG/DL (ref 0–149)
VLDLC SERPL CALC-MCNC: 13 MG/DL
WBC # BLD: 3.8 E9/L (ref 4.5–11.5)

## 2023-05-07 PROCEDURE — 85025 COMPLETE CBC W/AUTO DIFF WBC: CPT

## 2023-05-07 PROCEDURE — 36415 COLL VENOUS BLD VENIPUNCTURE: CPT

## 2023-05-07 PROCEDURE — 80061 LIPID PANEL: CPT

## 2023-05-07 PROCEDURE — 80053 COMPREHEN METABOLIC PANEL: CPT

## 2023-05-07 PROCEDURE — 83036 HEMOGLOBIN GLYCOSYLATED A1C: CPT

## 2023-05-15 ENCOUNTER — OFFICE VISIT (OUTPATIENT)
Dept: FAMILY MEDICINE CLINIC | Age: 63
End: 2023-05-15
Payer: COMMERCIAL

## 2023-05-15 VITALS
WEIGHT: 220 LBS | DIASTOLIC BLOOD PRESSURE: 78 MMHG | OXYGEN SATURATION: 97 % | BODY MASS INDEX: 33.45 KG/M2 | SYSTOLIC BLOOD PRESSURE: 124 MMHG | HEART RATE: 84 BPM | TEMPERATURE: 97.3 F

## 2023-05-15 DIAGNOSIS — R73.9 HYPERGLYCEMIA: ICD-10-CM

## 2023-05-15 DIAGNOSIS — E78.2 MIXED HYPERLIPIDEMIA: ICD-10-CM

## 2023-05-15 DIAGNOSIS — E87.6 HYPOKALEMIA: Primary | ICD-10-CM

## 2023-05-15 DIAGNOSIS — I10 HTN (HYPERTENSION), BENIGN: ICD-10-CM

## 2023-05-15 PROCEDURE — 3078F DIAST BP <80 MM HG: CPT | Performed by: FAMILY MEDICINE

## 2023-05-15 PROCEDURE — 99214 OFFICE O/P EST MOD 30 MIN: CPT | Performed by: FAMILY MEDICINE

## 2023-05-15 PROCEDURE — 3074F SYST BP LT 130 MM HG: CPT | Performed by: FAMILY MEDICINE

## 2023-05-15 RX ORDER — TRIAMTERENE AND HYDROCHLOROTHIAZIDE 37.5; 25 MG/1; MG/1
TABLET ORAL
Qty: 90 TABLET | Refills: 1 | Status: SHIPPED | OUTPATIENT
Start: 2023-05-15

## 2023-05-15 RX ORDER — SIMVASTATIN 10 MG
10 TABLET ORAL NIGHTLY
Qty: 90 TABLET | Refills: 1 | Status: SHIPPED | OUTPATIENT
Start: 2023-05-15

## 2023-05-15 RX ORDER — POTASSIUM CHLORIDE 750 MG/1
10 TABLET, FILM COATED, EXTENDED RELEASE ORAL 3 TIMES DAILY
Qty: 180 TABLET | Refills: 1 | Status: SHIPPED | OUTPATIENT
Start: 2023-05-15

## 2023-05-15 RX ORDER — POTASSIUM CHLORIDE 750 MG/1
10 TABLET, FILM COATED, EXTENDED RELEASE ORAL 2 TIMES DAILY
Qty: 180 TABLET | Refills: 1 | Status: SHIPPED
Start: 2023-05-15 | End: 2023-05-15 | Stop reason: SDUPTHER

## 2023-05-15 ASSESSMENT — ENCOUNTER SYMPTOMS
EYES NEGATIVE: 1
RESPIRATORY NEGATIVE: 1
BACK PAIN: 1
GASTROINTESTINAL NEGATIVE: 1
ALLERGIC/IMMUNOLOGIC NEGATIVE: 1

## 2023-05-15 NOTE — PATIENT INSTRUCTIONS
Continue present treatment  Take potassium 3 times a day  Lab work within the next visit  Low-sodium low-fat low-carb diet  Regular exercises  Return to clinic earlier if any problems

## 2023-05-15 NOTE — PROGRESS NOTES
OFFICE PROGRESS NOTE      SUBJECTIVE:        Patient ID:   Ramona Simpson is a 58 y.o. female who presents for   Chief Complaint   Patient presents with    Hypertension     Here for recheck on Hypertension and Hyperlipidemia. Lab work done,here for review. HPI:   Patient is here for the follow-up  Complaining of back pain in the right foot. Lab work showed potassium is 3.3  Patient does follow the diet and exercise          Prior to Visit Medications    Medication Sig Taking? Authorizing Provider   triamterene-hydroCHLOROthiazide (MAXZIDE-25) 37.5-25 MG per tablet TAKE ONE TABLET BY MOUTH DAILY Yes Lynne Soriano MD   simvastatin (ZOCOR) 10 MG tablet Take 1 tablet by mouth nightly Yes Lynne Soriano MD   potassium chloride (KLOR-CON) 10 MEQ extended release tablet Take 1 tablet by mouth 2 times daily Yes Lynne Soriano MD   ibuprofen (IBU) 800 MG tablet Take 1 tablet by mouth every 8 hours as needed for Pain Yes El Call MD   HYDROcodone-acetaminophen (NORCO)  MG per tablet Take 1 tablet by mouth daily as needed for Pain. Yes Historical Provider, MD   NONFORMZANE Villagomez doxycycline as needed for any dentist appts.   Instructed to take 2 morning of those procedures Yes Historical Provider, MD   Coenzyme Q10 (CO Q 10 PO) Take 200 mg by mouth Yes Historical Provider, MD   Cholecalciferol (VITAMIN D3) 125 MCG (5000 UT) TABS Take by mouth Yes Historical Provider, MD   methocarbamol (ROBAXIN) 750 MG tablet Take 1 tablet by mouth 2 times daily as needed (for pain (may cause drowsiness)) Yes Betty Norris PA-C      Social History     Socioeconomic History    Marital status:      Spouse name: None    Number of children: None    Years of education: None    Highest education level: None   Tobacco Use    Smoking status: Former     Packs/day: 0.30     Years: 15.00     Pack years: 4.50     Types: Cigarettes     Quit date: 2/7/1989     Years

## 2023-05-16 ENCOUNTER — TRANSCRIBE ORDERS (OUTPATIENT)
Dept: ADMINISTRATIVE | Age: 63
End: 2023-05-16

## 2023-05-16 DIAGNOSIS — Z12.31 ENCOUNTER FOR SCREENING MAMMOGRAM FOR MALIGNANT NEOPLASM OF BREAST: Primary | ICD-10-CM

## 2023-05-17 ENCOUNTER — OFFICE VISIT (OUTPATIENT)
Dept: SURGERY | Age: 63
End: 2023-05-17

## 2023-05-17 VITALS
DIASTOLIC BLOOD PRESSURE: 86 MMHG | HEIGHT: 68 IN | WEIGHT: 220 LBS | BODY MASS INDEX: 33.34 KG/M2 | TEMPERATURE: 97.5 F | SYSTOLIC BLOOD PRESSURE: 142 MMHG | HEART RATE: 68 BPM

## 2023-05-17 DIAGNOSIS — L72.0 EPIDERMOID CYST OF SKIN OF BACK: Primary | ICD-10-CM

## 2023-05-17 PROCEDURE — 99024 POSTOP FOLLOW-UP VISIT: CPT | Performed by: SURGERY

## 2023-05-17 NOTE — PROGRESS NOTES
General Surgery Office Note  Jeremy Garcia MD, MS    Patient's Name/Date of Birth: Jayne Hazard / 1960    Date: May 17, 2023     Surgeon: Satish Mello MD    Chief Complaint: s/p excision soft tissue neoplasm of the neck      Patient Active Problem List   Diagnosis    HTN (hypertension), benign    Hyperlipemia    Hypokalemia    Back pain    Soft tissue mass       Subjective: Doing well, no pain, incision well healed, no seroma, no new complaints    Objective:  BP (!) 142/86   Pulse 68   Temp 97.5 °F (36.4 °C) (Temporal)   Ht 5' 8\" (1.727 m)   Wt 220 lb (99.8 kg)   BMI 33.45 kg/m²   Labs:  No results for input(s): WBC, HGB, HCT in the last 72 hours. Invalid input(s): PLR  Lab Results   Component Value Date    CREATININE 0.7 05/07/2023    BUN 15 05/07/2023     05/07/2023    K 3.3 (L) 05/07/2023     05/07/2023    CO2 26 05/07/2023     No results for input(s): LIPASE, AMYLASE in the last 72 hours. General appearance: AA, NAD  HEENT: NCAT, PERRLA, EOMI  Lungs: Clear, equal rise bilateral  Heart: Reg  Abdomen: soft, nondistended, nontender  Skin: No lesions, incisions well healed   Psych: No distress, conversive, no hallucinations  : No ulcers or lesions  Rectal: No bleeding    A complete 10 system review was performed and are otherwise negative unless mentioned in the above HPI. Specific negatives are listed below but may not include all those reviewed.     General ROS: negative obtundation, AMS  ENT ROS: negative rhinorrhea, epistaxis  Allergy and Immunology ROS: negative itchy/watery eyes or nasal congestion  Hematological and Lymphatic ROS: negative spontaneous bleeding or bruising  Endocrine ROS: negative  lethargy, mood swings, palpitations or polydipsia/polyuria  Respiratory ROS: negative sputum changes, stridor, tachypnea or wheezing  Cardiovascular ROS: negative for - loss of consciousness, murmur or orthopnea  Gastrointestinal ROS: negative for -

## 2023-06-10 ENCOUNTER — HOSPITAL ENCOUNTER (OUTPATIENT)
Age: 63
Discharge: HOME | End: 2023-06-10
Payer: COMMERCIAL

## 2023-06-10 DIAGNOSIS — E87.6 HYPOKALEMIA: ICD-10-CM

## 2023-06-10 LAB
ALBUMIN SERPL-MCNC: 3.8 G/DL (ref 3.5–5.2)
ALP SERPL-CCNC: 76 U/L (ref 35–104)
ALT SERPL-CCNC: 21 U/L (ref 0–32)
ANION GAP SERPL CALCULATED.3IONS-SCNC: 9 MMOL/L (ref 7–16)
AST SERPL-CCNC: 32 U/L (ref 0–31)
BILIRUB SERPL-MCNC: 0.8 MG/DL (ref 0–1.2)
BUN SERPL-MCNC: 14 MG/DL (ref 6–23)
CALCIUM SERPL-MCNC: 9.2 MG/DL (ref 8.6–10.2)
CHLORIDE SERPL-SCNC: 106 MMOL/L (ref 98–107)
CO2 SERPL-SCNC: 26 MMOL/L (ref 22–29)
CREAT SERPL-MCNC: 0.8 MG/DL (ref 0.5–1)
GLUCOSE SERPL-MCNC: 96 MG/DL (ref 74–99)
POTASSIUM SERPL-SCNC: 3.8 MMOL/L (ref 3.5–5)
PROT SERPL-MCNC: 6.6 G/DL (ref 6.4–8.3)
SODIUM SERPL-SCNC: 141 MMOL/L (ref 132–146)

## 2023-06-10 PROCEDURE — 80053 COMPREHEN METABOLIC PANEL: CPT

## 2023-06-10 PROCEDURE — 36415 COLL VENOUS BLD VENIPUNCTURE: CPT

## 2023-07-05 ENCOUNTER — HOSPITAL ENCOUNTER (OUTPATIENT)
Dept: MAMMOGRAPHY | Age: 63
Discharge: HOME | End: 2023-07-07
Attending: LEGAL MEDICINE
Payer: COMMERCIAL

## 2023-07-05 DIAGNOSIS — Z12.31 ENCOUNTER FOR SCREENING MAMMOGRAM FOR MALIGNANT NEOPLASM OF BREAST: ICD-10-CM

## 2023-07-05 PROCEDURE — 77063 BREAST TOMOSYNTHESIS BI: CPT

## 2023-08-19 ENCOUNTER — HOSPITAL ENCOUNTER (OUTPATIENT)
Age: 63
Discharge: HOME | End: 2023-08-19
Payer: COMMERCIAL

## 2023-08-19 DIAGNOSIS — R73.9 HYPERGLYCEMIA: ICD-10-CM

## 2023-08-19 DIAGNOSIS — E87.6 HYPOKALEMIA: ICD-10-CM

## 2023-08-19 DIAGNOSIS — E78.2 MIXED HYPERLIPIDEMIA: ICD-10-CM

## 2023-08-19 DIAGNOSIS — I10 HTN (HYPERTENSION), BENIGN: ICD-10-CM

## 2023-08-19 LAB
ALBUMIN SERPL-MCNC: 3.7 G/DL (ref 3.5–5.2)
ALP SERPL-CCNC: 83 U/L (ref 35–104)
ALT SERPL-CCNC: 20 U/L (ref 0–32)
ANION GAP SERPL CALCULATED.3IONS-SCNC: 12 MMOL/L (ref 7–16)
AST SERPL-CCNC: 38 U/L (ref 0–31)
BASOPHILS # BLD: 0.05 K/UL (ref 0–0.2)
BASOPHILS NFR BLD: 1 % (ref 0–2)
BILIRUB SERPL-MCNC: 1.1 MG/DL (ref 0–1.2)
BUN SERPL-MCNC: 13 MG/DL (ref 6–23)
CALCIUM SERPL-MCNC: 9.5 MG/DL (ref 8.6–10.2)
CHLORIDE SERPL-SCNC: 103 MMOL/L (ref 98–107)
CHOLEST SERPL-MCNC: 144 MG/DL
CO2 SERPL-SCNC: 23 MMOL/L (ref 22–29)
CREAT SERPL-MCNC: 0.8 MG/DL (ref 0.5–1)
EOSINOPHIL # BLD: 0.18 K/UL (ref 0.05–0.5)
EOSINOPHILS RELATIVE PERCENT: 4 % (ref 0–6)
ERYTHROCYTE [DISTWIDTH] IN BLOOD BY AUTOMATED COUNT: 12.1 % (ref 12–16)
GFR SERPL CREATININE-BSD FRML MDRD: >60 ML/MIN/1.73M2
GLUCOSE SERPL-MCNC: 92 MG/DL (ref 74–99)
HCT VFR BLD AUTO: 41.8 % (ref 34–48)
HDLC SERPL-MCNC: 48 MG/DL
HGB BLD-MCNC: 14.3 G/DL (ref 11.5–15.5)
IMM GRANULOCYTES # BLD AUTO: <0.03 K/UL (ref 0–0.58)
IMM GRANULOCYTES NFR BLD: 0 % (ref 0–5)
LDLC SERPL CALC-MCNC: 82 MG/DL
LYMPHOCYTES NFR BLD: 1.06 K/UL (ref 1.5–4)
LYMPHOCYTES RELATIVE PERCENT: 23 % (ref 20–42)
MCH RBC QN AUTO: 28.9 PG (ref 26–35)
MCHC RBC AUTO-ENTMCNC: 34.2 G/DL (ref 32–34.5)
MCV RBC AUTO: 84.6 FL (ref 80–99.9)
MONOCYTES NFR BLD: 0.37 K/UL (ref 0.1–0.95)
MONOCYTES NFR BLD: 8 % (ref 2–12)
NEUTROPHILS NFR BLD: 64 % (ref 43–80)
NEUTS SEG NFR BLD: 3.01 K/UL (ref 1.8–7.3)
PLATELET # BLD AUTO: 175 K/UL (ref 130–450)
PMV BLD AUTO: 11.5 FL (ref 7–12)
POTASSIUM SERPL-SCNC: 4.1 MMOL/L (ref 3.5–5)
PROT SERPL-MCNC: 6.8 G/DL (ref 6.4–8.3)
RBC # BLD AUTO: 4.94 M/UL (ref 3.5–5.5)
SODIUM SERPL-SCNC: 138 MMOL/L (ref 132–146)
TRIGL SERPL-MCNC: 69 MG/DL
VLDLC SERPL CALC-MCNC: 14 MG/DL
WBC OTHER # BLD: 4.7 K/UL (ref 4.5–11.5)

## 2023-08-19 PROCEDURE — 80061 LIPID PANEL: CPT

## 2023-08-19 PROCEDURE — 80053 COMPREHEN METABOLIC PANEL: CPT

## 2023-08-19 PROCEDURE — 36415 COLL VENOUS BLD VENIPUNCTURE: CPT

## 2023-08-19 PROCEDURE — 85025 COMPLETE CBC W/AUTO DIFF WBC: CPT

## 2023-08-21 ENCOUNTER — OFFICE VISIT (OUTPATIENT)
Dept: FAMILY MEDICINE CLINIC | Age: 63
End: 2023-08-21
Payer: COMMERCIAL

## 2023-08-21 VITALS
DIASTOLIC BLOOD PRESSURE: 86 MMHG | HEART RATE: 74 BPM | OXYGEN SATURATION: 98 % | WEIGHT: 213 LBS | SYSTOLIC BLOOD PRESSURE: 138 MMHG | TEMPERATURE: 97 F | BODY MASS INDEX: 32.39 KG/M2

## 2023-08-21 DIAGNOSIS — I10 HTN (HYPERTENSION), BENIGN: ICD-10-CM

## 2023-08-21 DIAGNOSIS — M79.10 MYALGIA: Primary | ICD-10-CM

## 2023-08-21 DIAGNOSIS — E87.6 HYPOKALEMIA: ICD-10-CM

## 2023-08-21 DIAGNOSIS — E78.2 MIXED HYPERLIPIDEMIA: ICD-10-CM

## 2023-08-21 PROCEDURE — 3079F DIAST BP 80-89 MM HG: CPT | Performed by: FAMILY MEDICINE

## 2023-08-21 PROCEDURE — 99214 OFFICE O/P EST MOD 30 MIN: CPT | Performed by: FAMILY MEDICINE

## 2023-08-21 PROCEDURE — 3075F SYST BP GE 130 - 139MM HG: CPT | Performed by: FAMILY MEDICINE

## 2023-08-21 RX ORDER — POTASSIUM CHLORIDE 750 MG/1
10 TABLET, FILM COATED, EXTENDED RELEASE ORAL 3 TIMES DAILY
Qty: 180 TABLET | Refills: 1 | Status: SHIPPED | OUTPATIENT
Start: 2023-08-21

## 2023-08-21 RX ORDER — SIMVASTATIN 10 MG
10 TABLET ORAL NIGHTLY
Qty: 90 TABLET | Refills: 1 | Status: SHIPPED | OUTPATIENT
Start: 2023-08-21

## 2023-08-21 RX ORDER — TRIAMTERENE AND HYDROCHLOROTHIAZIDE 37.5; 25 MG/1; MG/1
TABLET ORAL
Qty: 90 TABLET | Refills: 1 | Status: SHIPPED | OUTPATIENT
Start: 2023-08-21

## 2023-08-21 NOTE — PATIENT INSTRUCTIONS
Hold the Zocor  Continue low-sodium low-fat diet  Regular exercises  Lab work before the next visit  Return to clinic earlier if any problems

## 2023-08-21 NOTE — PROGRESS NOTES
OFFICE PROGRESS NOTE      SUBJECTIVE:        Patient ID:   Sujit Mercado is a 61 y.o. female who presents for   Chief Complaint   Patient presents with    Hypertension     Here for recheck on Hypertension and Hyperlipidemia. Continues to experience cramping in legs and hand. Lab work done,here for review. HPI:   Patient here for the follow-up  Complaining of cramps  Lab work is within normal limits  Patient has been taking her medication as prescribed  Blood pressure stable and normal  Blood pressure 138/86  Prior to Visit Medications    Medication Sig Taking? Authorizing Provider   triamterene-hydroCHLOROthiazide (MAXZIDE-25) 37.5-25 MG per tablet TAKE ONE TABLET BY MOUTH DAILY Yes Jamin Deleon MD   simvastatin (ZOCOR) 10 MG tablet Take 1 tablet by mouth nightly Yes Jamin Deleon MD   potassium chloride (KLOR-CON) 10 MEQ extended release tablet Take 1 tablet by mouth 3 times daily Yes Jamin Deleon MD   ibuprofen (IBU) 800 MG tablet Take 1 tablet by mouth every 8 hours as needed for Pain Yes Caroline Mullins MD   HYDROcodone-acetaminophen (NORCO)  MG per tablet Take 1 tablet by mouth daily as needed for Pain. Yes Historical Provider, MD   NONFORMULARY Dr. Caden Salinas doxycycline as needed for any dentist appts.   Instructed to take 2 morning of those procedures Yes Historical Provider, MD   Coenzyme Q10 (CO Q 10 PO) Take 200 mg by mouth Yes Historical Provider, MD   Cholecalciferol (VITAMIN D3) 125 MCG (5000 UT) TABS Take by mouth Yes Historical Provider, MD   methocarbamol (ROBAXIN) 750 MG tablet Take 1 tablet by mouth 2 times daily as needed (for pain (may cause drowsiness)) Yes Katelynn Serrano PA-C      Social History     Socioeconomic History    Marital status:      Spouse name: None    Number of children: None    Years of education: None    Highest education level: None   Tobacco Use    Smoking status: Former     Packs/day: 0.30     Years:

## 2023-09-30 ENCOUNTER — HOSPITAL ENCOUNTER (OUTPATIENT)
Age: 63
Discharge: HOME | End: 2023-09-30
Payer: COMMERCIAL

## 2023-09-30 DIAGNOSIS — E78.2 MIXED HYPERLIPIDEMIA: ICD-10-CM

## 2023-09-30 DIAGNOSIS — M79.10 MYALGIA: ICD-10-CM

## 2023-09-30 LAB
CHOLEST SERPL-MCNC: 186 MG/DL
CK SERPL-CCNC: 66 U/L (ref 20–180)
HDLC SERPL-MCNC: 51 MG/DL
LDLC SERPL CALC-MCNC: 117 MG/DL
TRIGL SERPL-MCNC: 90 MG/DL
VLDLC SERPL CALC-MCNC: 18 MG/DL

## 2023-09-30 PROCEDURE — 36415 COLL VENOUS BLD VENIPUNCTURE: CPT

## 2023-09-30 PROCEDURE — 82550 ASSAY OF CK (CPK): CPT

## 2023-09-30 PROCEDURE — 80061 LIPID PANEL: CPT

## 2023-10-02 ENCOUNTER — OFFICE VISIT (OUTPATIENT)
Dept: FAMILY MEDICINE CLINIC | Age: 63
End: 2023-10-02
Payer: COMMERCIAL

## 2023-10-02 VITALS
WEIGHT: 216 LBS | SYSTOLIC BLOOD PRESSURE: 116 MMHG | BODY MASS INDEX: 32.84 KG/M2 | DIASTOLIC BLOOD PRESSURE: 70 MMHG | OXYGEN SATURATION: 96 % | HEART RATE: 91 BPM | TEMPERATURE: 97 F

## 2023-10-02 DIAGNOSIS — E87.6 HYPOKALEMIA: ICD-10-CM

## 2023-10-02 DIAGNOSIS — G89.29 CHRONIC LOW BACK PAIN WITHOUT SCIATICA, UNSPECIFIED BACK PAIN LATERALITY: ICD-10-CM

## 2023-10-02 DIAGNOSIS — E78.2 MIXED HYPERLIPIDEMIA: ICD-10-CM

## 2023-10-02 DIAGNOSIS — M54.50 CHRONIC LOW BACK PAIN WITHOUT SCIATICA, UNSPECIFIED BACK PAIN LATERALITY: ICD-10-CM

## 2023-10-02 DIAGNOSIS — I10 HTN (HYPERTENSION), BENIGN: Primary | ICD-10-CM

## 2023-10-02 DIAGNOSIS — M79.10 MYALGIA: ICD-10-CM

## 2023-10-02 DIAGNOSIS — R73.9 HYPERGLYCEMIA: ICD-10-CM

## 2023-10-02 PROCEDURE — 99214 OFFICE O/P EST MOD 30 MIN: CPT | Performed by: FAMILY MEDICINE

## 2023-10-02 PROCEDURE — 3078F DIAST BP <80 MM HG: CPT | Performed by: FAMILY MEDICINE

## 2023-10-02 PROCEDURE — 3074F SYST BP LT 130 MM HG: CPT | Performed by: FAMILY MEDICINE

## 2023-10-02 ASSESSMENT — ENCOUNTER SYMPTOMS
ALLERGIC/IMMUNOLOGIC NEGATIVE: 1
RESPIRATORY NEGATIVE: 1
BACK PAIN: 1
GASTROINTESTINAL NEGATIVE: 1
EYES NEGATIVE: 1

## 2023-10-02 NOTE — PROGRESS NOTES
Diagnosis Date Noted    Soft tissue mass 01/09/2023    Back pain 03/05/2018    HTN (hypertension), benign 03/10/2017    Hyperlipemia 03/10/2017    Hypokalemia 03/10/2017        Diagnosis:   1. HTN (hypertension), benign  -     Comprehensive Metabolic Panel; Future  2. Mixed hyperlipidemia  -     Comprehensive Metabolic Panel; Future  -     Lipid Panel; Future  3. Hypokalemia  -     Comprehensive Metabolic Panel; Future  4. Myalgia  -     CBC with Auto Differential; Future  5. Hyperglycemia  -     Comprehensive Metabolic Panel; Future  6. Chronic low back pain without sciatica, unspecified back pain laterality  -     CBC with Auto Differential; Future       PLAN:   Continue present treatment  Low-sodium low-fat diet  Regular exercises  Lab work before the next visit  Return to clinic earlier if any problems  All the instructions given to the patient        Patient Instructions   Continue present treatment  Low-sodium low-fat low-carb diet  Regular exercises  Lab work before the next visit  Return to clinic earlier if any problems    Return in about 4 weeks (around 10/30/2023) for Medication Check, test results. Maki Andrade reviewed my findings and recommendations with Junito Toney.     Electronicallysigned by Alisia Salcedo MD on 10/2/23 at 4:31 PM EDT

## 2023-10-14 ENCOUNTER — HOSPITAL ENCOUNTER (OUTPATIENT)
Age: 63
Discharge: HOME | End: 2023-10-14
Payer: COMMERCIAL

## 2023-10-14 DIAGNOSIS — I10 HTN (HYPERTENSION), BENIGN: ICD-10-CM

## 2023-10-14 DIAGNOSIS — M54.50 CHRONIC LOW BACK PAIN WITHOUT SCIATICA, UNSPECIFIED BACK PAIN LATERALITY: ICD-10-CM

## 2023-10-14 DIAGNOSIS — R73.9 HYPERGLYCEMIA: ICD-10-CM

## 2023-10-14 DIAGNOSIS — M79.10 MYALGIA: ICD-10-CM

## 2023-10-14 DIAGNOSIS — E78.2 MIXED HYPERLIPIDEMIA: ICD-10-CM

## 2023-10-14 DIAGNOSIS — E87.6 HYPOKALEMIA: ICD-10-CM

## 2023-10-14 DIAGNOSIS — G89.29 CHRONIC LOW BACK PAIN WITHOUT SCIATICA, UNSPECIFIED BACK PAIN LATERALITY: ICD-10-CM

## 2023-10-14 LAB
ALBUMIN SERPL-MCNC: 4.2 G/DL (ref 3.5–5.2)
ALP SERPL-CCNC: 77 U/L (ref 35–104)
ALT SERPL-CCNC: 18 U/L (ref 0–32)
ANION GAP SERPL CALCULATED.3IONS-SCNC: 10 MMOL/L (ref 7–16)
AST SERPL-CCNC: 39 U/L (ref 0–31)
BASOPHILS # BLD: 0.04 K/UL (ref 0–0.2)
BASOPHILS NFR BLD: 1 % (ref 0–2)
BILIRUB SERPL-MCNC: 1 MG/DL (ref 0–1.2)
BUN SERPL-MCNC: 14 MG/DL (ref 6–23)
CALCIUM SERPL-MCNC: 9.5 MG/DL (ref 8.6–10.2)
CHLORIDE SERPL-SCNC: 106 MMOL/L (ref 98–107)
CHOLEST SERPL-MCNC: 188 MG/DL
CO2 SERPL-SCNC: 27 MMOL/L (ref 22–29)
CREAT SERPL-MCNC: 0.8 MG/DL (ref 0.5–1)
EOSINOPHIL # BLD: 0.18 K/UL (ref 0.05–0.5)
EOSINOPHILS RELATIVE PERCENT: 4 % (ref 0–6)
ERYTHROCYTE [DISTWIDTH] IN BLOOD BY AUTOMATED COUNT: 12.5 % (ref 12–16)
GFR SERPL CREATININE-BSD FRML MDRD: >60 ML/MIN/1.73M2
GLUCOSE SERPL-MCNC: 101 MG/DL (ref 74–99)
HCT VFR BLD AUTO: 42.2 % (ref 34–48)
HDLC SERPL-MCNC: 53 MG/DL
HGB BLD-MCNC: 14.2 G/DL (ref 11.5–15.5)
IMM GRANULOCYTES # BLD AUTO: <0.03 K/UL (ref 0–0.58)
IMM GRANULOCYTES NFR BLD: 0 % (ref 0–5)
LDLC SERPL CALC-MCNC: 115 MG/DL
LYMPHOCYTES NFR BLD: 1.16 K/UL (ref 1.5–4)
LYMPHOCYTES RELATIVE PERCENT: 25 % (ref 20–42)
MCH RBC QN AUTO: 28.4 PG (ref 26–35)
MCHC RBC AUTO-ENTMCNC: 33.6 G/DL (ref 32–34.5)
MCV RBC AUTO: 84.4 FL (ref 80–99.9)
MONOCYTES NFR BLD: 0.44 K/UL (ref 0.1–0.95)
MONOCYTES NFR BLD: 10 % (ref 2–12)
NEUTROPHILS NFR BLD: 60 % (ref 43–80)
NEUTS SEG NFR BLD: 2.74 K/UL (ref 1.8–7.3)
PLATELET # BLD AUTO: 184 K/UL (ref 130–450)
PMV BLD AUTO: 11.6 FL (ref 7–12)
POTASSIUM SERPL-SCNC: 3.8 MMOL/L (ref 3.5–5)
PROT SERPL-MCNC: 6.8 G/DL (ref 6.4–8.3)
RBC # BLD AUTO: 5 M/UL (ref 3.5–5.5)
SODIUM SERPL-SCNC: 143 MMOL/L (ref 132–146)
TRIGL SERPL-MCNC: 101 MG/DL
VLDLC SERPL CALC-MCNC: 20 MG/DL
WBC OTHER # BLD: 4.6 K/UL (ref 4.5–11.5)

## 2023-10-14 PROCEDURE — 80053 COMPREHEN METABOLIC PANEL: CPT

## 2023-10-14 PROCEDURE — 85025 COMPLETE CBC W/AUTO DIFF WBC: CPT

## 2023-10-14 PROCEDURE — 36415 COLL VENOUS BLD VENIPUNCTURE: CPT

## 2023-10-14 PROCEDURE — 80061 LIPID PANEL: CPT

## 2023-10-16 ENCOUNTER — OFFICE VISIT (OUTPATIENT)
Dept: FAMILY MEDICINE CLINIC | Age: 63
End: 2023-10-16
Payer: COMMERCIAL

## 2023-10-16 VITALS
SYSTOLIC BLOOD PRESSURE: 118 MMHG | HEART RATE: 73 BPM | OXYGEN SATURATION: 97 % | WEIGHT: 214 LBS | BODY MASS INDEX: 32.54 KG/M2 | TEMPERATURE: 97 F | DIASTOLIC BLOOD PRESSURE: 68 MMHG

## 2023-10-16 DIAGNOSIS — E87.6 HYPOKALEMIA: ICD-10-CM

## 2023-10-16 DIAGNOSIS — E78.2 MIXED HYPERLIPIDEMIA: ICD-10-CM

## 2023-10-16 DIAGNOSIS — R73.9 HYPERGLYCEMIA: ICD-10-CM

## 2023-10-16 DIAGNOSIS — Z01.818 PRE-OP EXAM: Primary | ICD-10-CM

## 2023-10-16 PROCEDURE — 93000 ELECTROCARDIOGRAM COMPLETE: CPT | Performed by: FAMILY MEDICINE

## 2023-10-16 PROCEDURE — 3078F DIAST BP <80 MM HG: CPT | Performed by: FAMILY MEDICINE

## 2023-10-16 PROCEDURE — 99214 OFFICE O/P EST MOD 30 MIN: CPT | Performed by: FAMILY MEDICINE

## 2023-10-16 PROCEDURE — 3074F SYST BP LT 130 MM HG: CPT | Performed by: FAMILY MEDICINE

## 2023-10-16 ASSESSMENT — ENCOUNTER SYMPTOMS
EYES NEGATIVE: 1
ALLERGIC/IMMUNOLOGIC NEGATIVE: 1
RESPIRATORY NEGATIVE: 1
GASTROINTESTINAL NEGATIVE: 1

## 2023-10-16 NOTE — PATIENT INSTRUCTIONS
Restart taking the statins  You have an average risk for the surgery  Low-sodium low-fat low-carb diet  Lab work in 2 months  Return to clinic after the lab work  Return to clinic earlier if any problems

## 2023-10-16 NOTE — PROGRESS NOTES
OFFICE PROGRESS NOTE      SUBJECTIVE:        Patient ID:   Live Ulloa is a 61 y.o. female who presents for   Chief Complaint   Patient presents with    Pre-op Exam     Here for pre-op exam for excision of Lt heel spur. HPI:   Patient here for the medical clearance for the heel surgery  Patient lab work is within normal limits    No other specific complaints  EKG nondiagnostic  Of low voltage  Patient denies any chest pain or shortness of breath  Has been taking all her medication as prescribed        Prior to Visit Medications    Medication Sig Taking? Authorizing Provider   triamterene-hydroCHLOROthiazide (MAXZIDE-25) 37.5-25 MG per tablet TAKE ONE TABLET BY MOUTH DAILY Yes Didier Valera MD   potassium chloride (KLOR-CON) 10 MEQ extended release tablet Take 1 tablet by mouth 3 times daily Yes Didier Valera MD   ibuprofen (IBU) 800 MG tablet Take 1 tablet by mouth every 8 hours as needed for Pain Yes Jeovanny Kiser MD   HYDROcodone-acetaminophen (NORCO)  MG per tablet Take 1 tablet by mouth daily as needed for Pain. Yes Maranda Fish MD   NONFORMULARY Dr. Anmol Gutierrez doxycycline as needed for any dentist appts.   Instructed to take 2 morning of those procedures Yes Maranda Fish MD   Coenzyme Q10 (CO Q 10 PO) Take 200 mg by mouth Yes Maranda Fish MD   Cholecalciferol (VITAMIN D3) 125 MCG (5000 UT) TABS Take by mouth Yes Maranda Fish MD   methocarbamol (ROBAXIN) 750 MG tablet Take 1 tablet by mouth 2 times daily as needed (for pain (may cause drowsiness)) Yes Bari Hernandez PA-C      Social History     Socioeconomic History    Marital status:      Spouse name: None    Number of children: None    Years of education: None    Highest education level: None   Tobacco Use    Smoking status: Former     Packs/day: 0.30     Years: 15.00     Additional pack years: 0.00     Total pack years: 4.50     Types:

## 2023-11-09 RX ORDER — SIMVASTATIN 10 MG
10 TABLET ORAL NIGHTLY
COMMUNITY

## 2023-11-09 RX ORDER — OXYCODONE HYDROCHLORIDE 5 MG/1
5 TABLET ORAL EVERY 4 HOURS PRN
COMMUNITY

## 2023-11-13 ENCOUNTER — ANESTHESIA EVENT (OUTPATIENT)
Dept: OPERATING ROOM | Age: 63
End: 2023-11-13
Payer: COMMERCIAL

## 2023-11-14 ENCOUNTER — HOSPITAL ENCOUNTER (OUTPATIENT)
Dept: OPERATING ROOM | Age: 63
Setting detail: OUTPATIENT SURGERY
Discharge: HOME | End: 2023-11-14
Attending: PODIATRIST
Payer: COMMERCIAL

## 2023-11-14 ENCOUNTER — HOSPITAL ENCOUNTER (OUTPATIENT)
Age: 63
Setting detail: OUTPATIENT SURGERY
Discharge: HOME | End: 2023-11-14
Attending: PODIATRIST | Admitting: PODIATRIST
Payer: COMMERCIAL

## 2023-11-14 ENCOUNTER — PREP FOR PROCEDURE (OUTPATIENT)
Dept: PODIATRY | Age: 63
End: 2023-11-14

## 2023-11-14 ENCOUNTER — ANESTHESIA (OUTPATIENT)
Dept: OPERATING ROOM | Age: 63
End: 2023-11-14
Payer: COMMERCIAL

## 2023-11-14 VITALS
HEART RATE: 62 BPM | DIASTOLIC BLOOD PRESSURE: 60 MMHG | TEMPERATURE: 96.9 F | OXYGEN SATURATION: 96 % | WEIGHT: 210.8 LBS | BODY MASS INDEX: 31.95 KG/M2 | HEIGHT: 68 IN | RESPIRATION RATE: 16 BRPM | SYSTOLIC BLOOD PRESSURE: 129 MMHG

## 2023-11-14 DIAGNOSIS — M77.31 HEEL SPUR, RIGHT: ICD-10-CM

## 2023-11-14 DIAGNOSIS — M72.2 PLANTAR FASCIAL FIBROMATOSIS: Primary | ICD-10-CM

## 2023-11-14 PROCEDURE — 2709999900 HC NON-CHARGEABLE SUPPLY: Performed by: PODIATRIST

## 2023-11-14 PROCEDURE — 3600000013 HC SURGERY LEVEL 3 ADDTL 15MIN: Performed by: PODIATRIST

## 2023-11-14 PROCEDURE — 3700000000 HC ANESTHESIA ATTENDED CARE: Performed by: PODIATRIST

## 2023-11-14 PROCEDURE — 7100000011 HC PHASE II RECOVERY - ADDTL 15 MIN: Performed by: PODIATRIST

## 2023-11-14 PROCEDURE — 2580000003 HC RX 258: Performed by: PODIATRIST

## 2023-11-14 PROCEDURE — 2580000003 HC RX 258: Performed by: STUDENT IN AN ORGANIZED HEALTH CARE EDUCATION/TRAINING PROGRAM

## 2023-11-14 PROCEDURE — 2500000003 HC RX 250 WO HCPCS: Performed by: PODIATRIST

## 2023-11-14 PROCEDURE — 6360000002 HC RX W HCPCS: Performed by: NURSE ANESTHETIST, CERTIFIED REGISTERED

## 2023-11-14 PROCEDURE — 7100000010 HC PHASE II RECOVERY - FIRST 15 MIN: Performed by: PODIATRIST

## 2023-11-14 PROCEDURE — 6370000000 HC RX 637 (ALT 250 FOR IP): Performed by: ANESTHESIOLOGY

## 2023-11-14 PROCEDURE — 6360000002 HC RX W HCPCS: Performed by: PODIATRIST

## 2023-11-14 PROCEDURE — 3600000003 HC SURGERY LEVEL 3 BASE: Performed by: PODIATRIST

## 2023-11-14 PROCEDURE — 3700000001 HC ADD 15 MINUTES (ANESTHESIA): Performed by: PODIATRIST

## 2023-11-14 PROCEDURE — 2500000003 HC RX 250 WO HCPCS: Performed by: NURSE ANESTHETIST, CERTIFIED REGISTERED

## 2023-11-14 RX ORDER — MIDAZOLAM HYDROCHLORIDE 1 MG/ML
INJECTION INTRAMUSCULAR; INTRAVENOUS PRN
Status: DISCONTINUED | OUTPATIENT
Start: 2023-11-14 | End: 2023-11-14 | Stop reason: SDUPTHER

## 2023-11-14 RX ORDER — DEXAMETHASONE SODIUM PHOSPHATE 10 MG/ML
INJECTION, SOLUTION INTRAMUSCULAR; INTRAVENOUS PRN
Status: DISCONTINUED | OUTPATIENT
Start: 2023-11-14 | End: 2023-11-14 | Stop reason: SDUPTHER

## 2023-11-14 RX ORDER — ONDANSETRON 2 MG/ML
INJECTION INTRAMUSCULAR; INTRAVENOUS PRN
Status: DISCONTINUED | OUTPATIENT
Start: 2023-11-14 | End: 2023-11-14 | Stop reason: SDUPTHER

## 2023-11-14 RX ORDER — SODIUM CHLORIDE 9 MG/ML
INJECTION, SOLUTION INTRAVENOUS PRN
Status: CANCELLED | OUTPATIENT
Start: 2023-11-14

## 2023-11-14 RX ORDER — SODIUM CHLORIDE, SODIUM LACTATE, POTASSIUM CHLORIDE, CALCIUM CHLORIDE 600; 310; 30; 20 MG/100ML; MG/100ML; MG/100ML; MG/100ML
INJECTION, SOLUTION INTRAVENOUS CONTINUOUS
Status: DISCONTINUED | OUTPATIENT
Start: 2023-11-14 | End: 2023-11-14 | Stop reason: HOSPADM

## 2023-11-14 RX ORDER — OXYCODONE HYDROCHLORIDE AND ACETAMINOPHEN 5; 325 MG/1; MG/1
1 TABLET ORAL EVERY 6 HOURS PRN
Qty: 12 TABLET | Refills: 0 | Status: SHIPPED | OUTPATIENT
Start: 2023-11-14 | End: 2023-11-17

## 2023-11-14 RX ORDER — SODIUM CHLORIDE 0.9 % (FLUSH) 0.9 %
5-40 SYRINGE (ML) INJECTION EVERY 12 HOURS SCHEDULED
Status: DISCONTINUED | OUTPATIENT
Start: 2023-11-14 | End: 2023-11-14 | Stop reason: HOSPADM

## 2023-11-14 RX ORDER — OXYCODONE HYDROCHLORIDE AND ACETAMINOPHEN 5; 325 MG/1; MG/1
1 TABLET ORAL ONCE
Status: COMPLETED | OUTPATIENT
Start: 2023-11-14 | End: 2023-11-14

## 2023-11-14 RX ORDER — OXYCODONE HYDROCHLORIDE AND ACETAMINOPHEN 5; 325 MG/1; MG/1
1 TABLET ORAL EVERY 6 HOURS PRN
Qty: 12 TABLET | Refills: 0 | OUTPATIENT
Start: 2023-11-14 | End: 2023-11-19

## 2023-11-14 RX ORDER — SODIUM CHLORIDE 0.9 % (FLUSH) 0.9 %
5-40 SYRINGE (ML) INJECTION EVERY 12 HOURS SCHEDULED
Status: CANCELLED | OUTPATIENT
Start: 2023-11-14

## 2023-11-14 RX ORDER — PROPOFOL 10 MG/ML
INJECTION, EMULSION INTRAVENOUS CONTINUOUS PRN
Status: DISCONTINUED | OUTPATIENT
Start: 2023-11-14 | End: 2023-11-14 | Stop reason: SDUPTHER

## 2023-11-14 RX ORDER — SODIUM CHLORIDE 0.9 % (FLUSH) 0.9 %
5-40 SYRINGE (ML) INJECTION PRN
Status: DISCONTINUED | OUTPATIENT
Start: 2023-11-14 | End: 2023-11-14 | Stop reason: HOSPADM

## 2023-11-14 RX ORDER — KETAMINE HCL IN NACL, ISO-OSM 100MG/10ML
SYRINGE (ML) INJECTION PRN
Status: DISCONTINUED | OUTPATIENT
Start: 2023-11-14 | End: 2023-11-14 | Stop reason: SDUPTHER

## 2023-11-14 RX ORDER — SODIUM CHLORIDE 9 MG/ML
INJECTION, SOLUTION INTRAVENOUS PRN
Status: DISCONTINUED | OUTPATIENT
Start: 2023-11-14 | End: 2023-11-14 | Stop reason: HOSPADM

## 2023-11-14 RX ORDER — DIPHENHYDRAMINE HYDROCHLORIDE 50 MG/ML
INJECTION INTRAMUSCULAR; INTRAVENOUS PRN
Status: DISCONTINUED | OUTPATIENT
Start: 2023-11-14 | End: 2023-11-14 | Stop reason: SDUPTHER

## 2023-11-14 RX ORDER — FENTANYL CITRATE 50 UG/ML
INJECTION, SOLUTION INTRAMUSCULAR; INTRAVENOUS PRN
Status: DISCONTINUED | OUTPATIENT
Start: 2023-11-14 | End: 2023-11-14 | Stop reason: SDUPTHER

## 2023-11-14 RX ORDER — SODIUM CHLORIDE 0.9 % (FLUSH) 0.9 %
5-40 SYRINGE (ML) INJECTION PRN
Status: CANCELLED | OUTPATIENT
Start: 2023-11-14

## 2023-11-14 RX ADMIN — CEFAZOLIN 2000 MG: 2 INJECTION, POWDER, FOR SOLUTION INTRAMUSCULAR; INTRAVENOUS at 08:19

## 2023-11-14 RX ADMIN — ONDANSETRON 4 MG: 2 INJECTION INTRAMUSCULAR; INTRAVENOUS at 08:21

## 2023-11-14 RX ADMIN — FENTANYL CITRATE 50 MCG: 50 INJECTION INTRAMUSCULAR; INTRAVENOUS at 08:23

## 2023-11-14 RX ADMIN — PROPOFOL 75 MCG/KG/MIN: 10 INJECTION, EMULSION INTRAVENOUS at 08:21

## 2023-11-14 RX ADMIN — DIPHENHYDRAMINE HYDROCHLORIDE 12.5 MG: 50 INJECTION, SOLUTION INTRAMUSCULAR; INTRAVENOUS at 08:23

## 2023-11-14 RX ADMIN — MIDAZOLAM 2 MG: 1 INJECTION INTRAMUSCULAR; INTRAVENOUS at 08:20

## 2023-11-14 RX ADMIN — Medication 20 MG: at 08:21

## 2023-11-14 RX ADMIN — OXYCODONE AND ACETAMINOPHEN 1 TABLET: 5; 325 TABLET ORAL at 09:22

## 2023-11-14 RX ADMIN — SODIUM CHLORIDE, POTASSIUM CHLORIDE, SODIUM LACTATE AND CALCIUM CHLORIDE: 600; 310; 30; 20 INJECTION, SOLUTION INTRAVENOUS at 07:33

## 2023-11-14 RX ADMIN — FENTANYL CITRATE 50 MCG: 50 INJECTION INTRAMUSCULAR; INTRAVENOUS at 08:21

## 2023-11-14 RX ADMIN — DEXAMETHASONE SODIUM PHOSPHATE 10 MG: 10 INJECTION, SOLUTION INTRAMUSCULAR; INTRAVENOUS at 08:23

## 2023-11-14 ASSESSMENT — PAIN - FUNCTIONAL ASSESSMENT: PAIN_FUNCTIONAL_ASSESSMENT: 0-10

## 2023-11-14 ASSESSMENT — PAIN DESCRIPTION - ORIENTATION
ORIENTATION: RIGHT
ORIENTATION: RIGHT

## 2023-11-14 ASSESSMENT — PAIN DESCRIPTION - PAIN TYPE
TYPE: SURGICAL PAIN
TYPE: SURGICAL PAIN

## 2023-11-14 ASSESSMENT — PAIN DESCRIPTION - DESCRIPTORS
DESCRIPTORS: ACHING
DESCRIPTORS: ACHING

## 2023-11-14 ASSESSMENT — PAIN SCALES - GENERAL
PAINLEVEL_OUTOF10: 0
PAINLEVEL_OUTOF10: 5
PAINLEVEL_OUTOF10: 0
PAINLEVEL_OUTOF10: 5

## 2023-11-14 ASSESSMENT — PAIN DESCRIPTION - LOCATION
LOCATION: FOOT
LOCATION: FOOT

## 2023-11-14 NOTE — ADDENDUM NOTE
Addendum  created 11/14/23 0916 by Tila Jacobson MD    Order list changed, Pharmacy for encounter modified

## 2023-11-14 NOTE — H&P
Review of Systems    Physical Exam   The pt. Was examined by me and there are no changes from previous h and p. The pt is cleared for surgery

## 2023-11-14 NOTE — DISCHARGE INSTRUCTIONS
[facility] Magdalena Peña DPM, [address]  [facility], [address] [place] [address]    [phone]  Fax [phone]      POST-OPERATIVE INSTRUCTIONS      Use medication ONLY as directed. Since some dizziness may occur, avoid walking without assistance. Eat first, wait 15 minutes, and take your medication. Eat all meals to prevent nausea. DO NOT CHANGE THE DRESSING AND DO NOT GET THE DRESSING WET. Do not be alarmed if some blood appears on the bandage. If blood soaks through, add dry sterile gauze to your bandage. Keep the foot elevated by placing a pillow under it. Elevate to heart level. Use ice on the inside of the ankle or behind the knee, 20 minutes on and 20 minutes off, for the first 48 hours. This will reduce pain and help control swelling. DO NOT use hot water bags or electric heating pads. Take your temperature at 4:00 PM(98.6* F is usually normal, but smoking, eating, and drinking will alter this for at least one-half hour). YOU now control your own recovery. The closer you follow the instructions, the easy your recovery will be. Keep all post-operative appointments with this office. They are instrumental to your recovery.   Please dispense  crutches ,surgical shoe, weight bearing as tolerated      Should you have any undue discomfort, swelling, or bleeding, the doctor may be reached at:  Magdalena Peña DPM, [address]  Cell phone: [phone]

## 2023-11-14 NOTE — BRIEF OP NOTE
Brief Postoperative Note      Patient: Gerson Hughes  YOB: 1960  MRN: [id number]    Date of Procedure: 11/14/2023    Pre-Op Diagnosis Codes:     * Calcaneal spur of right foot [M77.31]     * Plantar fascial fibromatosis [M72.2]    Post-Op Diagnosis: Same       Procedure(s):   HEEL SPUR RESECTION AND PLANTAR FASCIOTOMY RIGHT FOOT    Surgeon(s):  Tr Mishra DPM    Assistant:  Resident: Sherry Valdez DPM    Anesthesia: Monitor Anesthesia Care    Estimated Blood Loss (mL): Minimal    Complications: None    Specimens:   * No specimens in log *    Implants:  * No implants in log *      Drains: * No LDAs found *    Findings: Consistent with diagnosis       Electronically signed by Chris Monae DPM on 11/14/2023 at 8:44 AM

## 2023-11-14 NOTE — OP NOTE
Operative Note      Patient: Mariola Jeffers  YOB: 1960  MRN: [id number]    Date of Procedure: 11/14/2023    Pre-Op Diagnosis Codes:     * Calcaneal spur of right foot [M77.31]     * Plantar fascial fibromatosis [M72.2]    Post-Op Diagnosis: Same       Procedure(s): HEEL SPUR RESECTION AND PLANTAR FASCIOTOMY RIGHT FOOT    Surgeon(s):  Callum Guan DPM    Assistant:   Resident: Kaye Mcneil DPM    Anesthesia: Monitor Anesthesia Care    Estimated Blood Loss (mL): Minimal    Complications: None    Specimens:   * No specimens in log *    Implants:  * No implants in log *      Drains: * No LDAs found *    Findings: Consistent with diagnosis     Detailed Description of Procedure: The patient was brought into the operating room, properly identified and placed in the supine position on the OR table. Following the induction of local anesthesia and monitored anesthesia care, the right lower extremity was scrubbed,prepped and draped. An esmarch bandage was used to exsanguinate the right foot and lower leg and the pneumatic calf tourniquet was inflated. Attention was then directed towards the patient's right foot where preoperatively retrocalcaneal exostosis was noted. A needle was used to vanesa the exact area where the retrocalcaneal spur was located. C arm was used to confirm proper location. Once location was properly identified, a 15 blade was used to make a full thickness incision to the medial tubercle of the calcaneus. Following this,a  rasp was used to rasp posterior heel to a more smooth anatomic contour. Adequate resection of calcaneal spur was confirmed with intraoperative fluoroscopy. Next, the surgical site was flushed with copious amounts of normal sterile saline. The surgical site was closed with 3-0 nylon in a simple interrupted fashion. Dressing consisting of adaptic, gauze, and mario was applied to the right foot.  The patient tolerated the procedure and anesthesia well and was

## 2023-11-14 NOTE — ANESTHESIA POSTPROCEDURE EVALUATION
Department of Anesthesiology  Postprocedure Note    Patient: Sophy Rocha  MRN: [id number]  YOB: 1960  Date of evaluation: 11/14/2023      Procedure Summary     Date: 11/14/23 Room / Location: [address] [place] [address] / [facility]    Anesthesia Start: 7851 Anesthesia Stop: 0831    Procedure: HEEL SPUR RESECTION AND PLANTAR FASCIOTOMY RIGHT FOOT (Right) Diagnosis:       Calcaneal spur of right foot      Plantar fascial fibromatosis      (Calcaneal spur of right foot [M77.31])      (Plantar fascial fibromatosis [M72.2])    Surgeons: Eli Danielson DPM Responsible Provider: Didi Harrell MD    Anesthesia Type: MAC ASA Status: 2          Anesthesia Type: MAC    Helio Phase I: Helio Score: 10    Helio Phase II: Helio Score: 10      Anesthesia Post Evaluation    Patient location during evaluation: PACU  Patient participation: complete - patient participated  Level of consciousness: awake and alert  Airway patency: patent  Nausea & Vomiting: no nausea and no vomiting  Complications: no  Cardiovascular status: hemodynamically stable  Respiratory status: acceptable  Hydration status: euvolemic  Pain management: satisfactory to patient

## 2023-12-11 ENCOUNTER — HOSPITAL ENCOUNTER (OUTPATIENT)
Age: 63
Discharge: HOME | End: 2023-12-11
Payer: COMMERCIAL

## 2023-12-11 LAB
ALBUMIN SERPL-MCNC: 3.8 G/DL (ref 3.5–5.2)
ALP SERPL-CCNC: 74 U/L (ref 35–104)
ALT SERPL-CCNC: 18 U/L (ref 0–32)
ANION GAP SERPL CALCULATED.3IONS-SCNC: 8 MMOL/L (ref 7–16)
AST SERPL-CCNC: 35 U/L (ref 0–31)
BASOPHILS # BLD: 0.05 K/UL (ref 0–0.2)
BASOPHILS NFR BLD: 1 % (ref 0–2)
BILIRUB SERPL-MCNC: 0.9 MG/DL (ref 0–1.2)
BUN SERPL-MCNC: 14 MG/DL (ref 6–23)
CALCIUM SERPL-MCNC: 9.4 MG/DL (ref 8.6–10.2)
CHLORIDE SERPL-SCNC: 104 MMOL/L (ref 98–107)
CHOLEST SERPL-MCNC: 169 MG/DL
CO2 SERPL-SCNC: 28 MMOL/L (ref 22–29)
CREAT SERPL-MCNC: 0.8 MG/DL (ref 0.5–1)
EOSINOPHIL # BLD: 0.14 K/UL (ref 0.05–0.5)
EOSINOPHILS RELATIVE PERCENT: 3 % (ref 0–6)
ERYTHROCYTE [DISTWIDTH] IN BLOOD BY AUTOMATED COUNT: 12.4 % (ref 12–16)
GFR SERPL CREATININE-BSD FRML MDRD: >60 ML/MIN/1.73M2
GLUCOSE SERPL-MCNC: 99 MG/DL (ref 74–99)
HCT VFR BLD AUTO: 42.5 % (ref 34–48)
HDLC SERPL-MCNC: 58 MG/DL
HGB BLD-MCNC: 14.1 G/DL (ref 11.5–15.5)
IMM GRANULOCYTES # BLD AUTO: <0.03 K/UL (ref 0–0.58)
IMM GRANULOCYTES NFR BLD: 0 % (ref 0–5)
LDLC SERPL CALC-MCNC: 87 MG/DL
LYMPHOCYTES NFR BLD: 1.3 K/UL (ref 1.5–4)
LYMPHOCYTES RELATIVE PERCENT: 27 % (ref 20–42)
MCH RBC QN AUTO: 28 PG (ref 26–35)
MCHC RBC AUTO-ENTMCNC: 33.2 G/DL (ref 32–34.5)
MCV RBC AUTO: 84.5 FL (ref 80–99.9)
MONOCYTES NFR BLD: 0.37 K/UL (ref 0.1–0.95)
MONOCYTES NFR BLD: 8 % (ref 2–12)
NEUTROPHILS NFR BLD: 61 % (ref 43–80)
NEUTS SEG NFR BLD: 2.94 K/UL (ref 1.8–7.3)
PLATELET # BLD AUTO: 182 K/UL (ref 130–450)
PMV BLD AUTO: 12 FL (ref 7–12)
POTASSIUM SERPL-SCNC: 4 MMOL/L (ref 3.5–5)
PROT SERPL-MCNC: 6.4 G/DL (ref 6.4–8.3)
RBC # BLD AUTO: 5.03 M/UL (ref 3.5–5.5)
SODIUM SERPL-SCNC: 140 MMOL/L (ref 132–146)
TRIGL SERPL-MCNC: 118 MG/DL
VLDLC SERPL CALC-MCNC: 24 MG/DL
WBC OTHER # BLD: 4.8 K/UL (ref 4.5–11.5)

## 2023-12-11 PROCEDURE — 80061 LIPID PANEL: CPT

## 2023-12-11 PROCEDURE — 85025 COMPLETE CBC W/AUTO DIFF WBC: CPT

## 2023-12-11 PROCEDURE — 36415 COLL VENOUS BLD VENIPUNCTURE: CPT

## 2023-12-11 PROCEDURE — 80053 COMPREHEN METABOLIC PANEL: CPT

## 2023-12-24 ENCOUNTER — HOSPITAL ENCOUNTER (EMERGENCY)
Age: 63
Discharge: HOME | End: 2023-12-24
Payer: COMMERCIAL

## 2023-12-24 VITALS
HEART RATE: 101 BPM | DIASTOLIC BLOOD PRESSURE: 75 MMHG | WEIGHT: 200 LBS | BODY MASS INDEX: 30.31 KG/M2 | TEMPERATURE: 98.2 F | OXYGEN SATURATION: 96 % | SYSTOLIC BLOOD PRESSURE: 139 MMHG | RESPIRATION RATE: 18 BRPM | HEIGHT: 68 IN

## 2023-12-24 DIAGNOSIS — U07.1 COVID: Primary | ICD-10-CM

## 2023-12-24 LAB
INFLUENZA A BY PCR: NOT DETECTED
INFLUENZA B BY PCR: NOT DETECTED
SARS-COV-2 RDRP RESP QL NAA+PROBE: DETECTED
SPECIMEN DESCRIPTION: ABNORMAL

## 2023-12-24 PROCEDURE — 99211 OFF/OP EST MAY X REQ PHY/QHP: CPT

## 2023-12-24 PROCEDURE — 87635 SARS-COV-2 COVID-19 AMP PRB: CPT

## 2023-12-24 PROCEDURE — 87502 INFLUENZA DNA AMP PROBE: CPT

## 2023-12-24 RX ORDER — DEXAMETHASONE 6 MG/1
TABLET ORAL
Qty: 5 TABLET | Refills: 0 | Status: SHIPPED | OUTPATIENT
Start: 2023-12-24 | End: 2023-12-27

## 2023-12-24 NOTE — DISCHARGE INSTRUCTIONS
Self isolate 5 days 12/23/23 through 12/27/23  OTC cough and cold medications as directed  Tylenol for pain / fever

## 2023-12-27 ENCOUNTER — HOSPITAL ENCOUNTER (OUTPATIENT)
Age: 63
Discharge: HOME | End: 2023-12-29
Payer: COMMERCIAL

## 2023-12-27 ENCOUNTER — HOSPITAL ENCOUNTER (OUTPATIENT)
Dept: GENERAL RADIOLOGY | Age: 63
Discharge: HOME | End: 2023-12-29
Payer: COMMERCIAL

## 2023-12-27 ENCOUNTER — OFFICE VISIT (OUTPATIENT)
Dept: FAMILY MEDICINE CLINIC | Age: 63
End: 2023-12-27
Payer: COMMERCIAL

## 2023-12-27 VITALS
TEMPERATURE: 98 F | HEIGHT: 68 IN | DIASTOLIC BLOOD PRESSURE: 70 MMHG | SYSTOLIC BLOOD PRESSURE: 120 MMHG | HEART RATE: 70 BPM | BODY MASS INDEX: 32.58 KG/M2 | WEIGHT: 215 LBS | OXYGEN SATURATION: 97 % | RESPIRATION RATE: 16 BRPM

## 2023-12-27 DIAGNOSIS — I10 HTN (HYPERTENSION), BENIGN: ICD-10-CM

## 2023-12-27 DIAGNOSIS — R05.3 CHRONIC COUGH: ICD-10-CM

## 2023-12-27 DIAGNOSIS — R05.1 ACUTE COUGH: Primary | ICD-10-CM

## 2023-12-27 DIAGNOSIS — J06.9 VIRAL URI: ICD-10-CM

## 2023-12-27 DIAGNOSIS — E78.2 MIXED HYPERLIPIDEMIA: ICD-10-CM

## 2023-12-27 LAB
Lab: ABNORMAL
PERFORMING INSTRUMENT: ABNORMAL
QC PASS/FAIL: ABNORMAL
SARS-COV-2, POC: DETECTED

## 2023-12-27 PROCEDURE — 71046 X-RAY EXAM CHEST 2 VIEWS: CPT

## 2023-12-27 PROCEDURE — 3074F SYST BP LT 130 MM HG: CPT | Performed by: FAMILY MEDICINE

## 2023-12-27 PROCEDURE — 99214 OFFICE O/P EST MOD 30 MIN: CPT | Performed by: FAMILY MEDICINE

## 2023-12-27 PROCEDURE — 87426 SARSCOV CORONAVIRUS AG IA: CPT | Performed by: FAMILY MEDICINE

## 2023-12-27 PROCEDURE — 3078F DIAST BP <80 MM HG: CPT | Performed by: FAMILY MEDICINE

## 2023-12-27 RX ORDER — AZITHROMYCIN 250 MG/1
250 TABLET, FILM COATED ORAL SEE ADMIN INSTRUCTIONS
Qty: 6 TABLET | Refills: 0 | Status: SHIPPED | OUTPATIENT
Start: 2023-12-27 | End: 2024-01-01

## 2023-12-27 RX ORDER — DEXTROMETHORPHAN HYDROBROMIDE AND PROMETHAZINE HYDROCHLORIDE 15; 6.25 MG/5ML; MG/5ML
5 SYRUP ORAL 4 TIMES DAILY PRN
Qty: 240 ML | Refills: 0 | Status: SHIPPED | OUTPATIENT
Start: 2023-12-27 | End: 2024-01-03

## 2023-12-27 NOTE — PROGRESS NOTES
OFFICE PROGRESS NOTE      SUBJECTIVE:        Patient ID:   Arabella Mccall is a 61 y.o. female who presents for   Chief Complaint   Patient presents with    Cough     Patient has had a cough for week and was tested positive for covid 12/24/23. She did got to urgent care and was given steroids but she didn't take them    Pharyngitis    Otalgia     Patient states the left is worse than the right           HPI:   Patient states she has been coughing and having congestion  [facility]  Was given medication which she did not take  States that she is still feeling sick  Blood pressure stable and normal  120/70      Prior to Visit Medications    Medication Sig Taking? Authorizing Provider   omeprazole (PRILOSEC) 40 MG delayed release capsule Take 1 capsule by mouth every morning (before breakfast) Yes Alber Ravi MD   loratadine (CLARITIN) 10 MG tablet Take 1 tablet by mouth daily Yes Alber Ravi MD   simvastatin (ZOCOR) 10 MG tablet Take 1 tablet by mouth nightly Yes Maranda Fish MD   oxyCODONE (ROXICODONE) 5 MG immediate release tablet Take 1 tablet by mouth every 4 hours as needed for Pain. Yes Maranda Fish MD   triamterene-hydroCHLOROthiazide (MAXZIDE-25) 37.5-25 MG per tablet TAKE ONE TABLET BY MOUTH DAILY Yes Alber Ravi MD   potassium chloride (KLOR-CON) 10 MEQ extended release tablet Take 1 tablet by mouth 3 times daily Yes Alber Ravi MD   ibuprofen (IBU) 800 MG tablet Take 1 tablet by mouth every 8 hours as needed for Pain Yes Lilia Johnson MD   NONFORMULARY Dr. My Brennan doxycycline as needed for any dentist appts.   Instructed to take 2 morning of those procedures Yes Maranda Fish MD   Coenzyme Q10 (CO Q 10 PO) Take 400 mg by mouth Daily Yes Maranda Fish MD   Cholecalciferol (VITAMIN D3) 125 MCG (5000 UT) TABS Take by mouth daily Yes aMranda Fish MD   methocarbamol (ROBAXIN) 750 MG

## 2023-12-27 NOTE — PATIENT INSTRUCTIONS
Take the Z-Taz  Low-sodium low-fat low-carb diet  Force fluids  Hold the Zocor for 1 week  Return to clinic earlier if any problems

## 2024-01-09 DIAGNOSIS — E87.6 HYPOKALEMIA: ICD-10-CM

## 2024-01-10 RX ORDER — POTASSIUM CHLORIDE 750 MG/1
10 TABLET, FILM COATED, EXTENDED RELEASE ORAL 3 TIMES DAILY
Qty: 180 TABLET | Refills: 0 | Status: SHIPPED | OUTPATIENT
Start: 2024-01-10

## 2024-01-22 ENCOUNTER — OFFICE VISIT (OUTPATIENT)
Dept: FAMILY MEDICINE CLINIC | Age: 64
End: 2024-01-22
Payer: COMMERCIAL

## 2024-01-22 VITALS
WEIGHT: 216 LBS | HEART RATE: 74 BPM | OXYGEN SATURATION: 98 % | DIASTOLIC BLOOD PRESSURE: 78 MMHG | HEIGHT: 68 IN | RESPIRATION RATE: 16 BRPM | TEMPERATURE: 97 F | SYSTOLIC BLOOD PRESSURE: 126 MMHG | BODY MASS INDEX: 32.74 KG/M2

## 2024-01-22 DIAGNOSIS — R05.1 ACUTE COUGH: Primary | ICD-10-CM

## 2024-01-22 DIAGNOSIS — R05.3 CHRONIC COUGH: ICD-10-CM

## 2024-01-22 DIAGNOSIS — J06.9 VIRAL URI: ICD-10-CM

## 2024-01-22 DIAGNOSIS — E78.2 MIXED HYPERLIPIDEMIA: ICD-10-CM

## 2024-01-22 DIAGNOSIS — E87.6 HYPOKALEMIA: ICD-10-CM

## 2024-01-22 DIAGNOSIS — K21.9 GASTROESOPHAGEAL REFLUX DISEASE WITHOUT ESOPHAGITIS: ICD-10-CM

## 2024-01-22 PROCEDURE — 3078F DIAST BP <80 MM HG: CPT | Performed by: FAMILY MEDICINE

## 2024-01-22 PROCEDURE — 3074F SYST BP LT 130 MM HG: CPT | Performed by: FAMILY MEDICINE

## 2024-01-22 PROCEDURE — 99214 OFFICE O/P EST MOD 30 MIN: CPT | Performed by: FAMILY MEDICINE

## 2024-01-22 RX ORDER — BENZONATATE 100 MG/1
100 CAPSULE ORAL 3 TIMES DAILY PRN
Qty: 30 CAPSULE | Refills: 0 | Status: SHIPPED | OUTPATIENT
Start: 2024-01-22 | End: 2024-02-01

## 2024-01-22 ASSESSMENT — ENCOUNTER SYMPTOMS
ALLERGIC/IMMUNOLOGIC NEGATIVE: 1
SHORTNESS OF BREATH: 0
GASTROINTESTINAL NEGATIVE: 1
COUGH: 1
EYES NEGATIVE: 1

## 2024-01-22 ASSESSMENT — PATIENT HEALTH QUESTIONNAIRE - PHQ9
2. FEELING DOWN, DEPRESSED OR HOPELESS: 0
SUM OF ALL RESPONSES TO PHQ QUESTIONS 1-9: 0
SUM OF ALL RESPONSES TO PHQ QUESTIONS 1-9: 0
SUM OF ALL RESPONSES TO PHQ9 QUESTIONS 1 & 2: 0
SUM OF ALL RESPONSES TO PHQ QUESTIONS 1-9: 0
SUM OF ALL RESPONSES TO PHQ QUESTIONS 1-9: 0
1. LITTLE INTEREST OR PLEASURE IN DOING THINGS: 0

## 2024-01-22 NOTE — PATIENT INSTRUCTIONS
Take the omeprazole for the stomach  Take the cough Perles as needed  Low-sodium low-fat diet  Return to clinic earlier if any problems  Sleep with the head of your bed up

## 2024-01-22 NOTE — PROGRESS NOTES
OFFICE PROGRESS NOTE      SUBJECTIVE:        Patient ID:   Dayanna Palacio is a 63 y.o. female who presents for   Chief Complaint   Patient presents with    Cough     Patient states her cough is about the same. She did have chest xray done           HPI:   Still complaining of cough  Blood pressure 126/78  X-ray chest is negative  Patient not taking her current medication  No shortness of breath no fever        Prior to Visit Medications    Medication Sig Taking? Authorizing Provider   potassium chloride (KLOR-CON) 10 MEQ extended release tablet TAKE ONE TABLET BY MOUTH THREE TIMES A DAY Yes Shan Marcial MD   omeprazole (PRILOSEC) 40 MG delayed release capsule Take 1 capsule by mouth every morning (before breakfast) Yes Shan Marcial MD   loratadine (CLARITIN) 10 MG tablet Take 1 tablet by mouth daily Yes Shan Marcial MD   simvastatin (ZOCOR) 10 MG tablet Take 1 tablet by mouth nightly Yes Maranda Fish MD   oxyCODONE (ROXICODONE) 5 MG immediate release tablet Take 1 tablet by mouth every 4 hours as needed for Pain. Yes Maranda Fish MD   triamterene-hydroCHLOROthiazide (MAXZIDE-25) 37.5-25 MG per tablet TAKE ONE TABLET BY MOUTH DAILY Yes Shan Marcial MD   ibuprofen (IBU) 800 MG tablet Take 1 tablet by mouth every 8 hours as needed for Pain Yes Scott Duran MD   NONFORMULARY Dr. Funes doxycycline as needed for any dentist appts.  Instructed to take 2 morning of those procedures Yes Maranda Fish MD   Coenzyme Q10 (CO Q 10 PO) Take 400 mg by mouth Daily Yes Maranda Fish MD   Cholecalciferol (VITAMIN D3) 125 MCG (5000 UT) TABS Take by mouth daily Yes Maranda Fish MD   methocarbamol (ROBAXIN) 750 MG tablet Take 1 tablet by mouth 2 times daily as needed (for pain (may cause drowsiness)) Yes Bari Hernandez PA-C      Social History     Socioeconomic History    Marital status:      Spouse name: None

## 2024-02-19 ENCOUNTER — OFFICE VISIT (OUTPATIENT)
Dept: FAMILY MEDICINE CLINIC | Age: 64
End: 2024-02-19
Payer: COMMERCIAL

## 2024-02-19 VITALS
BODY MASS INDEX: 34.06 KG/M2 | HEART RATE: 88 BPM | OXYGEN SATURATION: 97 % | SYSTOLIC BLOOD PRESSURE: 136 MMHG | DIASTOLIC BLOOD PRESSURE: 86 MMHG | WEIGHT: 224 LBS | TEMPERATURE: 97 F

## 2024-02-19 DIAGNOSIS — K21.9 GASTROESOPHAGEAL REFLUX DISEASE WITHOUT ESOPHAGITIS: Primary | ICD-10-CM

## 2024-02-19 DIAGNOSIS — E78.2 MIXED HYPERLIPIDEMIA: ICD-10-CM

## 2024-02-19 DIAGNOSIS — E87.6 HYPOKALEMIA: ICD-10-CM

## 2024-02-19 DIAGNOSIS — R73.9 HYPERGLYCEMIA: ICD-10-CM

## 2024-02-19 DIAGNOSIS — I10 HTN (HYPERTENSION), BENIGN: ICD-10-CM

## 2024-02-19 PROCEDURE — 99214 OFFICE O/P EST MOD 30 MIN: CPT | Performed by: FAMILY MEDICINE

## 2024-02-19 PROCEDURE — 3079F DIAST BP 80-89 MM HG: CPT | Performed by: FAMILY MEDICINE

## 2024-02-19 PROCEDURE — 3075F SYST BP GE 130 - 139MM HG: CPT | Performed by: FAMILY MEDICINE

## 2024-02-19 RX ORDER — POTASSIUM CHLORIDE 750 MG/1
10 TABLET, FILM COATED, EXTENDED RELEASE ORAL 3 TIMES DAILY
Qty: 180 TABLET | Refills: 1 | Status: SHIPPED | OUTPATIENT
Start: 2024-02-19

## 2024-02-19 ASSESSMENT — ENCOUNTER SYMPTOMS
GASTROINTESTINAL NEGATIVE: 1
RESPIRATORY NEGATIVE: 1
EYES NEGATIVE: 1
ALLERGIC/IMMUNOLOGIC NEGATIVE: 1

## 2024-02-19 NOTE — PROGRESS NOTES
OFFICE PROGRESS NOTE      SUBJECTIVE:        Patient ID:   Dayanna Palacio is a 63 y.o. female who presents for   Chief Complaint   Patient presents with    Follow-up     For the blood pressure           HPI:   Patient states she is feeling okay  .  Taking medication as prescribed  Blood pressure stable with the medication  Blood pressure 136/86      Prior to Visit Medications    Medication Sig Taking? Authorizing Provider   potassium chloride (KLOR-CON) 10 MEQ extended release tablet Take 1 tablet by mouth 3 times daily Yes Shan Marcial MD   omeprazole (PRILOSEC) 40 MG delayed release capsule Take 1 capsule by mouth every morning (before breakfast) Yes Shan Marcial MD   loratadine (CLARITIN) 10 MG tablet Take 1 tablet by mouth daily Yes Shan Marcial MD   simvastatin (ZOCOR) 10 MG tablet Take 1 tablet by mouth nightly Yes Maranda Fish MD   oxyCODONE (ROXICODONE) 5 MG immediate release tablet Take 1 tablet by mouth every 4 hours as needed for Pain. Yes Maranda Fish MD   triamterene-hydroCHLOROthiazide (MAXZIDE-25) 37.5-25 MG per tablet TAKE ONE TABLET BY MOUTH DAILY Yes Shan Marcial MD   ibuprofen (IBU) 800 MG tablet Take 1 tablet by mouth every 8 hours as needed for Pain Yes Scott Duran MD   NONFORMULARY Dr. Funes doxycycline as needed for any dentist appts.  Instructed to take 2 morning of those procedures Yes Maranda Fish MD   Coenzyme Q10 (CO Q 10 PO) Take 400 mg by mouth Daily Yes Maranda Fish MD   Cholecalciferol (VITAMIN D3) 125 MCG (5000 UT) TABS Take by mouth daily Yes Maranda Fish MD   methocarbamol (ROBAXIN) 750 MG tablet Take 1 tablet by mouth 2 times daily as needed (for pain (may cause drowsiness)) Yes Bari Hernandez PA-C      Social History     Socioeconomic History    Marital status:      Spouse name: None    Number of children: None    Years of education: None    Highest education

## 2024-02-19 NOTE — PATIENT INSTRUCTIONS
Continue present treatment  Low-sodium low-fat low-carb diet  Regular exercises  Lab work before the next visit  Return to clinic earlier if any problems

## 2024-03-06 NOTE — Clinical Note
Juan F Parmar was seen and treated in our emergency department on 1/23/2022. She may return to work on 01/25/2022. If you have any questions or concerns, please don't hesitate to call.       Álvaro Edmonds PA-C
The patient was asked if she would like a chaperone present for her intimate exam. She  Declined the chaperone. Salvador Zarate CMA (AAMA)    
appropriate ).    Colonoscopy and bone density screening discussed ( age appropriate ).    Birth control and STD prevention discussed ( age appropriate ).    Gardisil counseling completed for all patients ( age appropriate ).    Routine health maintenance ( per PCP and guidelines ).

## 2024-03-25 RX ORDER — SIMVASTATIN 10 MG
10 TABLET ORAL NIGHTLY
Qty: 90 TABLET | Refills: 0 | Status: SHIPPED | OUTPATIENT
Start: 2024-03-25

## 2024-05-21 ENCOUNTER — HOSPITAL ENCOUNTER (OUTPATIENT)
Age: 64
Discharge: HOME | End: 2024-05-21
Payer: COMMERCIAL

## 2024-05-21 DIAGNOSIS — E78.2 MIXED HYPERLIPIDEMIA: ICD-10-CM

## 2024-05-21 DIAGNOSIS — I10 HTN (HYPERTENSION), BENIGN: ICD-10-CM

## 2024-05-21 DIAGNOSIS — K21.9 GASTROESOPHAGEAL REFLUX DISEASE WITHOUT ESOPHAGITIS: ICD-10-CM

## 2024-05-21 DIAGNOSIS — R73.9 HYPERGLYCEMIA: ICD-10-CM

## 2024-05-21 DIAGNOSIS — E87.6 HYPOKALEMIA: ICD-10-CM

## 2024-05-21 LAB
ERYTHROCYTE [DISTWIDTH] IN BLOOD BY AUTOMATED COUNT: 12 % (ref 12–16)
HCT VFR BLD AUTO: 42.1 % (ref 34–48)
HGB BLD-MCNC: 14.9 G/DL (ref 11.5–15.5)
MCH RBC QN AUTO: 29.8 PG (ref 26–35)
MCHC RBC AUTO-ENTMCNC: 35.4 G/DL (ref 32–34.5)
MCV RBC AUTO: 84.2 FL (ref 80–99.9)
PLATELET # BLD AUTO: 163 K/UL (ref 130–450)
PMV BLD AUTO: 11.4 FL (ref 7–12)
RBC # BLD AUTO: 5 M/UL (ref 3.5–5.5)
WBC OTHER # BLD: 4.1 K/UL (ref 4.5–11.5)

## 2024-05-21 PROCEDURE — 36415 COLL VENOUS BLD VENIPUNCTURE: CPT

## 2024-05-21 PROCEDURE — 80061 LIPID PANEL: CPT

## 2024-05-21 PROCEDURE — 85027 COMPLETE CBC AUTOMATED: CPT

## 2024-05-21 PROCEDURE — 80053 COMPREHEN METABOLIC PANEL: CPT

## 2024-05-22 ENCOUNTER — OFFICE VISIT (OUTPATIENT)
Dept: FAMILY MEDICINE CLINIC | Age: 64
End: 2024-05-22
Payer: COMMERCIAL

## 2024-05-22 VITALS
SYSTOLIC BLOOD PRESSURE: 130 MMHG | BODY MASS INDEX: 32.99 KG/M2 | WEIGHT: 217 LBS | OXYGEN SATURATION: 97 % | HEART RATE: 82 BPM | TEMPERATURE: 97.5 F | DIASTOLIC BLOOD PRESSURE: 70 MMHG

## 2024-05-22 DIAGNOSIS — E78.2 MIXED HYPERLIPIDEMIA: Primary | ICD-10-CM

## 2024-05-22 DIAGNOSIS — E87.6 HYPOKALEMIA: ICD-10-CM

## 2024-05-22 DIAGNOSIS — K21.9 GASTROESOPHAGEAL REFLUX DISEASE WITHOUT ESOPHAGITIS: ICD-10-CM

## 2024-05-22 DIAGNOSIS — I10 HTN (HYPERTENSION), BENIGN: ICD-10-CM

## 2024-05-22 LAB
ALBUMIN SERPL-MCNC: 4.2 G/DL (ref 3.5–5.2)
ALP SERPL-CCNC: 78 U/L (ref 35–104)
ALT SERPL-CCNC: 22 U/L (ref 0–32)
ANION GAP SERPL CALCULATED.3IONS-SCNC: 13 MMOL/L (ref 7–16)
AST SERPL-CCNC: 40 U/L (ref 0–31)
BILIRUB SERPL-MCNC: 1.1 MG/DL (ref 0–1.2)
BUN SERPL-MCNC: 12 MG/DL (ref 6–23)
CALCIUM SERPL-MCNC: 9.7 MG/DL (ref 8.6–10.2)
CHLORIDE SERPL-SCNC: 104 MMOL/L (ref 98–107)
CHOLEST SERPL-MCNC: 160 MG/DL
CO2 SERPL-SCNC: 25 MMOL/L (ref 22–29)
CREAT SERPL-MCNC: 0.8 MG/DL (ref 0.5–1)
GFR, ESTIMATED: 85 ML/MIN/1.73M2
GLUCOSE SERPL-MCNC: 100 MG/DL (ref 74–99)
HDLC SERPL-MCNC: 57 MG/DL
LDLC SERPL CALC-MCNC: 82 MG/DL
POTASSIUM SERPL-SCNC: 4 MMOL/L (ref 3.5–5)
PROT SERPL-MCNC: 6.9 G/DL (ref 6.4–8.3)
SODIUM SERPL-SCNC: 142 MMOL/L (ref 132–146)
TRIGL SERPL-MCNC: 104 MG/DL
VLDLC SERPL CALC-MCNC: 21 MG/DL

## 2024-05-22 PROCEDURE — 99214 OFFICE O/P EST MOD 30 MIN: CPT | Performed by: FAMILY MEDICINE

## 2024-05-22 PROCEDURE — 3075F SYST BP GE 130 - 139MM HG: CPT | Performed by: FAMILY MEDICINE

## 2024-05-22 PROCEDURE — 3078F DIAST BP <80 MM HG: CPT | Performed by: FAMILY MEDICINE

## 2024-05-22 RX ORDER — POTASSIUM CHLORIDE 750 MG/1
10 TABLET, FILM COATED, EXTENDED RELEASE ORAL 3 TIMES DAILY
Qty: 180 TABLET | Refills: 1 | Status: SHIPPED | OUTPATIENT
Start: 2024-05-22

## 2024-05-22 RX ORDER — AZITHROMYCIN 250 MG/1
250 TABLET, FILM COATED ORAL DAILY
COMMUNITY
Start: 2024-05-20

## 2024-05-22 RX ORDER — BENZONATATE 200 MG/1
200 CAPSULE ORAL 3 TIMES DAILY PRN
Qty: 30 CAPSULE | Refills: 0 | Status: SHIPPED | OUTPATIENT
Start: 2024-05-22 | End: 2024-05-29

## 2024-05-22 RX ORDER — SIMVASTATIN 10 MG
10 TABLET ORAL NIGHTLY
Qty: 90 TABLET | Refills: 1 | Status: SHIPPED | OUTPATIENT
Start: 2024-05-22

## 2024-05-22 RX ORDER — TRIAMTERENE AND HYDROCHLOROTHIAZIDE 37.5; 25 MG/1; MG/1
TABLET ORAL
Qty: 90 TABLET | Refills: 1 | Status: SHIPPED | OUTPATIENT
Start: 2024-05-22

## 2024-05-22 SDOH — ECONOMIC STABILITY: FOOD INSECURITY: WITHIN THE PAST 12 MONTHS, THE FOOD YOU BOUGHT JUST DIDN'T LAST AND YOU DIDN'T HAVE MONEY TO GET MORE.: NEVER TRUE

## 2024-05-22 SDOH — ECONOMIC STABILITY: INCOME INSECURITY: HOW HARD IS IT FOR YOU TO PAY FOR THE VERY BASICS LIKE FOOD, HOUSING, MEDICAL CARE, AND HEATING?: NOT VERY HARD

## 2024-05-22 SDOH — ECONOMIC STABILITY: FOOD INSECURITY: WITHIN THE PAST 12 MONTHS, YOU WORRIED THAT YOUR FOOD WOULD RUN OUT BEFORE YOU GOT MONEY TO BUY MORE.: NEVER TRUE

## 2024-05-22 ASSESSMENT — ENCOUNTER SYMPTOMS
ALLERGIC/IMMUNOLOGIC NEGATIVE: 1
EYES NEGATIVE: 1
RESPIRATORY NEGATIVE: 1
BACK PAIN: 1
GASTROINTESTINAL NEGATIVE: 1

## 2024-05-22 ASSESSMENT — PATIENT HEALTH QUESTIONNAIRE - PHQ9
SUM OF ALL RESPONSES TO PHQ QUESTIONS 1-9: 0
SUM OF ALL RESPONSES TO PHQ QUESTIONS 1-9: 0
1. LITTLE INTEREST OR PLEASURE IN DOING THINGS: NOT AT ALL
SUM OF ALL RESPONSES TO PHQ QUESTIONS 1-9: 0
SUM OF ALL RESPONSES TO PHQ QUESTIONS 1-9: 0
SUM OF ALL RESPONSES TO PHQ9 QUESTIONS 1 & 2: 0
2. FEELING DOWN, DEPRESSED OR HOPELESS: NOT AT ALL

## 2024-05-22 NOTE — PATIENT INSTRUCTIONS
Continue present treatment  Low-sodium low-fat low-carb weight reduction diet  Regular exercises  Lab work before the next visit  Return to clinic earlier if any problems

## 2024-05-22 NOTE — PROGRESS NOTES
10:05 AM     No results found for: \"PSA\", \"CEA\", \"\", \"DA3506\", \"\"      Controlled Substances Monitoring:                                    ASSESSMENT     Patient Active Problem List    Diagnosis Date Noted    Soft tissue mass 01/09/2023    Back pain 03/05/2018    HTN (hypertension), benign 03/10/2017    Hyperlipemia 03/10/2017    Hypokalemia 03/10/2017        Diagnosis:   1. Mixed hyperlipidemia  -     simvastatin (ZOCOR) 10 MG tablet; Take 1 tablet by mouth nightly, Disp-90 tablet, R-1Normal  2. Hypokalemia  -     potassium chloride (KLOR-CON) 10 MEQ extended release tablet; Take 1 tablet by mouth 3 times daily, Disp-180 tablet, R-1Normal  3. HTN (hypertension), benign  -     triamterene-hydroCHLOROthiazide (MAXZIDE-25) 37.5-25 MG per tablet; TAKE ONE TABLET BY MOUTH DAILY, Disp-90 tablet, R-1Normal  4. Gastroesophageal reflux disease without esophagitis       PLAN:   Continue present treatment  Low-sodium low-fat low-carb weight reduction diet  Regular exercises  Tesssanol cough pearls  Lab work before the next visit  Return to clinic earlier if any problems  All instructions given to the patient      Patient Instructions   Continue present treatment  Low-sodium low-fat low-carb weight reduction diet  Regular exercises  Lab work before the next visit  Return to clinic earlier if any problems    Return in about 3 months (around 8/22/2024) for Medication Check, test results.         Dionna reviewed my findings and recommendations with Dayanna Palacio.    Electronicallysigned by Shna Marcial MD on 5/22/24 at 3:48 PM EDT

## 2024-07-01 ENCOUNTER — OFFICE VISIT (OUTPATIENT)
Dept: FAMILY MEDICINE CLINIC | Age: 64
End: 2024-07-01
Payer: COMMERCIAL

## 2024-07-01 VITALS
BODY MASS INDEX: 32.99 KG/M2 | SYSTOLIC BLOOD PRESSURE: 130 MMHG | OXYGEN SATURATION: 98 % | WEIGHT: 217 LBS | DIASTOLIC BLOOD PRESSURE: 80 MMHG | HEART RATE: 61 BPM | TEMPERATURE: 97.4 F

## 2024-07-01 DIAGNOSIS — E78.2 MIXED HYPERLIPIDEMIA: ICD-10-CM

## 2024-07-01 DIAGNOSIS — J06.9 VIRAL URI: Primary | ICD-10-CM

## 2024-07-01 PROCEDURE — 3079F DIAST BP 80-89 MM HG: CPT | Performed by: FAMILY MEDICINE

## 2024-07-01 PROCEDURE — 99214 OFFICE O/P EST MOD 30 MIN: CPT | Performed by: FAMILY MEDICINE

## 2024-07-01 PROCEDURE — 3075F SYST BP GE 130 - 139MM HG: CPT | Performed by: FAMILY MEDICINE

## 2024-07-01 RX ORDER — AZITHROMYCIN 250 MG/1
250 TABLET, FILM COATED ORAL DAILY
Qty: 1 PACKET | Refills: 0 | Status: SHIPPED | OUTPATIENT
Start: 2024-07-01

## 2024-07-01 RX ORDER — DEXTROMETHORPHAN HYDROBROMIDE AND PROMETHAZINE HYDROCHLORIDE 15; 6.25 MG/5ML; MG/5ML
5 SYRUP ORAL 4 TIMES DAILY PRN
Qty: 240 ML | Refills: 0 | Status: SHIPPED | OUTPATIENT
Start: 2024-07-01 | End: 2024-07-08

## 2024-07-01 ASSESSMENT — ENCOUNTER SYMPTOMS
COUGH: 1
ALLERGIC/IMMUNOLOGIC NEGATIVE: 1
GASTROINTESTINAL NEGATIVE: 1
EYES NEGATIVE: 1

## 2024-07-01 NOTE — PATIENT INSTRUCTIONS
Take the Z-Taz  Phenergan DM 1 teaspoonful 4 times a day  Hold the cholesterol medicine for 1 week  Continue the medication  Force fluids  Return to clinic earlier if any problems

## 2024-07-01 NOTE — PROGRESS NOTES
OFFICE PROGRESS NOTE      SUBJECTIVE:        Patient ID:   Dayanna Palacio is a 64 y.o. female who presents for   Chief Complaint   Patient presents with    Cough     C/o productive cough with upper chest congestion x 1 week.Denies fever or chills.Reports covid test negative.           HPI:   Patient here complaining of cough  No shortness of breath  Blood pressure stable and normal  130/80      Prior to Visit Medications    Medication Sig Taking? Authorizing Provider   azithromycin (ZITHROMAX) 250 MG tablet Take 1 tablet by mouth daily Yes Shan Marcial MD   promethazine-dextromethorphan (PROMETHAZINE-DM) 6.25-15 MG/5ML syrup Take 5 mLs by mouth 4 times daily as needed for Cough Yes Shan Marcial MD   potassium chloride (KLOR-CON) 10 MEQ extended release tablet Take 1 tablet by mouth 3 times daily Yes Shan Marcial MD   triamterene-hydroCHLOROthiazide (MAXZIDE-25) 37.5-25 MG per tablet TAKE ONE TABLET BY MOUTH DAILY Yes Shan Marcial MD   simvastatin (ZOCOR) 10 MG tablet Take 1 tablet by mouth nightly Yes Shan Marcial MD   omeprazole (PRILOSEC) 40 MG delayed release capsule Take 1 capsule by mouth every morning (before breakfast) Yes Shan Mracial MD   loratadine (CLARITIN) 10 MG tablet Take 1 tablet by mouth daily Yes Shan Marcial MD   oxyCODONE (ROXICODONE) 5 MG immediate release tablet Take 1 tablet by mouth every 4 hours as needed for Pain. Yes Maranda Fish MD   ibuprofen (IBU) 800 MG tablet Take 1 tablet by mouth every 8 hours as needed for Pain Yes Scott Duran MD   NONFORMULARY Dr. Funes doxycycline as needed for any dentist appts.  Instructed to take 2 morning of those procedures Yes Maranda Fish MD   Coenzyme Q10 (CO Q 10 PO) Take 400 mg by mouth Daily Yes Maranda Fish MD   Cholecalciferol (VITAMIN D3) 125 MCG (5000 UT) TABS Take by mouth daily Yes Maranda Fish MD   methocarbamol (ROBAXIN) 750

## 2024-07-15 ENCOUNTER — OFFICE VISIT (OUTPATIENT)
Dept: FAMILY MEDICINE CLINIC | Age: 64
End: 2024-07-15
Payer: COMMERCIAL

## 2024-07-15 VITALS
WEIGHT: 219 LBS | SYSTOLIC BLOOD PRESSURE: 130 MMHG | OXYGEN SATURATION: 96 % | BODY MASS INDEX: 33.3 KG/M2 | TEMPERATURE: 97.1 F | HEART RATE: 82 BPM | DIASTOLIC BLOOD PRESSURE: 72 MMHG

## 2024-07-15 DIAGNOSIS — I10 HTN (HYPERTENSION), BENIGN: ICD-10-CM

## 2024-07-15 DIAGNOSIS — J06.9 VIRAL URI: Primary | ICD-10-CM

## 2024-07-15 DIAGNOSIS — E78.2 MIXED HYPERLIPIDEMIA: ICD-10-CM

## 2024-07-15 PROCEDURE — 3078F DIAST BP <80 MM HG: CPT | Performed by: FAMILY MEDICINE

## 2024-07-15 PROCEDURE — 3075F SYST BP GE 130 - 139MM HG: CPT | Performed by: FAMILY MEDICINE

## 2024-07-15 PROCEDURE — 99212 OFFICE O/P EST SF 10 MIN: CPT | Performed by: FAMILY MEDICINE

## 2024-07-15 ASSESSMENT — ENCOUNTER SYMPTOMS
ALLERGIC/IMMUNOLOGIC NEGATIVE: 1
RESPIRATORY NEGATIVE: 1
EYES NEGATIVE: 1
GASTROINTESTINAL NEGATIVE: 1

## 2024-07-15 NOTE — PATIENT INSTRUCTIONS
Continue present treatment  Low-sodium low-fat low-carb diet  Follow with orthopedic surgeon  Return to clinic at regular appointment  Return to clinic earlier if any problems

## 2024-07-15 NOTE — PROGRESS NOTES
OFFICE PROGRESS NOTE      SUBJECTIVE:        Patient ID:   Dayanna Palacio is a 64 y.o. female who presents for   Chief Complaint   Patient presents with    URI     Here for recheck on URI.Continues to experience a slight dry cough.           HPI:   Patient here for the follow-up  URI symptoms are improving  Blood pressure 130/72          Prior to Visit Medications    Medication Sig Taking? Authorizing Provider   potassium chloride (KLOR-CON) 10 MEQ extended release tablet Take 1 tablet by mouth 3 times daily Yes Shan Marcial MD   triamterene-hydroCHLOROthiazide (MAXZIDE-25) 37.5-25 MG per tablet TAKE ONE TABLET BY MOUTH DAILY Yes Shan Marcial MD   simvastatin (ZOCOR) 10 MG tablet Take 1 tablet by mouth nightly Yes Shan Marcial MD   omeprazole (PRILOSEC) 40 MG delayed release capsule Take 1 capsule by mouth every morning (before breakfast) Yes Shan Marcial MD   loratadine (CLARITIN) 10 MG tablet Take 1 tablet by mouth daily Yes Shan Marcial MD   oxyCODONE (ROXICODONE) 5 MG immediate release tablet Take 1 tablet by mouth every 4 hours as needed for Pain. Yes Maranda Fish MD   ibuprofen (IBU) 800 MG tablet Take 1 tablet by mouth every 8 hours as needed for Pain Yes Scott Duran MD   NONFORMULARY Dr. Funes doxycycline as needed for any dentist appts.  Instructed to take 2 morning of those procedures Yes Maranda Fish MD   Coenzyme Q10 (CO Q 10 PO) Take 400 mg by mouth Daily Yes Maranda Fish MD   Cholecalciferol (VITAMIN D3) 125 MCG (5000 UT) TABS Take by mouth daily Yes Maranda Fish MD   methocarbamol (ROBAXIN) 750 MG tablet Take 1 tablet by mouth 2 times daily as needed (for pain (may cause drowsiness)) Yes Bari Hernandez PA-C      Social History     Socioeconomic History    Marital status:      Spouse name: None    Number of children: None    Years of education: None    Highest education level: None

## 2024-08-23 ENCOUNTER — HOSPITAL ENCOUNTER (OUTPATIENT)
Age: 64
Discharge: HOME | End: 2024-08-23
Payer: COMMERCIAL

## 2024-08-23 LAB
BASOPHILS # BLD: 0.05 K/UL (ref 0–0.2)
BASOPHILS NFR BLD: 1 % (ref 0–2)
CRP SERPL HS-MCNC: <3 MG/L (ref 0–5)
EOSINOPHIL # BLD: 0.16 K/UL (ref 0.05–0.5)
EOSINOPHILS RELATIVE PERCENT: 4 % (ref 0–6)
ERYTHROCYTE [DISTWIDTH] IN BLOOD BY AUTOMATED COUNT: 12.6 % (ref 12–16)
ERYTHROCYTE [SEDIMENTATION RATE] IN BLOOD BY WESTERGREN METHOD: 9 MM/HR (ref 0–20)
HCT VFR BLD AUTO: 39.1 % (ref 34–48)
HGB BLD-MCNC: 13.9 G/DL (ref 11.5–15.5)
IMM GRANULOCYTES # BLD AUTO: <0.03 K/UL (ref 0–0.58)
IMM GRANULOCYTES NFR BLD: 0 % (ref 0–5)
LYMPHOCYTES NFR BLD: 1.13 K/UL (ref 1.5–4)
LYMPHOCYTES RELATIVE PERCENT: 25 % (ref 20–42)
MCH RBC QN AUTO: 30 PG (ref 26–35)
MCHC RBC AUTO-ENTMCNC: 35.5 G/DL (ref 32–34.5)
MCV RBC AUTO: 84.3 FL (ref 80–99.9)
MONOCYTES NFR BLD: 0.32 K/UL (ref 0.1–0.95)
MONOCYTES NFR BLD: 7 % (ref 2–12)
NEUTROPHILS NFR BLD: 63 % (ref 43–80)
NEUTS SEG NFR BLD: 2.88 K/UL (ref 1.8–7.3)
PLATELET # BLD AUTO: 147 K/UL (ref 130–450)
PMV BLD AUTO: 11.7 FL (ref 7–12)
RBC # BLD AUTO: 4.64 M/UL (ref 3.5–5.5)
WBC OTHER # BLD: 4.6 K/UL (ref 4.5–11.5)

## 2024-08-23 PROCEDURE — 36415 COLL VENOUS BLD VENIPUNCTURE: CPT

## 2024-08-23 PROCEDURE — 85025 COMPLETE CBC W/AUTO DIFF WBC: CPT

## 2024-08-23 PROCEDURE — 85652 RBC SED RATE AUTOMATED: CPT

## 2024-08-23 PROCEDURE — 86140 C-REACTIVE PROTEIN: CPT

## 2024-08-26 ENCOUNTER — OFFICE VISIT (OUTPATIENT)
Dept: FAMILY MEDICINE CLINIC | Age: 64
End: 2024-08-26
Payer: COMMERCIAL

## 2024-08-26 VITALS
DIASTOLIC BLOOD PRESSURE: 84 MMHG | HEART RATE: 67 BPM | BODY MASS INDEX: 33.6 KG/M2 | TEMPERATURE: 97.3 F | OXYGEN SATURATION: 98 % | SYSTOLIC BLOOD PRESSURE: 136 MMHG | WEIGHT: 221 LBS

## 2024-08-26 DIAGNOSIS — I10 HTN (HYPERTENSION), BENIGN: ICD-10-CM

## 2024-08-26 DIAGNOSIS — K21.9 GASTROESOPHAGEAL REFLUX DISEASE WITHOUT ESOPHAGITIS: ICD-10-CM

## 2024-08-26 DIAGNOSIS — E87.6 HYPOKALEMIA: ICD-10-CM

## 2024-08-26 DIAGNOSIS — E78.2 MIXED HYPERLIPIDEMIA: ICD-10-CM

## 2024-08-26 DIAGNOSIS — R73.9 HYPERGLYCEMIA: ICD-10-CM

## 2024-08-26 DIAGNOSIS — R05.3 CHRONIC COUGH: Primary | ICD-10-CM

## 2024-08-26 PROCEDURE — 99214 OFFICE O/P EST MOD 30 MIN: CPT | Performed by: FAMILY MEDICINE

## 2024-08-26 PROCEDURE — 3079F DIAST BP 80-89 MM HG: CPT | Performed by: FAMILY MEDICINE

## 2024-08-26 PROCEDURE — 3075F SYST BP GE 130 - 139MM HG: CPT | Performed by: FAMILY MEDICINE

## 2024-08-26 RX ORDER — BENZONATATE 100 MG/1
100 CAPSULE ORAL 3 TIMES DAILY PRN
Qty: 30 CAPSULE | Refills: 0 | Status: SHIPPED | OUTPATIENT
Start: 2024-08-26 | End: 2024-09-05

## 2024-08-26 ASSESSMENT — ENCOUNTER SYMPTOMS
ALLERGIC/IMMUNOLOGIC NEGATIVE: 1
EYES NEGATIVE: 1
COUGH: 1
GASTROINTESTINAL NEGATIVE: 1

## 2024-08-26 NOTE — PROGRESS NOTES
OFFICE PROGRESS NOTE      SUBJECTIVE:        Patient ID:   Dayanna Palacio is a 64 y.o. female who presents for   Chief Complaint   Patient presents with    Hypertension     Here for recheck on Hypertension and Hyperlipidemia. C/o intermittent cough requesting tessalon perls.           HPI:   Patient here for the follow-up  Blood pressure 136/84 stable with the medication  Complaining of slight cough  No shortness of breath        Prior to Visit Medications    Medication Sig Taking? Authorizing Provider   benzonatate (TESSALON) 100 MG capsule Take 1 capsule by mouth 3 times daily as needed for Cough Yes Shan Marcial MD   potassium chloride (KLOR-CON) 10 MEQ extended release tablet Take 1 tablet by mouth 3 times daily Yes Shan Marcial MD   triamterene-hydroCHLOROthiazide (MAXZIDE-25) 37.5-25 MG per tablet TAKE ONE TABLET BY MOUTH DAILY Yes Shan Marcial MD   simvastatin (ZOCOR) 10 MG tablet Take 1 tablet by mouth nightly Yes Shan Marcial MD   omeprazole (PRILOSEC) 40 MG delayed release capsule Take 1 capsule by mouth every morning (before breakfast) Yes Shan Marcial MD   loratadine (CLARITIN) 10 MG tablet Take 1 tablet by mouth daily Yes Shan Marcial MD   ibuprofen (IBU) 800 MG tablet Take 1 tablet by mouth every 8 hours as needed for Pain Yes Scott Duran MD   NONFORMULARY Dr. Funes doxycycline as needed for any dentist appts.  Instructed to take 2 morning of those procedures Yes Maranda Fish MD   Coenzyme Q10 (CO Q 10 PO) Take 400 mg by mouth Daily Yes Maranda Fish MD   Cholecalciferol (VITAMIN D3) 125 MCG (5000 UT) TABS Take by mouth daily Yes Maranda Fish MD   methocarbamol (ROBAXIN) 750 MG tablet Take 1 tablet by mouth 2 times daily as needed (for pain (may cause drowsiness)) Yes Bari Hernandez PA-C   oxyCODONE (ROXICODONE) 5 MG immediate release tablet Take 1 tablet by mouth every 4 hours as needed for

## 2024-08-26 NOTE — PATIENT INSTRUCTIONS
Continue present treatment  Low-sodium low-fat low-carb diet  Follow-up with orthopedic surgeon  Lab work with his next visit  Return to clinic earlier if any problems

## 2024-09-19 ENCOUNTER — HOSPITAL ENCOUNTER (OUTPATIENT)
Age: 64
Discharge: HOME | End: 2024-09-19
Payer: COMMERCIAL

## 2024-09-19 DIAGNOSIS — R73.9 HYPERGLYCEMIA: ICD-10-CM

## 2024-09-19 DIAGNOSIS — R05.3 CHRONIC COUGH: ICD-10-CM

## 2024-09-19 DIAGNOSIS — E87.6 HYPOKALEMIA: ICD-10-CM

## 2024-09-19 DIAGNOSIS — I10 HTN (HYPERTENSION), BENIGN: ICD-10-CM

## 2024-09-19 DIAGNOSIS — E78.2 MIXED HYPERLIPIDEMIA: ICD-10-CM

## 2024-09-19 DIAGNOSIS — K21.9 GASTROESOPHAGEAL REFLUX DISEASE WITHOUT ESOPHAGITIS: ICD-10-CM

## 2024-09-19 LAB
ALBUMIN SERPL-MCNC: 4 G/DL (ref 3.5–5.2)
ALP SERPL-CCNC: 72 U/L (ref 35–104)
ALT SERPL-CCNC: 17 U/L (ref 0–32)
ANION GAP SERPL CALCULATED.3IONS-SCNC: 12 MMOL/L (ref 7–16)
AST SERPL-CCNC: 27 U/L (ref 0–31)
BASOPHILS # BLD: 0.03 K/UL (ref 0–0.2)
BASOPHILS NFR BLD: 1 % (ref 0–2)
BILIRUB SERPL-MCNC: 0.9 MG/DL (ref 0–1.2)
BUN SERPL-MCNC: 29 MG/DL (ref 6–23)
CALCIUM SERPL-MCNC: 9.4 MG/DL (ref 8.6–10.2)
CHLORIDE SERPL-SCNC: 106 MMOL/L (ref 98–107)
CHOLEST SERPL-MCNC: 190 MG/DL
CO2 SERPL-SCNC: 25 MMOL/L (ref 22–29)
CREAT SERPL-MCNC: 0.9 MG/DL (ref 0.5–1)
EOSINOPHIL # BLD: 0.14 K/UL (ref 0.05–0.5)
EOSINOPHILS RELATIVE PERCENT: 4 % (ref 0–6)
ERYTHROCYTE [DISTWIDTH] IN BLOOD BY AUTOMATED COUNT: 12.5 % (ref 12–16)
GFR, ESTIMATED: 69 ML/MIN/1.73M2
GLUCOSE SERPL-MCNC: 95 MG/DL (ref 74–99)
HCT VFR BLD AUTO: 39.8 % (ref 34–48)
HDLC SERPL-MCNC: 65 MG/DL
HGB BLD-MCNC: 14.2 G/DL (ref 11.5–15.5)
IMM GRANULOCYTES # BLD AUTO: <0.03 K/UL (ref 0–0.58)
IMM GRANULOCYTES NFR BLD: 0 % (ref 0–5)
LDLC SERPL CALC-MCNC: 113 MG/DL
LYMPHOCYTES NFR BLD: 1.06 K/UL (ref 1.5–4)
LYMPHOCYTES RELATIVE PERCENT: 27 % (ref 20–42)
MCH RBC QN AUTO: 30.5 PG (ref 26–35)
MCHC RBC AUTO-ENTMCNC: 35.7 G/DL (ref 32–34.5)
MCV RBC AUTO: 85.4 FL (ref 80–99.9)
MONOCYTES NFR BLD: 0.3 K/UL (ref 0.1–0.95)
MONOCYTES NFR BLD: 8 % (ref 2–12)
NEUTROPHILS NFR BLD: 61 % (ref 43–80)
NEUTS SEG NFR BLD: 2.42 K/UL (ref 1.8–7.3)
PLATELET # BLD AUTO: 151 K/UL (ref 130–450)
PMV BLD AUTO: 11.8 FL (ref 7–12)
POTASSIUM SERPL-SCNC: 4 MMOL/L (ref 3.5–5)
PROT SERPL-MCNC: 6.7 G/DL (ref 6.4–8.3)
RBC # BLD AUTO: 4.66 M/UL (ref 3.5–5.5)
SODIUM SERPL-SCNC: 143 MMOL/L (ref 132–146)
TRIGL SERPL-MCNC: 62 MG/DL
VLDLC SERPL CALC-MCNC: 12 MG/DL
WBC OTHER # BLD: 4 K/UL (ref 4.5–11.5)

## 2024-09-19 PROCEDURE — 36415 COLL VENOUS BLD VENIPUNCTURE: CPT

## 2024-09-19 PROCEDURE — 80061 LIPID PANEL: CPT

## 2024-09-19 PROCEDURE — 85025 COMPLETE CBC W/AUTO DIFF WBC: CPT

## 2024-09-19 PROCEDURE — 80053 COMPREHEN METABOLIC PANEL: CPT

## 2024-09-23 ENCOUNTER — OFFICE VISIT (OUTPATIENT)
Dept: FAMILY MEDICINE CLINIC | Age: 64
End: 2024-09-23
Payer: COMMERCIAL

## 2024-09-23 VITALS
HEART RATE: 71 BPM | BODY MASS INDEX: 34.06 KG/M2 | SYSTOLIC BLOOD PRESSURE: 120 MMHG | WEIGHT: 224 LBS | OXYGEN SATURATION: 97 % | DIASTOLIC BLOOD PRESSURE: 80 MMHG | TEMPERATURE: 97.7 F

## 2024-09-23 DIAGNOSIS — E78.2 MIXED HYPERLIPIDEMIA: Primary | ICD-10-CM

## 2024-09-23 DIAGNOSIS — E87.6 HYPOKALEMIA: ICD-10-CM

## 2024-09-23 DIAGNOSIS — I10 HTN (HYPERTENSION), BENIGN: ICD-10-CM

## 2024-09-23 PROCEDURE — 99214 OFFICE O/P EST MOD 30 MIN: CPT | Performed by: FAMILY MEDICINE

## 2024-09-23 PROCEDURE — 3074F SYST BP LT 130 MM HG: CPT | Performed by: FAMILY MEDICINE

## 2024-09-23 PROCEDURE — 3079F DIAST BP 80-89 MM HG: CPT | Performed by: FAMILY MEDICINE

## 2024-09-23 ASSESSMENT — ENCOUNTER SYMPTOMS
RESPIRATORY NEGATIVE: 1
ALLERGIC/IMMUNOLOGIC NEGATIVE: 1
BACK PAIN: 1
GASTROINTESTINAL NEGATIVE: 1
EYES NEGATIVE: 1

## 2024-09-24 DIAGNOSIS — E87.6 HYPOKALEMIA: ICD-10-CM

## 2024-09-25 RX ORDER — POTASSIUM CHLORIDE 750 MG/1
10 TABLET, EXTENDED RELEASE ORAL 3 TIMES DAILY
Qty: 180 TABLET | Refills: 0 | Status: SHIPPED | OUTPATIENT
Start: 2024-09-25

## 2024-12-01 DIAGNOSIS — E87.6 HYPOKALEMIA: ICD-10-CM

## 2024-12-02 RX ORDER — POTASSIUM CHLORIDE 750 MG/1
10 TABLET, EXTENDED RELEASE ORAL 3 TIMES DAILY
Qty: 180 TABLET | Refills: 0 | Status: SHIPPED | OUTPATIENT
Start: 2024-12-02

## 2024-12-16 ENCOUNTER — TELEPHONE (OUTPATIENT)
Dept: FAMILY MEDICINE CLINIC | Age: 64
End: 2024-12-16

## 2024-12-16 RX ORDER — OMEPRAZOLE 40 MG/1
40 CAPSULE, DELAYED RELEASE ORAL DAILY
Qty: 30 CAPSULE | Refills: 1 | Status: SHIPPED
Start: 2024-12-16 | End: 2024-12-16

## 2024-12-16 RX ORDER — OMEPRAZOLE 40 MG/1
40 CAPSULE, DELAYED RELEASE ORAL DAILY
COMMUNITY
End: 2024-12-16 | Stop reason: SDUPTHER

## 2024-12-16 RX ORDER — OMEPRAZOLE 40 MG/1
40 CAPSULE, DELAYED RELEASE ORAL DAILY
Qty: 30 CAPSULE | Refills: 1 | Status: SHIPPED | OUTPATIENT
Start: 2024-12-16

## 2024-12-16 NOTE — TELEPHONE ENCOUNTER
Patient called and states is having increased heartburn. She is asking if something can be sent in for her. She hasn't been taking omeprazole for a long time. I did remove that from her med list

## 2024-12-27 ENCOUNTER — HOSPITAL ENCOUNTER (OUTPATIENT)
Age: 64
Discharge: HOME | End: 2024-12-27
Payer: COMMERCIAL

## 2024-12-27 DIAGNOSIS — E78.2 MIXED HYPERLIPIDEMIA: ICD-10-CM

## 2024-12-27 DIAGNOSIS — E87.6 HYPOKALEMIA: ICD-10-CM

## 2024-12-27 DIAGNOSIS — I10 HTN (HYPERTENSION), BENIGN: ICD-10-CM

## 2024-12-27 LAB
ALBUMIN SERPL-MCNC: 3.9 G/DL (ref 3.5–5.2)
ALP SERPL-CCNC: 88 U/L (ref 35–104)
ALT SERPL-CCNC: 17 U/L (ref 0–32)
ANION GAP SERPL CALCULATED.3IONS-SCNC: 9 MMOL/L (ref 7–16)
AST SERPL-CCNC: 37 U/L (ref 0–31)
BASOPHILS # BLD: 0.04 K/UL (ref 0–0.2)
BASOPHILS NFR BLD: 1 % (ref 0–2)
BILIRUB SERPL-MCNC: 0.7 MG/DL (ref 0–1.2)
BUN SERPL-MCNC: 16 MG/DL (ref 6–23)
CALCIUM SERPL-MCNC: 10 MG/DL (ref 8.6–10.2)
CHLORIDE SERPL-SCNC: 102 MMOL/L (ref 98–107)
CHOLEST SERPL-MCNC: 172 MG/DL
CO2 SERPL-SCNC: 28 MMOL/L (ref 22–29)
CREAT SERPL-MCNC: 0.8 MG/DL (ref 0.5–1)
EOSINOPHIL # BLD: 0.18 K/UL (ref 0.05–0.5)
EOSINOPHILS RELATIVE PERCENT: 4 % (ref 0–6)
ERYTHROCYTE [DISTWIDTH] IN BLOOD BY AUTOMATED COUNT: 12.2 % (ref 12–16)
GFR, ESTIMATED: 79 ML/MIN/1.73M2
GLUCOSE SERPL-MCNC: 91 MG/DL (ref 74–99)
HCT VFR BLD AUTO: 37.8 % (ref 34–48)
HDLC SERPL-MCNC: 58 MG/DL
HGB BLD-MCNC: 12.7 G/DL (ref 11.5–15.5)
IMM GRANULOCYTES # BLD AUTO: <0.03 K/UL (ref 0–0.58)
IMM GRANULOCYTES NFR BLD: 0 % (ref 0–5)
LDLC SERPL CALC-MCNC: 93 MG/DL
LYMPHOCYTES NFR BLD: 1.27 K/UL (ref 1.5–4)
LYMPHOCYTES RELATIVE PERCENT: 24 % (ref 20–42)
MCH RBC QN AUTO: 28.9 PG (ref 26–35)
MCHC RBC AUTO-ENTMCNC: 33.6 G/DL (ref 32–34.5)
MCV RBC AUTO: 85.9 FL (ref 80–99.9)
MONOCYTES NFR BLD: 0.38 K/UL (ref 0.1–0.95)
MONOCYTES NFR BLD: 7 % (ref 2–12)
NEUTROPHILS NFR BLD: 64 % (ref 43–80)
NEUTS SEG NFR BLD: 3.33 K/UL (ref 1.8–7.3)
PLATELET # BLD AUTO: 165 K/UL (ref 130–450)
PMV BLD AUTO: 11.9 FL (ref 7–12)
POTASSIUM SERPL-SCNC: 3.7 MMOL/L (ref 3.5–5)
PROT SERPL-MCNC: 6.6 G/DL (ref 6.4–8.3)
RBC # BLD AUTO: 4.4 M/UL (ref 3.5–5.5)
SODIUM SERPL-SCNC: 139 MMOL/L (ref 132–146)
TRIGL SERPL-MCNC: 103 MG/DL
VLDLC SERPL CALC-MCNC: 21 MG/DL
WBC OTHER # BLD: 5.2 K/UL (ref 4.5–11.5)

## 2024-12-27 PROCEDURE — 36415 COLL VENOUS BLD VENIPUNCTURE: CPT

## 2024-12-27 PROCEDURE — 85025 COMPLETE CBC W/AUTO DIFF WBC: CPT

## 2024-12-27 PROCEDURE — 80053 COMPREHEN METABOLIC PANEL: CPT

## 2024-12-27 PROCEDURE — 80061 LIPID PANEL: CPT

## 2024-12-30 ENCOUNTER — OFFICE VISIT (OUTPATIENT)
Dept: FAMILY MEDICINE CLINIC | Age: 64
End: 2024-12-30
Payer: COMMERCIAL

## 2024-12-30 VITALS
OXYGEN SATURATION: 99 % | BODY MASS INDEX: 34.52 KG/M2 | SYSTOLIC BLOOD PRESSURE: 114 MMHG | WEIGHT: 227 LBS | HEART RATE: 75 BPM | DIASTOLIC BLOOD PRESSURE: 80 MMHG

## 2024-12-30 DIAGNOSIS — E78.2 MIXED HYPERLIPIDEMIA: Primary | ICD-10-CM

## 2024-12-30 DIAGNOSIS — E87.6 HYPOKALEMIA: ICD-10-CM

## 2024-12-30 DIAGNOSIS — I10 HTN (HYPERTENSION), BENIGN: ICD-10-CM

## 2024-12-30 PROCEDURE — 99214 OFFICE O/P EST MOD 30 MIN: CPT | Performed by: FAMILY MEDICINE

## 2024-12-30 PROCEDURE — 3074F SYST BP LT 130 MM HG: CPT | Performed by: FAMILY MEDICINE

## 2024-12-30 PROCEDURE — 3079F DIAST BP 80-89 MM HG: CPT | Performed by: FAMILY MEDICINE

## 2024-12-30 RX ORDER — POTASSIUM CHLORIDE 750 MG/1
10 TABLET, EXTENDED RELEASE ORAL 3 TIMES DAILY
Qty: 270 TABLET | Refills: 1 | Status: SHIPPED | OUTPATIENT
Start: 2024-12-30

## 2024-12-30 RX ORDER — OXYCODONE HYDROCHLORIDE 5 MG/1
TABLET ORAL
COMMUNITY
Start: 2024-12-03

## 2024-12-30 RX ORDER — TRIAMTERENE AND HYDROCHLOROTHIAZIDE 37.5; 25 MG/1; MG/1
TABLET ORAL
Qty: 90 TABLET | Refills: 1 | Status: SHIPPED | OUTPATIENT
Start: 2024-12-30

## 2024-12-30 RX ORDER — DOXYCYCLINE HYCLATE 100 MG
100 TABLET ORAL 2 TIMES DAILY
COMMUNITY

## 2024-12-30 ASSESSMENT — ENCOUNTER SYMPTOMS
EYES NEGATIVE: 1
ALLERGIC/IMMUNOLOGIC NEGATIVE: 1
GASTROINTESTINAL NEGATIVE: 1
RESPIRATORY NEGATIVE: 1

## 2024-12-30 NOTE — PATIENT INSTRUCTIONS
Continue present treatment  Low-sodium low-fat diet  Follow-up with the consultant  Lab work before the next visit  Return to clinic earlier if any problems

## 2024-12-30 NOTE — PROGRESS NOTES
allergies, medications, problem list, medical, social and family history and have updated as needed in the electronic medical record”  Review Of Systems:    Review of Systems   Constitutional: Negative.    HENT: Negative.     Eyes: Negative.    Respiratory: Negative.     Cardiovascular: Negative.    Gastrointestinal: Negative.    Endocrine: Negative.    Musculoskeletal:  Positive for arthralgias.   Skin: Negative.    Allergic/Immunologic: Negative.    Neurological: Negative.    Hematological: Negative.    Psychiatric/Behavioral: Negative.                OBJECTIVE:     VS:  Wt Readings from Last 3 Encounters:   12/30/24 103 kg (227 lb)   09/23/24 101.6 kg (224 lb)   08/26/24 100.2 kg (221 lb)     Temp Readings from Last 3 Encounters:   09/23/24 97.7 °F (36.5 °C) (Infrared)   08/26/24 97.3 °F (36.3 °C) (Infrared)   07/15/24 97.1 °F (36.2 °C) (Infrared)     BP Readings from Last 3 Encounters:   12/30/24 114/80   09/23/24 120/80   08/26/24 136/84        Physical Exam  Constitutional:       Appearance: She is well-developed.   HENT:      Head: Normocephalic and atraumatic.   Eyes:      Conjunctiva/sclera: Conjunctivae normal.      Pupils: Pupils are equal, round, and reactive to light.   Cardiovascular:      Rate and Rhythm: Normal rate and regular rhythm.      Heart sounds: Normal heart sounds.   Pulmonary:      Effort: Pulmonary effort is normal.      Breath sounds: Normal breath sounds.   Abdominal:      General: Bowel sounds are normal.      Palpations: Abdomen is soft.   Musculoskeletal:         General: Normal range of motion.      Cervical back: Normal range of motion and neck supple.   Skin:     General: Skin is warm and dry.   Neurological:      Mental Status: She is alert and oriented to person, place, and time.   Psychiatric:         Thought Content: Thought content normal.            Labs :    Lab Results   Component Value Date    WBC 5.2 12/27/2024    HGB 12.7 12/27/2024    HCT 37.8 12/27/2024

## 2025-01-13 DIAGNOSIS — E78.2 MIXED HYPERLIPIDEMIA: ICD-10-CM

## 2025-01-13 RX ORDER — SIMVASTATIN 10 MG
10 TABLET ORAL NIGHTLY
Qty: 90 TABLET | Refills: 0 | Status: SHIPPED | OUTPATIENT
Start: 2025-01-13

## 2025-01-13 NOTE — TELEPHONE ENCOUNTER
.Name of Medication(s) Requested:  Requested Prescriptions     Pending Prescriptions Disp Refills    simvastatin (ZOCOR) 10 MG tablet [Pharmacy Med Name: Simvastatin Oral Tablet 10 MG] 90 tablet 0     Sig: TAKE ONE TABLET BY MOUTH NIGHTLY       Medication is on current medication list Yes    Dosage and directions were verified? Yes    Quantity verified: 90 day supply     Pharmacy Verified?  Yes    Last Appointment:  12/30/2024    Future appts:  Future Appointments   Date Time Provider Department Center   3/31/2025  1:00 PM Shan Marcial MD [facility]        (If no appt send self scheduling link. .REFILLAPPT)  Scheduling request sent?     [] Yes  [x] No    Does patient need updated?  [] Yes  [x] No

## 2025-03-26 ENCOUNTER — HOSPITAL ENCOUNTER (OUTPATIENT)
Age: 65
Discharge: HOME | End: 2025-03-26
Payer: COMMERCIAL

## 2025-03-26 DIAGNOSIS — E78.2 MIXED HYPERLIPIDEMIA: ICD-10-CM

## 2025-03-26 DIAGNOSIS — E87.6 HYPOKALEMIA: ICD-10-CM

## 2025-03-26 DIAGNOSIS — I10 HTN (HYPERTENSION), BENIGN: ICD-10-CM

## 2025-03-26 LAB
ALBUMIN SERPL-MCNC: 4.1 G/DL (ref 3.5–5.2)
ALP SERPL-CCNC: 85 U/L (ref 35–104)
ALT SERPL-CCNC: 18 U/L (ref 0–32)
ANION GAP SERPL CALCULATED.3IONS-SCNC: 12 MMOL/L (ref 7–16)
AST SERPL-CCNC: 40 U/L (ref 0–31)
BASOPHILS # BLD: 0.05 K/UL (ref 0–0.2)
BASOPHILS NFR BLD: 1 % (ref 0–2)
BILIRUB SERPL-MCNC: 0.8 MG/DL (ref 0–1.2)
BUN SERPL-MCNC: 15 MG/DL (ref 6–23)
CALCIUM SERPL-MCNC: 9.8 MG/DL (ref 8.6–10.2)
CHLORIDE SERPL-SCNC: 102 MMOL/L (ref 98–107)
CHOLEST SERPL-MCNC: 158 MG/DL
CO2 SERPL-SCNC: 25 MMOL/L (ref 22–29)
CREAT SERPL-MCNC: 0.9 MG/DL (ref 0.5–1)
EOSINOPHIL # BLD: 0.37 K/UL (ref 0.05–0.5)
EOSINOPHILS RELATIVE PERCENT: 6 % (ref 0–6)
ERYTHROCYTE [DISTWIDTH] IN BLOOD BY AUTOMATED COUNT: 13.9 % (ref 12–16)
GFR, ESTIMATED: 74 ML/MIN/1.73M2
GLUCOSE SERPL-MCNC: 103 MG/DL (ref 74–99)
HCT VFR BLD AUTO: 40 % (ref 34–48)
HDLC SERPL-MCNC: 40 MG/DL
HGB BLD-MCNC: 13.6 G/DL (ref 11.5–15.5)
IMM GRANULOCYTES # BLD AUTO: <0.03 K/UL (ref 0–0.58)
IMM GRANULOCYTES NFR BLD: 0 % (ref 0–5)
LDLC SERPL CALC-MCNC: 82 MG/DL
LYMPHOCYTES NFR BLD: 1.38 K/UL (ref 1.5–4)
LYMPHOCYTES RELATIVE PERCENT: 21 % (ref 20–42)
MCH RBC QN AUTO: 27.4 PG (ref 26–35)
MCHC RBC AUTO-ENTMCNC: 34 G/DL (ref 32–34.5)
MCV RBC AUTO: 80.5 FL (ref 80–99.9)
MONOCYTES NFR BLD: 0.46 K/UL (ref 0.1–0.95)
MONOCYTES NFR BLD: 7 % (ref 2–12)
NEUTROPHILS NFR BLD: 65 % (ref 43–80)
NEUTS SEG NFR BLD: 4.2 K/UL (ref 1.8–7.3)
PLATELET # BLD AUTO: 215 K/UL (ref 130–450)
PMV BLD AUTO: 11.2 FL (ref 7–12)
POTASSIUM SERPL-SCNC: 3.6 MMOL/L (ref 3.5–5)
PROT SERPL-MCNC: 7.1 G/DL (ref 6.4–8.3)
RBC # BLD AUTO: 4.97 M/UL (ref 3.5–5.5)
SODIUM SERPL-SCNC: 139 MMOL/L (ref 132–146)
TRIGL SERPL-MCNC: 180 MG/DL
VLDLC SERPL CALC-MCNC: 36 MG/DL
WBC OTHER # BLD: 6.5 K/UL (ref 4.5–11.5)

## 2025-03-26 PROCEDURE — 36415 COLL VENOUS BLD VENIPUNCTURE: CPT

## 2025-03-26 PROCEDURE — 80053 COMPREHEN METABOLIC PANEL: CPT

## 2025-03-26 PROCEDURE — 80061 LIPID PANEL: CPT

## 2025-03-26 PROCEDURE — 85025 COMPLETE CBC W/AUTO DIFF WBC: CPT

## 2025-03-28 SDOH — ECONOMIC STABILITY: FOOD INSECURITY: WITHIN THE PAST 12 MONTHS, YOU WORRIED THAT YOUR FOOD WOULD RUN OUT BEFORE YOU GOT MONEY TO BUY MORE.: NEVER TRUE

## 2025-03-28 SDOH — ECONOMIC STABILITY: FOOD INSECURITY: WITHIN THE PAST 12 MONTHS, THE FOOD YOU BOUGHT JUST DIDN'T LAST AND YOU DIDN'T HAVE MONEY TO GET MORE.: NEVER TRUE

## 2025-03-28 SDOH — ECONOMIC STABILITY: INCOME INSECURITY: IN THE LAST 12 MONTHS, WAS THERE A TIME WHEN YOU WERE NOT ABLE TO PAY THE MORTGAGE OR RENT ON TIME?: NO

## 2025-03-28 SDOH — ECONOMIC STABILITY: TRANSPORTATION INSECURITY
IN THE PAST 12 MONTHS, HAS THE LACK OF TRANSPORTATION KEPT YOU FROM MEDICAL APPOINTMENTS OR FROM GETTING MEDICATIONS?: NO

## 2025-03-28 SDOH — ECONOMIC STABILITY: TRANSPORTATION INSECURITY
IN THE PAST 12 MONTHS, HAS LACK OF TRANSPORTATION KEPT YOU FROM MEETINGS, WORK, OR FROM GETTING THINGS NEEDED FOR DAILY LIVING?: NO

## 2025-03-28 ASSESSMENT — PATIENT HEALTH QUESTIONNAIRE - PHQ9
1. LITTLE INTEREST OR PLEASURE IN DOING THINGS: NOT AT ALL
SUM OF ALL RESPONSES TO PHQ QUESTIONS 1-9: 0
SUM OF ALL RESPONSES TO PHQ QUESTIONS 1-9: 0
2. FEELING DOWN, DEPRESSED OR HOPELESS: NOT AT ALL
SUM OF ALL RESPONSES TO PHQ QUESTIONS 1-9: 0
1. LITTLE INTEREST OR PLEASURE IN DOING THINGS: NOT AT ALL
SUM OF ALL RESPONSES TO PHQ9 QUESTIONS 1 & 2: 0
SUM OF ALL RESPONSES TO PHQ QUESTIONS 1-9: 0
2. FEELING DOWN, DEPRESSED OR HOPELESS: NOT AT ALL

## 2025-03-31 ENCOUNTER — OFFICE VISIT (OUTPATIENT)
Dept: FAMILY MEDICINE CLINIC | Age: 65
End: 2025-03-31
Payer: COMMERCIAL

## 2025-03-31 VITALS
BODY MASS INDEX: 34.56 KG/M2 | HEART RATE: 72 BPM | HEIGHT: 68 IN | OXYGEN SATURATION: 98 % | DIASTOLIC BLOOD PRESSURE: 74 MMHG | RESPIRATION RATE: 18 BRPM | WEIGHT: 228 LBS | SYSTOLIC BLOOD PRESSURE: 116 MMHG

## 2025-03-31 DIAGNOSIS — Z12.31 ENCOUNTER FOR SCREENING MAMMOGRAM FOR MALIGNANT NEOPLASM OF BREAST: Primary | ICD-10-CM

## 2025-03-31 DIAGNOSIS — K21.9 GASTROESOPHAGEAL REFLUX DISEASE WITHOUT ESOPHAGITIS: ICD-10-CM

## 2025-03-31 DIAGNOSIS — E78.2 MIXED HYPERLIPIDEMIA: ICD-10-CM

## 2025-03-31 DIAGNOSIS — I10 HTN (HYPERTENSION), BENIGN: ICD-10-CM

## 2025-03-31 DIAGNOSIS — R73.9 HYPERGLYCEMIA: ICD-10-CM

## 2025-03-31 PROCEDURE — 99214 OFFICE O/P EST MOD 30 MIN: CPT | Performed by: FAMILY MEDICINE

## 2025-03-31 PROCEDURE — 3074F SYST BP LT 130 MM HG: CPT | Performed by: FAMILY MEDICINE

## 2025-03-31 PROCEDURE — 3078F DIAST BP <80 MM HG: CPT | Performed by: FAMILY MEDICINE

## 2025-03-31 RX ORDER — GABAPENTIN 300 MG/1
300 CAPSULE ORAL DAILY
COMMUNITY

## 2025-03-31 ASSESSMENT — ENCOUNTER SYMPTOMS
GASTROINTESTINAL NEGATIVE: 1
EYES NEGATIVE: 1
ALLERGIC/IMMUNOLOGIC NEGATIVE: 1
BACK PAIN: 1
RESPIRATORY NEGATIVE: 1

## 2025-03-31 NOTE — PATIENT INSTRUCTIONS
Continue present treatment  Start taking the Zocor  Low-sodium low-fat low-carb weight reduction diet  Follow-up with orthopedic surgeon  Lab work before the visit  Return to clinic earlier if any problems

## 2025-03-31 NOTE — PROGRESS NOTES
OFFICE PROGRESS NOTE      SUBJECTIVE:        Patient ID:   Dayanna Palacio is a 64 y.o. female who presents for   Chief Complaint   Patient presents with    Hyperlipidemia     Patient stopped her medication a few days ago because it was causing her to have wild dreams.    Discuss Labs           HPI:     Patient is here for the follow-up  Blood pressure 116/74  Lab work reviewed  Fasting sugar 103  Triglyceride 180  Electrolytes abnormal  Patient had stopped taking the Zocor    Prior to Visit Medications    Medication Sig Taking? Authorizing Provider   gabapentin (NEURONTIN) 300 MG capsule Take 1 capsule by mouth daily. Yes Maranda Fish MD   potassium chloride (KLOR-CON) 10 MEQ extended release tablet Take 1 tablet by mouth 3 times daily Yes Shan Marcial MD   triamterene-hydroCHLOROthiazide (MAXZIDE-25) 37.5-25 MG per tablet TAKE ONE TABLET BY MOUTH DAILY Yes Shan Marcial MD   doxycycline hyclate (VIBRA-TABS) 100 MG tablet Take 1 tablet by mouth 2 times daily Yes Maranda Fish MD   oxyCODONE (ROXICODONE) 5 MG immediate release tablet TAKE 1 TO 2 TABLETS BY MOUTH EVERY 6 HOURS AS NEEDED FOR PAIN for up to 7 days Yes Maranda Fish MD   omeprazole (PRILOSEC) 40 MG delayed release capsule Take 1 capsule by mouth daily Yes Shan Marcial MD   ibuprofen (IBU) 800 MG tablet Take 1 tablet by mouth every 8 hours as needed for Pain Yes Scott Duran MD   NONFORMULARY Dr. Funes doxycycline as needed for any dentist appts.  Instructed to take 2 morning of those procedures Yes Maranda Fish MD   Cholecalciferol (VITAMIN D3) 125 MCG (5000 UT) TABS Take by mouth daily Yes Maranda Fish MD   methocarbamol (ROBAXIN) 750 MG tablet Take 1 tablet by mouth 2 times daily as needed (for pain (may cause drowsiness)) Yes Bari Hernandez PA-C   simvastatin (ZOCOR) 10 MG tablet TAKE ONE TABLET BY MOUTH NIGHTLY  Patient not taking: Reported on

## 2025-05-09 RX ORDER — OMEPRAZOLE 40 MG/1
40 CAPSULE, DELAYED RELEASE ORAL DAILY
Qty: 30 CAPSULE | Refills: 0 | OUTPATIENT
Start: 2025-05-09

## 2025-05-14 ENCOUNTER — TELEPHONE (OUTPATIENT)
Dept: FAMILY MEDICINE CLINIC | Age: 65
End: 2025-05-14

## 2025-05-14 NOTE — TELEPHONE ENCOUNTER
Called patient who said she will have mammogram after June 1 ST . Did give her scheduling phone number.

## 2025-05-19 RX ORDER — OMEPRAZOLE 40 MG/1
40 CAPSULE, DELAYED RELEASE ORAL DAILY
Qty: 30 CAPSULE | Refills: 1 | OUTPATIENT
Start: 2025-05-19

## 2025-05-23 RX ORDER — OMEPRAZOLE 40 MG/1
40 CAPSULE, DELAYED RELEASE ORAL DAILY
Qty: 90 CAPSULE | Refills: 1 | Status: SHIPPED | OUTPATIENT
Start: 2025-05-23

## 2025-05-31 DIAGNOSIS — E78.2 MIXED HYPERLIPIDEMIA: ICD-10-CM

## 2025-06-02 RX ORDER — SIMVASTATIN 10 MG
10 TABLET ORAL NIGHTLY
Qty: 30 TABLET | Refills: 0 | Status: SHIPPED | OUTPATIENT
Start: 2025-06-02

## 2025-06-06 ENCOUNTER — HOSPITAL ENCOUNTER (OUTPATIENT)
Dept: MAMMOGRAPHY | Age: 65
Discharge: HOME | End: 2025-06-08
Attending: FAMILY MEDICINE
Payer: COMMERCIAL

## 2025-06-06 VITALS — WEIGHT: 220 LBS | BODY MASS INDEX: 33.34 KG/M2 | HEIGHT: 68 IN

## 2025-06-06 DIAGNOSIS — Z12.31 ENCOUNTER FOR SCREENING MAMMOGRAM FOR MALIGNANT NEOPLASM OF BREAST: ICD-10-CM

## 2025-06-06 PROCEDURE — 77063 BREAST TOMOSYNTHESIS BI: CPT

## 2025-06-11 ENCOUNTER — HOSPITAL ENCOUNTER (OUTPATIENT)
Age: 65
Discharge: HOME | End: 2025-06-11
Payer: COMMERCIAL

## 2025-06-11 DIAGNOSIS — I10 HTN (HYPERTENSION), BENIGN: ICD-10-CM

## 2025-06-11 DIAGNOSIS — R73.9 HYPERGLYCEMIA: ICD-10-CM

## 2025-06-11 DIAGNOSIS — E78.2 MIXED HYPERLIPIDEMIA: ICD-10-CM

## 2025-06-11 DIAGNOSIS — K21.9 GASTROESOPHAGEAL REFLUX DISEASE WITHOUT ESOPHAGITIS: ICD-10-CM

## 2025-06-11 LAB
ALBUMIN SERPL-MCNC: 4.2 G/DL (ref 3.5–5.2)
ALP SERPL-CCNC: 87 U/L (ref 35–104)
ALT SERPL-CCNC: 31 U/L (ref 0–35)
ANION GAP SERPL CALCULATED.3IONS-SCNC: 12 MMOL/L (ref 7–16)
AST SERPL-CCNC: 48 U/L (ref 0–35)
BASOPHILS # BLD: 0.05 K/UL (ref 0–0.2)
BASOPHILS NFR BLD: 1 % (ref 0–2)
BILIRUB SERPL-MCNC: 0.7 MG/DL (ref 0–1.2)
BUN SERPL-MCNC: 17 MG/DL (ref 8–23)
CALCIUM SERPL-MCNC: 9.4 MG/DL (ref 8.8–10.2)
CHLORIDE SERPL-SCNC: 104 MMOL/L (ref 98–107)
CHOLEST SERPL-MCNC: 163 MG/DL
CO2 SERPL-SCNC: 25 MMOL/L (ref 22–29)
CREAT SERPL-MCNC: 0.8 MG/DL (ref 0.5–1)
EOSINOPHIL # BLD: 0.1 K/UL (ref 0.05–0.5)
EOSINOPHILS RELATIVE PERCENT: 2 % (ref 0–6)
ERYTHROCYTE [DISTWIDTH] IN BLOOD BY AUTOMATED COUNT: 13 % (ref 12–16)
GFR, ESTIMATED: 85 ML/MIN/1.73M2
GLUCOSE SERPL-MCNC: 84 MG/DL (ref 74–99)
HCT VFR BLD AUTO: 41.6 % (ref 34–48)
HDLC SERPL-MCNC: 49 MG/DL
HGB BLD-MCNC: 14.1 G/DL (ref 11.5–15.5)
IMM GRANULOCYTES # BLD AUTO: <0.03 K/UL (ref 0–0.58)
IMM GRANULOCYTES NFR BLD: 0 % (ref 0–5)
LDLC SERPL CALC-MCNC: 94 MG/DL
LYMPHOCYTES NFR BLD: 1.22 K/UL (ref 1.5–4)
LYMPHOCYTES RELATIVE PERCENT: 28 % (ref 20–42)
MCH RBC QN AUTO: 28.4 PG (ref 26–35)
MCHC RBC AUTO-ENTMCNC: 33.9 G/DL (ref 32–34.5)
MCV RBC AUTO: 83.9 FL (ref 80–99.9)
MONOCYTES NFR BLD: 0.36 K/UL (ref 0.1–0.95)
MONOCYTES NFR BLD: 8 % (ref 2–12)
NEUTROPHILS NFR BLD: 60 % (ref 43–80)
NEUTS SEG NFR BLD: 2.61 K/UL (ref 1.8–7.3)
PLATELET # BLD AUTO: 179 K/UL (ref 130–450)
PMV BLD AUTO: 11.5 FL (ref 7–12)
POTASSIUM SERPL-SCNC: 3.5 MMOL/L (ref 3.5–5.1)
PROT SERPL-MCNC: 7 G/DL (ref 6.4–8.3)
RBC # BLD AUTO: 4.96 M/UL (ref 3.5–5.5)
SODIUM SERPL-SCNC: 141 MMOL/L (ref 136–145)
TRIGL SERPL-MCNC: 103 MG/DL
VLDLC SERPL CALC-MCNC: 21 MG/DL
WBC OTHER # BLD: 4.4 K/UL (ref 4.5–11.5)

## 2025-06-11 PROCEDURE — 80061 LIPID PANEL: CPT

## 2025-06-11 PROCEDURE — 36415 COLL VENOUS BLD VENIPUNCTURE: CPT

## 2025-06-11 PROCEDURE — 85025 COMPLETE CBC W/AUTO DIFF WBC: CPT

## 2025-06-11 PROCEDURE — 80053 COMPREHEN METABOLIC PANEL: CPT

## 2025-06-13 ENCOUNTER — OFFICE VISIT (OUTPATIENT)
Dept: FAMILY MEDICINE CLINIC | Age: 65
End: 2025-06-13
Payer: MEDICARE

## 2025-06-13 VITALS
RESPIRATION RATE: 16 BRPM | OXYGEN SATURATION: 97 % | SYSTOLIC BLOOD PRESSURE: 120 MMHG | WEIGHT: 227 LBS | HEIGHT: 68 IN | TEMPERATURE: 98.2 F | DIASTOLIC BLOOD PRESSURE: 80 MMHG | BODY MASS INDEX: 34.4 KG/M2 | HEART RATE: 67 BPM

## 2025-06-13 DIAGNOSIS — K21.9 GASTROESOPHAGEAL REFLUX DISEASE WITHOUT ESOPHAGITIS: ICD-10-CM

## 2025-06-13 DIAGNOSIS — I10 HTN (HYPERTENSION), BENIGN: ICD-10-CM

## 2025-06-13 DIAGNOSIS — E78.2 MIXED HYPERLIPIDEMIA: Primary | ICD-10-CM

## 2025-06-13 DIAGNOSIS — R73.9 HYPERGLYCEMIA: ICD-10-CM

## 2025-06-13 PROCEDURE — 99214 OFFICE O/P EST MOD 30 MIN: CPT | Performed by: FAMILY MEDICINE

## 2025-06-13 PROCEDURE — 3079F DIAST BP 80-89 MM HG: CPT | Performed by: FAMILY MEDICINE

## 2025-06-13 PROCEDURE — 3074F SYST BP LT 130 MM HG: CPT | Performed by: FAMILY MEDICINE

## 2025-06-13 RX ORDER — LANOLIN ALCOHOL/MO/W.PET/CERES
100 CREAM (GRAM) TOPICAL DAILY
COMMUNITY

## 2025-06-13 RX ORDER — ACETAMINOPHEN 500 MG
500 TABLET ORAL EVERY 6 HOURS PRN
COMMUNITY

## 2025-06-13 RX ORDER — DIMENHYDRINATE 50 MG
400 TABLET ORAL DAILY
COMMUNITY

## 2025-06-13 ASSESSMENT — ENCOUNTER SYMPTOMS
EYES NEGATIVE: 1
BACK PAIN: 1
RESPIRATORY NEGATIVE: 1
GASTROINTESTINAL NEGATIVE: 1
ALLERGIC/IMMUNOLOGIC NEGATIVE: 1

## 2025-06-13 NOTE — PATIENT INSTRUCTIONS
Continue present treatment  Low-sodium low-fat low-carb diet  Regular exercises  Follow-up with orthopedic surgeon  Lab work before the next visit  Return to clinic earlier if any problems

## 2025-06-13 NOTE — PROGRESS NOTES
OFFICE PROGRESS NOTE      SUBJECTIVE:        Patient ID:   Dayanna Palacio is a 64 y.o. female who presents for   Chief Complaint   Patient presents with    Check-Up    Discuss Labs           HPI:   Patient here for the follow-up  Blood pressure 120/80  Lab work reviewed  Lab work is within normal limit  Blood sugar within normal limits with the diet  GERD symptoms are better with the medication    Prior to Visit Medications    Medication Sig Taking? Authorizing Provider   acetaminophen (TYLENOL) 500 MG tablet Take 1 tablet by mouth every 6 hours as needed for Pain Yes Maranda Fish MD   Coenzyme Q10 (CO Q-10) 100 MG CAPS Take 4 capsules by mouth daily Taking 400 mg softgel daily. Yes Maranda Fish MD   vitamin B-6 (PYRIDOXINE) 50 MG tablet Take 2 tablets by mouth daily Yes Maranda Fish MD   simvastatin (ZOCOR) 10 MG tablet TAKE ONE TABLET BY MOUTH NIGHTLY Yes Shan Marcial MD   omeprazole (PRILOSEC) 40 MG delayed release capsule TAKE ONE CAPSULE BY MOUTH DAILY Yes Shan Marcial MD   gabapentin (NEURONTIN) 300 MG capsule Take 1 capsule by mouth daily. Yes Maranda Fish MD   potassium chloride (KLOR-CON) 10 MEQ extended release tablet Take 1 tablet by mouth 3 times daily Yes Shan Marcial MD   triamterene-hydroCHLOROthiazide (MAXZIDE-25) 37.5-25 MG per tablet TAKE ONE TABLET BY MOUTH DAILY Yes Shan Marcial MD   doxycycline hyclate (VIBRA-TABS) 100 MG tablet Take 1 tablet by mouth 2 times daily Yes Maranda Fish MD   Cholecalciferol (VITAMIN D3) 125 MCG (5000 UT) TABS Take by mouth daily Yes ProviderMaranad MD   methocarbamol (ROBAXIN) 750 MG tablet Take 1 tablet by mouth 2 times daily as needed (for pain (may cause drowsiness)) Yes Bari Hernandez PA-C   oxyCODONE (ROXICODONE) 5 MG immediate release tablet TAKE 1 TO 2 TABLETS BY MOUTH EVERY 6 HOURS AS NEEDED FOR PAIN for up to 7 days  Patient not taking: Reported

## 2025-07-02 DIAGNOSIS — E87.6 HYPOKALEMIA: ICD-10-CM

## 2025-07-02 DIAGNOSIS — I10 HTN (HYPERTENSION), BENIGN: ICD-10-CM

## 2025-07-02 RX ORDER — TRIAMTERENE AND HYDROCHLOROTHIAZIDE 37.5; 25 MG/1; MG/1
1 TABLET ORAL DAILY
Qty: 90 TABLET | Refills: 0 | Status: SHIPPED | OUTPATIENT
Start: 2025-07-02

## 2025-07-02 RX ORDER — POTASSIUM CHLORIDE 750 MG/1
10 TABLET, EXTENDED RELEASE ORAL 3 TIMES DAILY
Qty: 270 TABLET | Refills: 0 | Status: SHIPPED | OUTPATIENT
Start: 2025-07-02

## 2025-07-02 NOTE — TELEPHONE ENCOUNTER
Name of Medication(s) Requested:  Requested Prescriptions     Pending Prescriptions Disp Refills    triamterene-hydroCHLOROthiazide (MAXZIDE-25) 37.5-25 MG per tablet [Pharmacy Med Name: TRIAMTERENE-HCTZ 37.5-25 MG TB] 90 tablet 0     Sig: TAKE 1 TABLET BY MOUTH EVERY DAY    potassium chloride (KLOR-CON) 10 MEQ extended release tablet [Pharmacy Med Name: POTASSIUM CL ER 10 MEQ TAB WAX] 270 tablet 0     Sig: TAKE 1 TABLET BY MOUTH THREE TIMES A DAY       Medication is on current medication list Yes    Dosage and directions were verified? Yes    Quantity verified: 90 day supply     Pharmacy Verified?  Yes    Last Appointment:  6/13/2025    Future appts:  Future Appointments   Date Time Provider Department Center   9/19/2025 11:15 AM Shan Marcial MD [facility] DE[facility]        (If no appt send self scheduling link. .REFILLAPPT)  Scheduling request sent?     [] Yes  [x] No    Does patient need updated?  [] Yes  [x] No

## 2025-07-10 DIAGNOSIS — E78.2 MIXED HYPERLIPIDEMIA: ICD-10-CM

## 2025-07-11 RX ORDER — SIMVASTATIN 10 MG
10 TABLET ORAL NIGHTLY
Qty: 30 TABLET | Refills: 2 | Status: SHIPPED | OUTPATIENT
Start: 2025-07-11

## 2025-07-11 NOTE — TELEPHONE ENCOUNTER
Name of Medication(s) Requested:  Requested Prescriptions     Pending Prescriptions Disp Refills    simvastatin (ZOCOR) 10 MG tablet [Pharmacy Med Name: SIMVASTATIN 10 MG TABLET] 30 tablet 2     Sig: TAKE 1 TABLET BY MOUTH EVERY NIGHT       Medication is on current medication list Yes    Dosage and directions were verified? Yes    Quantity verified: 30 day supply     Pharmacy Verified?  Yes    Last Appointment:  6/13/2025    Future appts:  Future Appointments   Date Time Provider Department Center   9/19/2025 11:15 AM Shan Marcial MD [facility] DE[facility]        (If no appt send self scheduling link. .REFILLAPPT)  Scheduling request sent?     [] Yes  [x] No    Does patient need updated?  [] Yes  [x] No

## (undated) DEVICE — NEEDLE HYPO 25GA L1.5IN BLU POLYPR HUB S STL REG BVL STR

## (undated) DEVICE — TUBERCULIN SAFETY SYRINGE WITH NEEDLE: Brand: MONOJECT

## (undated) DEVICE — Z INACTIVE USE 2863041 SPONGE GZ W4XL4IN 100% COT 16 PLY RADPQ HIGHLY ABSRB

## (undated) DEVICE — COVER,LIGHT HANDLE,FLX,1/PK: Brand: MEDLINE INDUSTRIES, INC.

## (undated) DEVICE — BLADE ES ELASTOMERIC COAT INSUL DURABLE BEND UPTO 90DEG

## (undated) DEVICE — ADHESIVE SKIN CLSR 0.7ML SGL PEEL PCH GEL WTRPRF FOR WOUNDS

## (undated) DEVICE — INTENDED FOR TISSUE SEPARATION, AND OTHER PROCEDURES THAT REQUIRE A SHARP SURGICAL BLADE TO PUNCTURE OR CUT.: Brand: BARD-PARKER ® STAINLESS STEEL BLADES

## (undated) DEVICE — GLOVE ORANGE PI 7 1/2   MSG9075

## (undated) DEVICE — BANDAGE,GAUZE,CONFORMING,3"X75",STRL,LF: Brand: MEDLINE

## (undated) DEVICE — HANDLE CVR PATENTED RETENTION DISC STRL LIGHT SHLD

## (undated) DEVICE — DRAPE,EXTREMITY,89X128,STERILE: Brand: MEDLINE

## (undated) DEVICE — ELECTRODE PT RET AD L9FT HI MOIST COND ADH HYDRGEL CORDED

## (undated) DEVICE — Z INACTIVE NO ACTIVE SUPPLIER APPLICATOR MEDICATED 26 CC TINT HI-LITE ORNG STRL CHLORAPREP

## (undated) DEVICE — THIN OFFSET (9.0 X 0.38 X 31.0MM)

## (undated) DEVICE — GOWN SURG XL SMS FAB NONREINFORCED RAGLAN SLV HK LOOP CLSR

## (undated) DEVICE — Z INACTIVE USE 2855128 SPONGE GZ 16 PLY WVN COT 4INX4IN  HHH

## (undated) DEVICE — STRIP,CLOSURE,WOUND,MEDI-STRIP,1/4X3: Brand: MEDLINE

## (undated) DEVICE — CHLORAPREP 26ML ORANGE

## (undated) DEVICE — PEN: MARKING STD 100/CS: Brand: MEDICAL ACTION INDUSTRIES

## (undated) DEVICE — SYRINGE,EAR/ULCER, 2 OZ, STERILE: Brand: MEDLINE

## (undated) DEVICE — DRESSING PETRO W3XL8IN OIL EMUL N ADH GZ KNIT IMPREG CELOS

## (undated) DEVICE — NEEDLE HYPO 18GA L1.5IN PNK POLYPR HUB S STL THN WALL FILL

## (undated) DEVICE — TUBING, SUCTION, 1/4" X 10', STRAIGHT: Brand: MEDLINE

## (undated) DEVICE — Z DISCONTINUED USE 2132709 NEEDLE HYPO 18GA L1.5IN PNK POLYPR HUB S STL THN WALL FILL

## (undated) DEVICE — NEEDLE HYPO 30GA L0.5IN BGE POLYPR HUB S STL REG BVL STR

## (undated) DEVICE — GAUZE,SPONGE,4"X4",16PLY,XRAY,STRL,LF: Brand: MEDLINE

## (undated) DEVICE — CONTROL SYRINGE LUER-LOCK TIP: Brand: MONOJECT

## (undated) DEVICE — PATIENT RETURN ELECTRODE, SINGLE-USE, CONTACT QUALITY MONITORING, ADULT, WITH 9FT CORD, FOR PATIENTS WEIGING OVER 33LBS. (15KG): Brand: MEGADYNE

## (undated) DEVICE — SYRINGE, LUER LOCK, 10ML: Brand: MEDLINE

## (undated) DEVICE — SOLUTION IRRIG 1000ML 0.9% SOD CHL USP POUR PLAS BTL

## (undated) DEVICE — ZIMMER® STERILE DISPOSABLE TOURNIQUET CUFF WITH PROTECTIVE SLEEVE AND PLC, DUAL PORT, SINGLE BLADDER, 18 IN. (46 CM)

## (undated) DEVICE — DRAPE 64X41IN RADIOLOGY C ARM EQUIP STER

## (undated) DEVICE — CLOTH SKIN PREP 2% CHG

## (undated) DEVICE — TOWEL,OR,DSP,ST,BLUE,STD,6/PK,12PK/CS: Brand: MEDLINE

## (undated) DEVICE — MARKER,SKIN,WI/RULER AND LABELS: Brand: MEDLINE

## (undated) DEVICE — STANDARD SURGICAL GOWN, L: Brand: CONVERTORS

## (undated) DEVICE — MEDI-VAC NON-CONDUCTIVE SUCTION TUBING: Brand: CARDINAL HEALTH

## (undated) DEVICE — BNDG,ELSTC,MATRIX,STRL,4"X5YD,LF,HOOK&LP: Brand: MEDLINE

## (undated) DEVICE — CANNULA IV 18GA L15IN BLNT FILL LUERLOCK HUB MJCT

## (undated) DEVICE — DOUBLE BASIN SET: Brand: MEDLINE INDUSTRIES, INC.

## (undated) DEVICE — BANDAGE TBLR FLAT 4 1.5 IN LEG THGH HD SHLDR WHT TUBEGAUZ

## (undated) DEVICE — NDL CNTR 40CT FM MAG: Brand: MEDLINE INDUSTRIES, INC.